# Patient Record
Sex: FEMALE | Race: BLACK OR AFRICAN AMERICAN | NOT HISPANIC OR LATINO | Employment: OTHER | URBAN - METROPOLITAN AREA
[De-identification: names, ages, dates, MRNs, and addresses within clinical notes are randomized per-mention and may not be internally consistent; named-entity substitution may affect disease eponyms.]

---

## 2017-03-05 ENCOUNTER — HOSPITAL ENCOUNTER (OUTPATIENT)
Facility: OTHER | Age: 82
Discharge: HOME | End: 2017-03-07
Attending: EMERGENCY MEDICINE | Admitting: HOSPITALIST
Payer: MEDICARE

## 2017-03-05 DIAGNOSIS — N18.4 CKD (CHRONIC KIDNEY DISEASE) STAGE 4, GFR 15-29 ML/MIN: ICD-10-CM

## 2017-03-05 DIAGNOSIS — E78.5 HYPERLIPIDEMIA, UNSPECIFIED HYPERLIPIDEMIA TYPE: ICD-10-CM

## 2017-03-05 DIAGNOSIS — I10 ESSENTIAL HYPERTENSION: ICD-10-CM

## 2017-03-05 DIAGNOSIS — R01.1 SYSTOLIC MURMUR: ICD-10-CM

## 2017-03-05 DIAGNOSIS — R55 SYNCOPE, UNSPECIFIED SYNCOPE TYPE: ICD-10-CM

## 2017-03-05 DIAGNOSIS — R01.1 HEART MURMUR: ICD-10-CM

## 2017-03-05 DIAGNOSIS — R55 SYNCOPE: Primary | ICD-10-CM

## 2017-03-05 LAB
ALBUMIN SERPL BCP-MCNC: 4.1 G/DL
ALP SERPL-CCNC: 72 U/L
ALT SERPL W/O P-5'-P-CCNC: 12 U/L
ANION GAP SERPL CALC-SCNC: 10 MMOL/L
AST SERPL-CCNC: 20 U/L
BASOPHILS # BLD AUTO: 0.02 K/UL
BASOPHILS NFR BLD: 0.2 %
BILIRUB SERPL-MCNC: 0.4 MG/DL
BNP SERPL-MCNC: 112 PG/ML
BUN SERPL-MCNC: 20 MG/DL
CALCIUM SERPL-MCNC: 9 MG/DL
CHLORIDE SERPL-SCNC: 109 MMOL/L
CO2 SERPL-SCNC: 23 MMOL/L
CREAT SERPL-MCNC: 1.5 MG/DL
DIFFERENTIAL METHOD: ABNORMAL
EOSINOPHIL # BLD AUTO: 0.1 K/UL
EOSINOPHIL NFR BLD: 0.7 %
ERYTHROCYTE [DISTWIDTH] IN BLOOD BY AUTOMATED COUNT: 14 %
EST. GFR  (AFRICAN AMERICAN): 35 ML/MIN/1.73 M^2
EST. GFR  (NON AFRICAN AMERICAN): 30 ML/MIN/1.73 M^2
GLUCOSE SERPL-MCNC: 111 MG/DL
HCT VFR BLD AUTO: 35.2 %
HGB BLD-MCNC: 11.2 G/DL
LYMPHOCYTES # BLD AUTO: 1 K/UL
LYMPHOCYTES NFR BLD: 9.3 %
MCH RBC QN AUTO: 27.9 PG
MCHC RBC AUTO-ENTMCNC: 31.8 %
MCV RBC AUTO: 88 FL
MONOCYTES # BLD AUTO: 0.8 K/UL
MONOCYTES NFR BLD: 7 %
NEUTROPHILS # BLD AUTO: 8.9 K/UL
NEUTROPHILS NFR BLD: 82.4 %
PLATELET # BLD AUTO: 226 K/UL
PMV BLD AUTO: 10.6 FL
POTASSIUM SERPL-SCNC: 3.8 MMOL/L
PROT SERPL-MCNC: 7.9 G/DL
RBC # BLD AUTO: 4.02 M/UL
SODIUM SERPL-SCNC: 142 MMOL/L
TROPONIN I SERPL DL<=0.01 NG/ML-MCNC: 0.01 NG/ML
TROPONIN I SERPL DL<=0.01 NG/ML-MCNC: 0.01 NG/ML
WBC # BLD AUTO: 10.76 K/UL

## 2017-03-05 PROCEDURE — 99285 EMERGENCY DEPT VISIT HI MDM: CPT | Mod: 25

## 2017-03-05 PROCEDURE — 80053 COMPREHEN METABOLIC PANEL: CPT

## 2017-03-05 PROCEDURE — 83880 ASSAY OF NATRIURETIC PEPTIDE: CPT

## 2017-03-05 PROCEDURE — G0378 HOSPITAL OBSERVATION PER HR: HCPCS

## 2017-03-05 PROCEDURE — 85025 COMPLETE CBC W/AUTO DIFF WBC: CPT

## 2017-03-05 PROCEDURE — 93005 ELECTROCARDIOGRAM TRACING: CPT

## 2017-03-05 PROCEDURE — 96374 THER/PROPH/DIAG INJ IV PUSH: CPT

## 2017-03-05 PROCEDURE — 84484 ASSAY OF TROPONIN QUANT: CPT | Mod: 91

## 2017-03-05 PROCEDURE — 63600175 PHARM REV CODE 636 W HCPCS: Performed by: EMERGENCY MEDICINE

## 2017-03-05 PROCEDURE — 93010 ELECTROCARDIOGRAM REPORT: CPT | Mod: ,,, | Performed by: INTERNAL MEDICINE

## 2017-03-05 PROCEDURE — 84484 ASSAY OF TROPONIN QUANT: CPT

## 2017-03-05 RX ORDER — ACETAMINOPHEN 325 MG/1
650 TABLET ORAL EVERY 8 HOURS PRN
Status: DISCONTINUED | OUTPATIENT
Start: 2017-03-05 | End: 2017-03-07 | Stop reason: HOSPADM

## 2017-03-05 RX ORDER — PANTOPRAZOLE SODIUM 40 MG/1
40 TABLET, DELAYED RELEASE ORAL DAILY
Status: DISCONTINUED | OUTPATIENT
Start: 2017-03-06 | End: 2017-03-07 | Stop reason: HOSPADM

## 2017-03-05 RX ORDER — METOPROLOL SUCCINATE 50 MG/1
50 TABLET, EXTENDED RELEASE ORAL DAILY
Status: DISCONTINUED | OUTPATIENT
Start: 2017-03-06 | End: 2017-03-07 | Stop reason: HOSPADM

## 2017-03-05 RX ORDER — ONDANSETRON 2 MG/ML
4 INJECTION INTRAMUSCULAR; INTRAVENOUS
Status: COMPLETED | OUTPATIENT
Start: 2017-03-05 | End: 2017-03-05

## 2017-03-05 RX ORDER — ONDANSETRON 8 MG/1
8 TABLET, ORALLY DISINTEGRATING ORAL EVERY 8 HOURS PRN
Status: DISCONTINUED | OUTPATIENT
Start: 2017-03-05 | End: 2017-03-07 | Stop reason: HOSPADM

## 2017-03-05 RX ORDER — ASPIRIN 81 MG/1
81 TABLET ORAL DAILY
Status: DISCONTINUED | OUTPATIENT
Start: 2017-03-06 | End: 2017-03-07 | Stop reason: HOSPADM

## 2017-03-05 RX ORDER — ROSUVASTATIN CALCIUM 5 MG/1
5 TABLET, COATED ORAL DAILY
Status: DISCONTINUED | OUTPATIENT
Start: 2017-03-06 | End: 2017-03-07 | Stop reason: HOSPADM

## 2017-03-05 RX ORDER — ZOLPIDEM TARTRATE 5 MG/1
5 TABLET ORAL NIGHTLY PRN
Status: DISCONTINUED | OUTPATIENT
Start: 2017-03-05 | End: 2017-03-07 | Stop reason: HOSPADM

## 2017-03-05 RX ADMIN — ONDANSETRON 4 MG: 2 INJECTION, SOLUTION INTRAMUSCULAR; INTRAVENOUS at 03:03

## 2017-03-05 NOTE — IP AVS SNAPSHOT
[facility] Location ([facility])  [address] [place], [place] [address]  Phone: [phone]           Patient Discharge Instructions     Our goal is to set you up for success. This packet includes information on your condition, medications, and your home care. It will help you to care for yourself so you don't get sicker and need to go back to the hospital.     Please ask your nurse if you have any questions.        There are many details to remember when preparing to leave the hospital. Here is what you will need to do:    1. Take your medicine. If you are prescribed medications, review your Medication List in the following pages. You may have new medications to  at the pharmacy and others that you'll need to stop taking. Review the instructions for how and when to take your medications. Talk with your doctor or nurses if you are unsure of what to do.     2. Go to your follow-up appointments. Specific follow-up information is listed in the following pages. Your may be contacted by a transition nurse or clinical provider about future appointments. Be sure we have all of the phone numbers to reach you, if needed. Please contact your provider's office if you are unable to make an appointment.     3. Watch for warning signs. Your doctor or nurse will give you detailed warning signs to watch for and when to call for assistance. These instructions may also include educational information about your condition. If you experience any of warning signs to your health, call your doctor.               [facility]  Unless otherwise directed by your provider, please contact [facility], our nurse care line that is available for 24/7 assistance.     [phone] (toll-free)    Registered nurses in the [facility] provide clinical advisement, health education, appointment booking, and other advisory services.                    ** Verify the list of medication(s) below is accurate and up to  date. Carry this with you in case of emergency. If your medications have changed, please notify your healthcare provider.             Medication List      CONTINUE taking these medications        Additional Info                      amlodipine-benazepril 10-20mg 10-20 mg per capsule   Commonly known as:  LOTREL   Refills:  0   Dose:  1 capsule    Instructions:  Take 1 capsule by mouth once daily.     Begin Date    AM    Noon    PM    Bedtime       aspirin 81 MG EC tablet   Commonly known as:  ECOTRIN   Refills:  0   Dose:  81 mg    Last time this was given:  81 mg on 3/7/2017  8:02 AM   Instructions:  Take 81 mg by mouth once daily.     Begin Date    AM    Noon    PM    Bedtime       multivit-mineral-iron-lutein Tab   Refills:  0    Instructions:  Take by mouth.     Begin Date    AM    Noon    PM    Bedtime       omeprazole 20 MG capsule   Commonly known as:  PRILOSEC   Refills:  0   Dose:  20 mg    Instructions:  Take 20 mg by mouth once daily.     Begin Date    AM    Noon    PM    Bedtime       rosuvastatin 5 MG tablet   Commonly known as:  CRESTOR   Refills:  0   Dose:  5 mg    Last time this was given:  5 mg on 3/7/2017  8:03 AM   Instructions:  Take 5 mg by mouth once daily.     Begin Date    AM    Noon    PM    Bedtime       TOPROL XL 50 MG 24 hr tablet   Refills:  0   Dose:  50 mg   Generic drug:  metoprolol succinate    Last time this was given:  50 mg on 3/7/2017  8:02 AM   Instructions:  Take 50 mg by mouth once daily.     Begin Date    AM    Noon    PM    Bedtime                  Please bring to all follow up appointments:    1. A copy of your discharge instructions.  2. All medicines you are currently taking in their original bottles.  3. Identification and insurance card.    Please arrive 15 minutes ahead of scheduled appointment time.    Please call 24 hours in advance if you must reschedule your appointment and/or time.        Follow-up Information     Follow up with Ari Manzano MD. Schedule an  "appointment as soon as possible for a visit in 3 days.    Specialty:  Internal Medicine    Contact information:    [address] [facility] [address]  [phone]          Follow up with Minesh Granados Jr, MD. Schedule an appointment as soon as possible for a visit in 3 days.    Specialty:  Cardiology    Contact information:    [address] [facility] [address]  [phone]          Discharge Instructions     Future Orders    Activity as tolerated     Call MD for:  difficulty breathing or increased cough     Call MD for:  increased confusion or weakness     Call MD for:  persistent dizziness, light-headedness, or visual disturbances     Call MD for:  persistent nausea and vomiting or diarrhea     Call MD for:  redness, tenderness, or signs of infection (pain, swelling, redness, odor or green/yellow discharge around incision site)     Call MD for:  severe uncontrolled pain     Call MD for:  temperature >100.4     Diet general     Questions:    Total calories:      Fat restriction, if any:      Protein restriction, if any:      Na restriction, if any:      Fluid restriction:      Additional restrictions:          Primary Diagnosis     Your primary diagnosis was:  Fainting      Admission Information     Date & Time Provider Department [facility]    3/5/2017  2:08 PM Ted Delatorre MD [facility] [phone]      Care Providers     Provider Role Specialty Primary office phone    Ted Delatorre MD Attending Provider Hospitalist [phone]    Marta Barr MD Consulting Physician  Cardiology [phone]      Your Vitals Were     BP Pulse Temp Resp Height Weight    159/70 (BP Location: Left arm, Patient Position: Lying, BP Method: Automatic) 58 97.6 °F (36.4 °C) (Oral) 18 5' 6" (1.676 m) 77.1 kg (170 lb)    Last Period SpO2 BMI          (LMP Unknown) 95% 27.44 kg/m2        Recent Lab Values     No lab values to display.      Allergies as of 3/7/2017     No Known Allergies      Advance Directives  "    An advance directive is a document which, in the event you are no longer able to make decisions for yourself, tells your healthcare team what kind of treatment you do or do not want to receive, or who you would like to make those decisions for you.  If you do not currently have an advance directive, [facility] encourages you to create one.  For more information call:  [phone]), [phone] ([phone]),  or log on to [email or web address].        Language Assistance Services     ATTENTION: Language assistance services are available, free of charge. Please call [phone].      ATENCIÓN: Si garimala krystyna, tiene a grayson disposición servicios gratuitos de asistencia lingüística. Llame al [phone].     CHÚ Ý: N?u b?n nói Ti?ng Vi?t, có các d?ch v? h? tr? ngôn ng? mi?n phí dành cho b?n. G?i s? [phone].        Chronic Kindey Disease Education             [facility] Sign-Up     Activating your [facility] account is as easy as 1-2-3!     1) Visit [email or web address], select Sign Up Now, enter this activation code and your date of birth, then select Next.  [email or web address]  Expires: 4/21/2017  2:52 PM      2) Create a username and password to use when you visit [facility] in the future and select a security question in case you lose your password and select Next.    3) Enter your e-mail address and click Sign Up!    Additional Information  If you have questions, please e-mail [email or web address] or call [phone] to talk to our [facility] staff. Remember, [facility] is NOT to be used for urgent needs. For medical emergencies, dial 911.          [facility] complies with applicable Federal civil rights laws and does not discriminate on the basis of race, color, national origin, age, disability, or sex.

## 2017-03-05 NOTE — ED NOTES
Pt ambulated to restroom w/ minimal assistance needed. Respirations are even and non labored w/ no distress noted. Family remains at bedside. Pt assisted back into bed. Pt remains on cardiac monitor, continuous pulse oximetry and automatic blood pressure cuff cycling w/ alarms set. Bed placed in low locked position, side rails up x 2, call light is within reach of patient or family, alarms set and turned on for monitor and pulse ox, will continue to monitor.

## 2017-03-05 NOTE — ED NOTES
Patient identifiers verified and correct for Ignacio Oakes.  Pt denies chest pain, dizziness, blurred vision or SOB at this time.   LOC: The patient is awake, alert and aware of environment with an appropriate affect, the patient is oriented x 3 and speaking appropriately.  APPEARANCE: Patient resting comfortably and in no acute distress, patient is clean and well groomed, patient's clothing is properly fastened.  SKIN: The skin is warm and dry, color consistent with ethnicity, patient has normal skin turgor and moist mucus membranes, skin intact, no breakdown or bruising noted.  MUSCULOSKELETAL: Patient moving all extremities spontaneously, no obvious swelling or deformities noted.  RESPIRATORY: Airway is open and patent, respirations are spontaneous, patient has a normal effort and rate, no accessory muscle use noted, bilateral breath sounds clear.  CARDIAC: Patient has a normal rate and regular rhythm, no periphreal edema noted, capillary refill < 3 seconds.  ABDOMEN: Soft and non tender to palpation, no distention noted.  NEUROLOGIC: PERRL, 2 mm bilaterally, eyes open spontaneously, behavior appropriate to situation, follows commands, facial expression symmetrical, bilateral hand grasp equal and even, purposeful motor response noted, normal sensation in all extremities when touched with a finger.    Fall risk band applied to pt.

## 2017-03-05 NOTE — ED PROVIDER NOTES
"Encounter Date: 3/5/2017    SCRIBE #1 NOTE: I, Brandy Bishop, am scribing for, and in the presence of, Dr. Norman.       History     Chief Complaint   Patient presents with    Vomiting     Pt CO N/V after eating today. Pt in NAD at this time.     Review of patient's allergies indicates:  No Known Allergies  HPI Comments:   Time seen by provider: 2:47 PM    The patient is a 89 y.o. female with HTN who presents to the ED via EMS with an sudden onset of near syncope.  Near-syncope is described as lightheadedness with associated nausea and vomiting a few minutes after she ate ribs, mashed potatoes, and sweet potatoes for lunch. The daughter reports that she was "not unconscious but was not responsive" like usual for "a minute" right before she started vomiting.  There was no fall or trauma associated.  Currently, the patient complains of right hip thigh pain which has been bothering her intermittently over the last week. Her daughter notes that her "stomach medication was not changed but the color of the pill changed" with the first dose of the new formulation taken this morning. She reports eating grits this morning and feeling in her regular state of good health. The patient denies smoking tobacco, drinking alcohol, history of DM, cancer, stroke, or cardiac complications, recent fall, injury or trauma, SOB, chest pain, palpitations, abdominal pain, back pain, urinary symptoms, or any other symptoms at this time. No SHx noted. No known drug allergies noted.     The history is provided by the patient.     Past Medical History:   Diagnosis Date    High cholesterol     Hypertension     Syncope      History reviewed. No pertinent surgical history.  History reviewed. No pertinent family history.  Social History   Substance Use Topics    Smoking status: Never Smoker    Smokeless tobacco: Never Used    Alcohol use No     Review of Systems   Constitutional: Negative for chills and fever.   HENT: Negative for congestion, " rhinorrhea, sneezing and sore throat.    Eyes: Negative for discharge and visual disturbance.   Respiratory: Negative for shortness of breath.    Cardiovascular: Negative for chest pain and palpitations.   Gastrointestinal: Positive for nausea and vomiting. Negative for abdominal pain and diarrhea.   Genitourinary: Negative for dysuria and hematuria.   Musculoskeletal: Positive for arthralgias (RLE). Negative for back pain.   Skin: Negative for rash and wound.   Neurological: Positive for syncope and light-headedness. Negative for seizures.   Psychiatric/Behavioral: The patient is not nervous/anxious.      Physical Exam   Initial Vitals   BP Pulse Resp Temp SpO2   03/05/17 1414 03/05/17 1414 03/05/17 1414 03/05/17 1414 03/05/17 1414   143/66 92 24 98.3 °F (36.8 °C) 99 %     Physical Exam    Nursing note and vitals reviewed.  Constitutional: She appears well-developed and well-nourished. She is not diaphoretic. No distress.   HENT:   Head: Normocephalic and atraumatic.   Mouth/Throat: Oropharynx is clear and moist.   Eyes: Conjunctivae and EOM are normal. Pupils are equal, round, and reactive to light.   Neck: Neck supple.   Cardiovascular: Normal rate, regular rhythm and intact distal pulses. Exam reveals no gallop and no friction rub.    Murmur (harsh deep crescendo-decrescendo murmur) heard.  Pulmonary/Chest: She has no wheezes. She has no rhonchi. She has rales (faint velcro rales at the bases).   Abdominal: Soft. She exhibits no distension. There is no tenderness. There is no rebound and no guarding.   Musculoskeletal: Normal range of motion.   Neurological: She is alert and oriented to person, place, and time. She has normal strength. No cranial nerve deficit or sensory deficit.   Skin: No rash noted. No erythema.   Psychiatric: She has a normal mood and affect. Her behavior is normal. Thought content normal.       ED Course   Procedures  Labs Reviewed   CBC W/ AUTO DIFFERENTIAL - Abnormal; Notable for the  following:        Result Value    Hemoglobin 11.2 (*)     Hematocrit 35.2 (*)     MCHC 31.8 (*)     Gran # 8.9 (*)     Gran% 82.4 (*)     Lymph% 9.3 (*)     All other components within normal limits   COMPREHENSIVE METABOLIC PANEL - Abnormal; Notable for the following:     Glucose 111 (*)     Creatinine 1.5 (*)     eGFR if  35 (*)     eGFR if non  30 (*)     All other components within normal limits   B-TYPE NATRIURETIC PEPTIDE - Abnormal; Notable for the following:      (*)     All other components within normal limits   TROPONIN I   TROPONIN I   URINALYSIS   TROPONIN I     Imaging Results         X-Ray Chest AP Portable (Final result) Result time:  03/05/17 15:38:17    Final result by Keith Pierre III, MD (03/05/17 15:38:17)    Impression:     No acute process seen.      Electronically signed by: KEITH PIERRE  Date:     03/05/17  Time:    15:38     Narrative:    One view: The heart size is normal.  There is aortic plaque.  There are prominent brachycephalic veins.  There is no change from 5/9/2015.    Lungs are clear.            EKG Readings: (Independently Interpreted)   Initial Reading: No STEMI.   Normal sinus rhythm at a rate of 83 bpm with voltage criteria for LVH.      X-Rays:   Independently Interpreted Readings:   Chest X-Ray: No infiltrates, effusion, pneumothorax, or acute process.     Medical Decision Making:   History:   Old Medical Records: I decided to obtain old medical records.  Clinical Tests:   Lab Tests: Reviewed and Ordered  Radiological Study: Reviewed and Ordered  Medical Tests: Ordered and Reviewed  ED Management:  Emergent evaluation of 89-year-old female with complaint of a syncopal episode and vomiting following lunch today.  There was no associated trauma.  Patient also complains of hip pain, which is been ongoing for a week.  On exam there is a loud heart murmur consistent with aortic stenosis.  No prior echoes on file since she usually  receives her medical care at [facility].  I'm concerned for possible dysrhythmia.  Initial labs including screening troponin are negative, other than mild elevation in BNP and renal insufficiency.  Chest x-ray showed no acute process.  She is admitted in stable condition for cardiac monitoring and further care.            Scribe Attestation:   Scribe #1: I performed the above scribed service and the documentation accurately describes the services I performed. I attest to the accuracy of the note.    Attending Attestation:           Physician Attestation for Scribe:  Physician Attestation Statement for Scribe #1: I, Dr. Norman, reviewed documentation, as scribed by Brandy Bishop in my presence, and it is both accurate and complete.                 ED Course     Clinical Impression:     1. Syncope    2. Heart murmur          Disposition:   Disposition: Admitted       Traci Norman MD  03/05/17 8589

## 2017-03-05 NOTE — ED NOTES
Patient moved to ED room 3 via EMS, patient assisted onto stretcher and changed into a gown. Patient placed on cardiac monitor, continuous pulse oximetry and automatic blood pressure cuff. Bed placed in low locked position, side rails up x 2, call light is within reach of patient or family, orientation to room and explanation of wait provided to family and patient, alarms set and turned on for monitor and pulse ox, awaiting MD evaluation and orders, will continue to monitor.

## 2017-03-05 NOTE — ED TRIAGE NOTES
"Pt presents to ER w/ reports of + nausea w/ vomiting x 2 episodes earlier today. Pt states," I was eating some ribs for lunch and just started feeling dizzy. I threw up two times but I feel better now". Pt denies chest pain, SOB, diarrhea, fever or chills.   "

## 2017-03-06 LAB
ALBUMIN SERPL BCP-MCNC: 3.4 G/DL
ALP SERPL-CCNC: 62 U/L
ALT SERPL W/O P-5'-P-CCNC: 11 U/L
ANION GAP SERPL CALC-SCNC: 7 MMOL/L
AORTIC VALVE REGURGITATION: ABNORMAL
AORTIC VALVE STENOSIS: ABNORMAL
AST SERPL-CCNC: 23 U/L
BACTERIA #/AREA URNS HPF: NORMAL /HPF
BASOPHILS # BLD AUTO: 0.02 K/UL
BASOPHILS NFR BLD: 0.3 %
BILIRUB SERPL-MCNC: 0.7 MG/DL
BILIRUB UR QL STRIP: NEGATIVE
BUN SERPL-MCNC: 19 MG/DL
CALCIUM SERPL-MCNC: 8.9 MG/DL
CHLORIDE SERPL-SCNC: 110 MMOL/L
CLARITY UR: CLEAR
CO2 SERPL-SCNC: 26 MMOL/L
COLOR UR: YELLOW
CREAT SERPL-MCNC: 1.6 MG/DL
DIASTOLIC DYSFUNCTION: YES
DIFFERENTIAL METHOD: ABNORMAL
EOSINOPHIL # BLD AUTO: 0.2 K/UL
EOSINOPHIL NFR BLD: 2.1 %
ERYTHROCYTE [DISTWIDTH] IN BLOOD BY AUTOMATED COUNT: 14 %
EST. GFR  (AFRICAN AMERICAN): 32 ML/MIN/1.73 M^2
EST. GFR  (NON AFRICAN AMERICAN): 28 ML/MIN/1.73 M^2
ESTIMATED PA SYSTOLIC PRESSURE: 12
GLOBAL PERICARDIAL EFFUSION: ABNORMAL
GLUCOSE SERPL-MCNC: 83 MG/DL
GLUCOSE UR QL STRIP: NEGATIVE
HCT VFR BLD AUTO: 32 %
HGB BLD-MCNC: 10 G/DL
HGB UR QL STRIP: ABNORMAL
KETONES UR QL STRIP: NEGATIVE
LEUKOCYTE ESTERASE UR QL STRIP: ABNORMAL
LYMPHOCYTES # BLD AUTO: 1.4 K/UL
LYMPHOCYTES NFR BLD: 19.2 %
MCH RBC QN AUTO: 27.2 PG
MCHC RBC AUTO-ENTMCNC: 31.3 %
MCV RBC AUTO: 87 FL
MICROSCOPIC COMMENT: NORMAL
MONOCYTES # BLD AUTO: 0.7 K/UL
MONOCYTES NFR BLD: 9.8 %
NEUTROPHILS # BLD AUTO: 4.9 K/UL
NEUTROPHILS NFR BLD: 68.2 %
NITRITE UR QL STRIP: NEGATIVE
PH UR STRIP: 6 [PH] (ref 5–8)
PLATELET # BLD AUTO: 226 K/UL
PMV BLD AUTO: 10.3 FL
POTASSIUM SERPL-SCNC: 4.5 MMOL/L
PROT SERPL-MCNC: 6.8 G/DL
PROT UR QL STRIP: NEGATIVE
RBC # BLD AUTO: 3.67 M/UL
RBC #/AREA URNS HPF: 4 /HPF (ref 0–4)
RETIRED EF AND QEF - SEE NOTES: 60 (ref 55–65)
SODIUM SERPL-SCNC: 143 MMOL/L
SP GR UR STRIP: 1.01 (ref 1–1.03)
SQUAMOUS #/AREA URNS HPF: 3 /HPF
TRICUSPID VALVE REGURGITATION: ABNORMAL
URN SPEC COLLECT METH UR: ABNORMAL
UROBILINOGEN UR STRIP-ACNC: NEGATIVE EU/DL
WBC # BLD AUTO: 7.24 K/UL
WBC #/AREA URNS HPF: 2 /HPF (ref 0–5)

## 2017-03-06 PROCEDURE — 25000003 PHARM REV CODE 250

## 2017-03-06 PROCEDURE — 85025 COMPLETE CBC W/AUTO DIFF WBC: CPT

## 2017-03-06 PROCEDURE — 81000 URINALYSIS NONAUTO W/SCOPE: CPT

## 2017-03-06 PROCEDURE — 36415 COLL VENOUS BLD VENIPUNCTURE: CPT

## 2017-03-06 PROCEDURE — 80053 COMPREHEN METABOLIC PANEL: CPT

## 2017-03-06 PROCEDURE — G0378 HOSPITAL OBSERVATION PER HR: HCPCS

## 2017-03-06 PROCEDURE — 93306 TTE W/DOPPLER COMPLETE: CPT

## 2017-03-06 PROCEDURE — 99220 PR INITIAL OBSERVATION CARE,LEVL III: CPT | Mod: ,,, | Performed by: PHYSICIAN ASSISTANT

## 2017-03-06 RX ADMIN — METOPROLOL SUCCINATE 50 MG: 50 TABLET, EXTENDED RELEASE ORAL at 08:03

## 2017-03-06 RX ADMIN — ROSUVASTATIN CALCIUM 5 MG: 5 TABLET, FILM COATED ORAL at 08:03

## 2017-03-06 RX ADMIN — PANTOPRAZOLE SODIUM 40 MG: 40 TABLET, DELAYED RELEASE ORAL at 08:03

## 2017-03-06 RX ADMIN — ASPIRIN 81 MG: 81 TABLET, COATED ORAL at 08:03

## 2017-03-06 NOTE — ASSESSMENT & PLAN NOTE
- possibly related to newly diagnosed heart murmur  - Normal neuro exam  - ECHO today   - will assess orthostatics  - Consulting cardiology

## 2017-03-06 NOTE — SUBJECTIVE & OBJECTIVE
Past Medical History:   Diagnosis Date    High cholesterol     Hypertension     Syncope        History reviewed. No pertinent surgical history.    Review of patient's allergies indicates:  No Known Allergies    No current facility-administered medications on file prior to encounter.      Current Outpatient Prescriptions on File Prior to Encounter   Medication Sig    amlodipine-benazepril 10-20mg (LOTREL) 10-20 mg per capsule Take 1 capsule by mouth once daily.    aspirin (ECOTRIN) 81 MG EC tablet Take 81 mg by mouth once daily.    metoprolol succinate (TOPROL XL) 50 MG 24 hr tablet Take 50 mg by mouth once daily.    multivit-mineral-iron-lutein Tab Take by mouth.    omeprazole (PRILOSEC) 20 MG capsule Take 20 mg by mouth once daily.    rosuvastatin (CRESTOR) 5 MG tablet Take 5 mg by mouth once daily.     Family History     None        Social History Main Topics    Smoking status: Never Smoker    Smokeless tobacco: Never Used    Alcohol use No    Drug use: No    Sexual activity: Not on file     Review of Systems   Constitutional: Negative for activity change, appetite change, chills, diaphoresis and fever.   Eyes: Negative for visual disturbance.   Respiratory: Negative for cough, shortness of breath and wheezing.    Cardiovascular: Negative for chest pain, palpitations and leg swelling.   Gastrointestinal: Positive for nausea and vomiting. Negative for abdominal pain, constipation and diarrhea.   Genitourinary: Negative for dysuria, flank pain, frequency, hematuria and urgency.   Musculoskeletal: Negative for gait problem, neck pain and neck stiffness.   Skin: Negative for color change and pallor.   Neurological: Positive for dizziness, syncope and light-headedness. Negative for tremors, seizures, facial asymmetry, weakness and headaches.     Objective:     Vital Signs (Most Recent):  Temp: 98.5 °F (36.9 °C) (03/06/17 1100)  Pulse: 79 (03/06/17 1400)  Resp: 18 (03/06/17 1100)  BP: 125/62 (03/06/17  1100)  SpO2: 97 % (03/06/17 1100) Vital Signs (24h Range):  Temp:  [97.9 °F (36.6 °C)-99.1 °F (37.3 °C)] 98.5 °F (36.9 °C)  Pulse:  [] 79  Resp:  [12-45] 18  SpO2:  [84 %-98 %] 97 %  BP: (125-180)/(60-71) 125/62     Weight: 77.1 kg (170 lb)  Body mass index is 27.44 kg/(m^2).    Physical Exam   Constitutional: She is oriented to person, place, and time. She appears well-developed and well-nourished. No distress.   HENT:   Head: Normocephalic and atraumatic.   Right Ear: External ear normal.   Left Ear: External ear normal.   Eyes: Conjunctivae and EOM are normal. Pupils are equal, round, and reactive to light. No scleral icterus.   Neck: Normal range of motion. Neck supple. No tracheal deviation present.   Cardiovascular: Normal rate, regular rhythm, normal heart sounds and intact distal pulses.  Exam reveals no gallop and no friction rub.    No murmur heard.  Pulmonary/Chest: Effort normal and breath sounds normal. No stridor. No respiratory distress. She has no wheezes. She has no rales. She exhibits no tenderness.   Bilateral lung fields CTA.     Abdominal: Soft. Bowel sounds are normal. She exhibits no distension and no mass. There is no tenderness. There is no guarding.   Musculoskeletal: Normal range of motion. She exhibits no deformity.   Neurological: She is alert and oriented to person, place, and time. No cranial nerve deficit. She exhibits normal muscle tone.   The patient was alert, relaxed and cooperative with coherent thought process. AAOx4. CN II-XII were intact. The patient had good muscle bulk and tone with 5/5 strength throughout. Good finger-to-nose task ability. Sensation was intact to light touch.     Skin: Skin is warm and dry. No rash noted. She is not diaphoretic. No erythema. No pallor.   Psychiatric: She has a normal mood and affect. Her behavior is normal. Judgment and thought content normal.   Nursing note and vitals reviewed.       Significant Labs:   BMP:   Recent Labs  Lab  03/06/17  0504   GLU 83      K 4.5      CO2 26   BUN 19   CREATININE 1.6*   CALCIUM 8.9     CBC:   Recent Labs  Lab 03/05/17  1540 03/06/17  0504   WBC 10.76 7.24   HGB 11.2* 10.0*   HCT 35.2* 32.0*    226     CMP:   Recent Labs  Lab 03/05/17  1540 03/06/17  0504    143   K 3.8 4.5    110   CO2 23 26   * 83   BUN 20 19   CREATININE 1.5* 1.6*   CALCIUM 9.0 8.9   PROT 7.9 6.8   ALBUMIN 4.1 3.4*   BILITOT 0.4 0.7   ALKPHOS 72 62   AST 20 23   ALT 12 11   ANIONGAP 10 7*   EGFRNONAA 30* 28*     Troponin:   Recent Labs  Lab 03/05/17  1540 03/05/17  1701   TROPONINI 0.007 0.012     All pertinent labs within the past 24 hours have been reviewed.    Significant Imaging: I have reviewed all pertinent imaging results/findings within the past 24 hours.     Imaging Results         X-Ray Chest AP Portable (Final result) Result time:  03/05/17 15:38:17    Final result by Keith Pierre III, MD (03/05/17 15:38:17)    Impression:     No acute process seen.      Electronically signed by: KEITH PIERRE  Date:     03/05/17  Time:    15:38     Narrative:    One view: The heart size is normal.  There is aortic plaque.  There are prominent brachycephalic veins.  There is no change from 5/9/2015.    Lungs are clear.

## 2017-03-06 NOTE — CLINICAL REVIEW
Ambulated to bathroom with standby assistance. Missed pan in toilet for urine specimen. Will attempt next void.

## 2017-03-06 NOTE — ASSESSMENT & PLAN NOTE
- Appears well controlled   - Continue home meds:    Toprol XL 50 mg QD    Benazapril 20 mg QD     Amlodipine 10 mg QD

## 2017-03-06 NOTE — PLAN OF CARE
03/06/17 1022   CRAMER Message   Medicare Outpatient and Observation Notification regarding financial responsibility Explained to patient/caregiver;Signed/date by patient/caregiver   Date CRAMER was signed 03/06/17   Time CRAMER was signed [id number]

## 2017-03-06 NOTE — PLAN OF CARE
Problem: Patient Care Overview  Goal: Plan of Care Review  Outcome: Ongoing (interventions implemented as appropriate)  Slept well. Family member at bedside. Vs stable.No c/o syncopy

## 2017-03-06 NOTE — H&P
"[facility] Medicine  History & Physical    Patient Name: Ignacio Oakes  MRN: [id number]  Admission Date: 3/5/2017  Attending Physician: Santosh Moore MD   Primary Care Provider: Ari Manzano MD         Patient information was obtained from patient, relative(s), past medical records and ER records.     Subjective:     Principal Problem:Syncope    Chief Complaint:   Chief Complaint   Patient presents with    Vomiting     Pt CO N/V after eating today. Pt in NAD at this time.        HPI: Ms. Ignacio Oakes is a 89 y.o. Female, with PMH of HTN and HLP, who presents s/p near syncopal episode yesterday. Her daughter, present at the time of the episode states she had just eaten a plate of ribs, prior to the episode. The patient states she began to feel dizzy as if she were spinning, when she walked to her recliner and sat down. Shortly after sitting, her daughter endorses she looked as if she fell asleep and "passed out." She then rubber her mother's chest to awaken her twice, each time her mother awoke and was confused as to why her daughter was rubbing her chest. She endorses associated symptoms of a nausea, light headedness, and a single episode of non-bloody emesis shortly after awakening. She denies fever, chills, sweats, chest pain, SOB, abdominal pain, diarrhea, headache, vision changes, weakness, difficulty walking. This is similar to a previous episode for which she was admitted in the past. She endorses a recent color change to her omeprazole tablet, without any further medication changes or additions.     Past Medical History:   Diagnosis Date    High cholesterol     Hypertension     Syncope        History reviewed. No pertinent surgical history.    Review of patient's allergies indicates:  No Known Allergies    No current facility-administered medications on file prior to encounter.      Current Outpatient Prescriptions on File Prior to Encounter   Medication Sig    " amlodipine-benazepril 10-20mg (LOTREL) 10-20 mg per capsule Take 1 capsule by mouth once daily.    aspirin (ECOTRIN) 81 MG EC tablet Take 81 mg by mouth once daily.    metoprolol succinate (TOPROL XL) 50 MG 24 hr tablet Take 50 mg by mouth once daily.    multivit-mineral-iron-lutein Tab Take by mouth.    omeprazole (PRILOSEC) 20 MG capsule Take 20 mg by mouth once daily.    rosuvastatin (CRESTOR) 5 MG tablet Take 5 mg by mouth once daily.     Family History     None        Social History Main Topics    Smoking status: Never Smoker    Smokeless tobacco: Never Used    Alcohol use No    Drug use: No    Sexual activity: Not on file     Review of Systems   Constitutional: Negative for activity change, appetite change, chills, diaphoresis and fever.   Eyes: Negative for visual disturbance.   Respiratory: Negative for cough, shortness of breath and wheezing.    Cardiovascular: Negative for chest pain, palpitations and leg swelling.   Gastrointestinal: Positive for nausea and vomiting. Negative for abdominal pain, constipation and diarrhea.   Genitourinary: Negative for dysuria, flank pain, frequency, hematuria and urgency.   Musculoskeletal: Negative for gait problem, neck pain and neck stiffness.   Skin: Negative for color change and pallor.   Neurological: Positive for dizziness, syncope and light-headedness. Negative for tremors, seizures, facial asymmetry, weakness and headaches.     Objective:     Vital Signs (Most Recent):  Temp: 98.5 °F (36.9 °C) (03/06/17 1100)  Pulse: 79 (03/06/17 1400)  Resp: 18 (03/06/17 1100)  BP: 125/62 (03/06/17 1100)  SpO2: 97 % (03/06/17 1100) Vital Signs (24h Range):  Temp:  [97.9 °F (36.6 °C)-99.1 °F (37.3 °C)] 98.5 °F (36.9 °C)  Pulse:  [] 79  Resp:  [12-45] 18  SpO2:  [84 %-98 %] 97 %  BP: (125-180)/(60-71) 125/62     Weight: 77.1 kg (170 lb)  Body mass index is 27.44 kg/(m^2).    Physical Exam   Constitutional: She is oriented to person, place, and time. She appears  well-developed and well-nourished. No distress.   HENT:   Head: Normocephalic and atraumatic.   Right Ear: External ear normal.   Left Ear: External ear normal.   Eyes: Conjunctivae and EOM are normal. Pupils are equal, round, and reactive to light. No scleral icterus.   Neck: Normal range of motion. Neck supple. No tracheal deviation present.   Cardiovascular: Normal rate, regular rhythm, normal heart sounds and intact distal pulses.  Exam reveals no gallop and no friction rub.    No murmur heard.  Pulmonary/Chest: Effort normal and breath sounds normal. No stridor. No respiratory distress. She has no wheezes. She has no rales. She exhibits no tenderness.   Bilateral lung fields CTA.     Abdominal: Soft. Bowel sounds are normal. She exhibits no distension and no mass. There is no tenderness. There is no guarding.   Musculoskeletal: Normal range of motion. She exhibits no deformity.   Neurological: She is alert and oriented to person, place, and time. No cranial nerve deficit. She exhibits normal muscle tone.   The patient was alert, relaxed and cooperative with coherent thought process. AAOx4. CN II-XII were intact. The patient had good muscle bulk and tone with 5/5 strength throughout. Good finger-to-nose task ability. Sensation was intact to light touch.     Skin: Skin is warm and dry. No rash noted. She is not diaphoretic. No erythema. No pallor.   Psychiatric: She has a normal mood and affect. Her behavior is normal. Judgment and thought content normal.   Nursing note and vitals reviewed.       Significant Labs:   BMP:   Recent Labs  Lab 03/06/17  0504   GLU 83      K 4.5      CO2 26   BUN 19   CREATININE 1.6*   CALCIUM 8.9     CBC:   Recent Labs  Lab 03/05/17  1540 03/06/17  0504   WBC 10.76 7.24   HGB 11.2* 10.0*   HCT 35.2* 32.0*    226     CMP:   Recent Labs  Lab 03/05/17  1540 03/06/17  0504    143   K 3.8 4.5    110   CO2 23 26   * 83   BUN 20 19   CREATININE 1.5*  1.6*   CALCIUM 9.0 8.9   PROT 7.9 6.8   ALBUMIN 4.1 3.4*   BILITOT 0.4 0.7   ALKPHOS 72 62   AST 20 23   ALT 12 11   ANIONGAP 10 7*   EGFRNONAA 30* 28*     Troponin:   Recent Labs  Lab 03/05/17  1540 03/05/17  1701   TROPONINI 0.007 0.012     All pertinent labs within the past 24 hours have been reviewed.    Significant Imaging: I have reviewed all pertinent imaging results/findings within the past 24 hours.     Imaging Results         X-Ray Chest AP Portable (Final result) Result time:  03/05/17 15:38:17    Final result by Keith Pierre III, MD (03/05/17 15:38:17)    Impression:     No acute process seen.      Electronically signed by: KEITH PIERRE  Date:     03/05/17  Time:    15:38     Narrative:    One view: The heart size is normal.  There is aortic plaque.  There are prominent brachycephalic veins.  There is no change from 5/9/2015.    Lungs are clear.               Assessment/Plan:     * Syncope  - possibly related to newly diagnosed heart murmur  - Normal neuro exam  - ECHO today   - will assess orthostatics  - Consulting cardiology       Hypertension  - Appears well controlled   - Continue home meds:    Toprol XL 50 mg QD    Benazapril 20 mg QD     Amlodipine 10 mg QD       Hyperlipidemia  - Continue home meds: Crestor 5 mg QD   - Lipid panel for AM       CKD (chronic kidney disease) stage 4, GFR 15-29 ml/min  - Avoid nephrotoxic medications  - renally dose all medications       Systolic murmur  - Consulting Cardiology   - ECHO today       VTE Risk Mitigation         Ordered     Medium Risk of VTE  Once      03/05/17 1604     Place sequential compression device  Until discontinued      03/05/17 1604        Juanita Parker PA-C  Department of Hospital Medicine   [facility]

## 2017-03-06 NOTE — PLAN OF CARE
"SW met with pt at bedside to complete discharge assessment, thad Rae [phone], present.  Pt disoriented to date and place.  Pt lives with daughter, Marylu.  Pt reported takes meds as prescribe most of the time, "sometimes I just forget".  No needs identified at this time, will reassess later.     03/06/17 1015   Discharge Assessment   Assessment Type Discharge Planning Assessment   Confirmed/corrected address and phone number on facesheet? Yes   Assessment information obtained from? Patient;Caregiver  (Kyra, thad)   Communicated expected length of stay with patient/caregiver no   Prior to hospitilization cognitive status: Alert/Oriented   Prior to hospitalization functional status: Independent   Current cognitive status: Not Oriented to Place;Not Oriented to Time   Current Functional Status: Independent   Arrived From home or self-care   Lives With child(tory), adult   Able to Return to Prior Arrangements yes   Is patient able to care for self after discharge? Yes   How many people do you have in your home that can help with your care after discharge? 1   Who are your caregiver(s) and their phone number(s)? Marylu, thad, [phone]   Patient's perception of discharge disposition home or selfcare   Readmission Within The Last 30 Days no previous admission in last 30 days   Patient currently being followed by outpatient case management? No   Patient currently receives home health services? No   Does the patient currently use HME? No   Patient currently receives private duty nursing? No   Patient currently receives any other outside agency services? No   Do you have any problems affording any of your prescribed medications? No   Is the patient taking medications as prescribed? yes   Do you have any financial concerns preventing you from receiving the healthcare you need? No   Does the patient have transportation to healthcare appointments? Yes   Transportation Available family or friend will provide "   On Dialysis? No   Does the patient receive services at the Coumadin Clinic? No   Are there any open cases? No   Discharge Plan A Home   Patient/Family In Agreement With Plan yes

## 2017-03-07 VITALS
SYSTOLIC BLOOD PRESSURE: 130 MMHG | BODY MASS INDEX: 27.32 KG/M2 | TEMPERATURE: 99 F | DIASTOLIC BLOOD PRESSURE: 61 MMHG | HEIGHT: 66 IN | RESPIRATION RATE: 18 BRPM | HEART RATE: 69 BPM | WEIGHT: 170 LBS | OXYGEN SATURATION: 99 %

## 2017-03-07 PROBLEM — I35.0 AORTIC STENOSIS: Status: ACTIVE | Noted: 2017-03-07

## 2017-03-07 LAB
ALBUMIN SERPL BCP-MCNC: 3.4 G/DL
ALP SERPL-CCNC: 61 U/L
ALT SERPL W/O P-5'-P-CCNC: 10 U/L
ANION GAP SERPL CALC-SCNC: 7 MMOL/L
AST SERPL-CCNC: 19 U/L
BASOPHILS # BLD AUTO: 0.01 K/UL
BASOPHILS NFR BLD: 0.1 %
BILIRUB SERPL-MCNC: 0.6 MG/DL
BUN SERPL-MCNC: 22 MG/DL
CALCIUM SERPL-MCNC: 8.9 MG/DL
CHLORIDE SERPL-SCNC: 108 MMOL/L
CHOLEST/HDLC SERPL: 3 {RATIO}
CO2 SERPL-SCNC: 26 MMOL/L
CREAT SERPL-MCNC: 1.7 MG/DL
DIFFERENTIAL METHOD: ABNORMAL
EOSINOPHIL # BLD AUTO: 0.2 K/UL
EOSINOPHIL NFR BLD: 3.1 %
ERYTHROCYTE [DISTWIDTH] IN BLOOD BY AUTOMATED COUNT: 14 %
EST. GFR  (AFRICAN AMERICAN): 30 ML/MIN/1.73 M^2
EST. GFR  (NON AFRICAN AMERICAN): 26 ML/MIN/1.73 M^2
GLUCOSE SERPL-MCNC: 85 MG/DL
HCT VFR BLD AUTO: 33 %
HDL/CHOLESTEROL RATIO: 33 %
HDLC SERPL-MCNC: 100 MG/DL
HDLC SERPL-MCNC: 33 MG/DL
HGB BLD-MCNC: 10.4 G/DL
LDLC SERPL CALC-MCNC: 57 MG/DL
LYMPHOCYTES # BLD AUTO: 1.6 K/UL
LYMPHOCYTES NFR BLD: 22.1 %
MCH RBC QN AUTO: 27.4 PG
MCHC RBC AUTO-ENTMCNC: 31.5 %
MCV RBC AUTO: 87 FL
MONOCYTES # BLD AUTO: 0.5 K/UL
MONOCYTES NFR BLD: 7.7 %
NEUTROPHILS # BLD AUTO: 4.7 K/UL
NEUTROPHILS NFR BLD: 66.9 %
NONHDLC SERPL-MCNC: 67 MG/DL
PLATELET # BLD AUTO: 236 K/UL
PMV BLD AUTO: 10.3 FL
POTASSIUM SERPL-SCNC: 4.1 MMOL/L
PROT SERPL-MCNC: 6.9 G/DL
RBC # BLD AUTO: 3.79 M/UL
SODIUM SERPL-SCNC: 141 MMOL/L
TRIGL SERPL-MCNC: 50 MG/DL
WBC # BLD AUTO: 7.01 K/UL

## 2017-03-07 PROCEDURE — 80061 LIPID PANEL: CPT

## 2017-03-07 PROCEDURE — G8978 MOBILITY CURRENT STATUS: HCPCS | Mod: CJ

## 2017-03-07 PROCEDURE — 25000003 PHARM REV CODE 250

## 2017-03-07 PROCEDURE — G8980 MOBILITY D/C STATUS: HCPCS | Mod: CJ

## 2017-03-07 PROCEDURE — 85025 COMPLETE CBC W/AUTO DIFF WBC: CPT

## 2017-03-07 PROCEDURE — 36415 COLL VENOUS BLD VENIPUNCTURE: CPT

## 2017-03-07 PROCEDURE — G8979 MOBILITY GOAL STATUS: HCPCS | Mod: CJ

## 2017-03-07 PROCEDURE — 80053 COMPREHEN METABOLIC PANEL: CPT

## 2017-03-07 PROCEDURE — 97161 PT EVAL LOW COMPLEX 20 MIN: CPT

## 2017-03-07 PROCEDURE — 99225 PR SUBSEQUENT OBSERVATION CARE,LEVEL II: CPT | Mod: ,,, | Performed by: PHYSICIAN ASSISTANT

## 2017-03-07 PROCEDURE — G0378 HOSPITAL OBSERVATION PER HR: HCPCS

## 2017-03-07 RX ADMIN — ASPIRIN 81 MG: 81 TABLET, COATED ORAL at 08:03

## 2017-03-07 RX ADMIN — METOPROLOL SUCCINATE 50 MG: 50 TABLET, EXTENDED RELEASE ORAL at 08:03

## 2017-03-07 RX ADMIN — ROSUVASTATIN CALCIUM 5 MG: 5 TABLET, FILM COATED ORAL at 08:03

## 2017-03-07 RX ADMIN — PANTOPRAZOLE SODIUM 40 MG: 40 TABLET, DELAYED RELEASE ORAL at 08:03

## 2017-03-07 NOTE — ASSESSMENT & PLAN NOTE
- possibly related to newly diagnosed heart murmur  - Normal neuro exam  - ECHO showed aortic stenosis with EF of 60%  - Not orthostatic  - Consulting suggests neurocardiogenic cause exacerbated by AS

## 2017-03-07 NOTE — PLAN OF CARE
Problem: Patient Care Overview  Goal: Plan of Care Review  Outcome: Ongoing (interventions implemented as appropriate)  Pt eager & in agreement w/ DC. VU of DC instructions and the need to attend follow-up appointment--paperwork passed & explained. IV removed w/ cath tip intact, WNL. Voiding, ambulating, & tolerating PO well. To be DCd home w/ family--will be escorted downstairs via  transport team once dressed, ready & ride arrives. Free from falls, injury, or skin breakdown this hospital admission. Pt discharged in no distress.

## 2017-03-07 NOTE — ASSESSMENT & PLAN NOTE
- Continue home meds: Crestor 5 mg QD   - Lipid panel shows:   Results for OLI MILLER (MRN [id number]) as of 3/7/2017 13:35   Ref. Range 3/7/2017 05:28   Cholesterol Latest Ref Range: 120 - 199 mg/dL 100 (L)   HDL Latest Ref Range: 40 - 75 mg/dL 33 (L)   LDL Cholesterol Latest Ref Range: 63.0 - 159.0 mg/dL 57.0 (L)   Total Cholesterol/HDL Ratio Latest Ref Range: 2.0 - 5.0  3.0   Triglycerides Latest Ref Range: 30 - 150 mg/dL 50     -She will be discharged with instructions to continue her Crestor as prescribed.

## 2017-03-07 NOTE — PT/OT/SLP PROGRESS
"Physical Therapy  Evaluation and Discharge Summary    Ignacio Oakes   MRN: [id number]   Admitting Diagnosis: Syncope    PT Received On: 17  PT Start Time: 1139     PT Stop Time: 1156    PT Total Time (min): 17 min       Billable Minutes:  Evaluation 17      Past Medical History:   Diagnosis Date    High cholesterol     Hypertension     Syncope       History reviewed. No pertinent surgical history.    Referring physician: Momo  Date referred to PT: 3/6/17    General Precautions: Standard,  (fall risk; ambulate with assist)  Orthopedic Precautions: N/A   Braces: N/A       Do you have any cultural, spiritual, Gnosticist conflicts, given your current situation?: none specified    Patient History:  Living Environment Comment: Per patient and daughter: Pt lives with 2 daughters in 1 story house with 4 steps to enter with bilateral hand rails. She has access to both a tub/shower and a walk-in shower. She performs mobility and ADLs independently, and prefers not to use her rollator. She and her daughter share responsibilities for cooking and cleaning. Her daughters assist with transportation since the patient does not drive.   Equipment Currently Used at Home: none  DME owned (not currently used): rollator      Previous Level of Function:  Ambulation Skills: independent  Transfer Skills: independent  ADL Skills: independent  Work/Leisure Activity: needs assist    Subjective:  Communicated with nurse prior to session.  Pt denied symptoms of lightheadedness and dizziness throughout session. Pt and daughter eager for discharge home and denied further PT needs and DME needs. Pt stated re: her diagnosis, "they say it's old age."    Pain Ratin/10     Pain Rating Post-Intervention: 0/10    Objective:    Pt found supine in bed with HOB elevated.     Cognitive Exam:  Oriented to: Partial to person, partial to place, partial to time    Follows Commands/attention: Follows two-step commands  Communication: " clear/fluent  Safety awareness/insight to disability: intact    Physical Exam:  Postural examination/scapula alignment: Rounded shoulder and Kyphosis    Skin integrity: Visible skin intact  Edema: None noted     Sensation:   Denied paresthesias      Lower Extremity Range of Motion:  Right Lower Extremity: WFL  Left Lower Extremity: WFL    Lower Extremity Strength:  Right Lower Extremity: WFL  Left Lower Extremity: WFL     No coordination or tone impairments identified.    Functional Mobility:  Bed Mobility:   Supine to Sit: Independent  Sit to Supine: Independent    Transfers:  Sit <> Stand Assistance: Supervision  Sit <> Stand Assistive Device: No Assistive Device    Gait:   Gait Distance: x 120 ft on level tile, wide MARAH, medium to high guard  Assistance 1: Contact Guard Assistance (progressing to SBA)  Gait Assistive Device: No device  Gait Pattern: reciprocal  Gait Deviation(s): decreased step length      Balance:   Static Sit: GOOD: Takes MODERATE challenges from all directions  Dynamic Sit: GOOD: Maintains balance through MODERATE excursions of active trunk movement  Static Stand: FAIR+: Takes MINIMAL challenges from all directions  Dynamic stand: FAIR: Needs CONTACT GUARD during gait       AM-PAC 6 CLICK MOBILITY  How much help from another person does this patient currently need?   1 = Unable, Total/Dependent Assistance  2 = A lot, Maximum/Moderate Assistance  3 = A little, Minimum/Contact Guard/Supervision  4 = None, Modified [place]/Independent    Turning over in bed (including adjusting bedclothes, sheets and blankets)?: 4  Sitting down on and standing up from a chair with arms (e.g., wheelchair, bedside commode, etc.): 3  Moving from lying on back to sitting on the side of the bed?: 4  Moving to and from a bed to a chair (including a wheelchair)?: 3  Need to walk in hospital room?: 3  Climbing 3-5 steps with a railing?: 3  Total Score: 20    AM-PAC Raw Score CMS G-Code Modifier Level of Impairment  Assistance   6 % Total / Unable   7 - 9 CM 80 - 100% Maximal Assist   10 - 14 CL 60 - 80% Moderate Assist   15 - 19 CK 40 - 60% Moderate Assist   20 - 22 CJ 20 - 40% Minimal Assist   23 CI 1-20% SBA / CGA   24 CH 0% Independent/ Mod I     Patient left supine with all lines intact, call button in reach, nurse notified and daughter present.    Assessment:  Ignacio Oakes is a 89 y.o. female with a medical diagnosis of Syncope Pt denied symptoms of lightheadedness and dizziness and was slightly hypertensive at beginning session (SBP 150s). PT evaluation completed. Per patient and daughter, the patient is at her functional baseline. She has good activity tolerance including ambulation in halls > 100 ft with CGA progressing to SBA. She has a rollator at home that she does not use. Family is available to assist her 24/7. No further acute PT indicated. Please reconsult if patient has a change in status.     Rehab identified problem list/impairments: Rehab identified problem list/impairments: impaired balance    Rehab potential is fair.    Activity tolerance: Good    Discharge recommendations: Discharge Facility/Level Of Care Needs: home (with continued family 24/7 supervision)     Barriers to discharge:      Equipment recommendations: Equipment Needed After Discharge: none     GOALS:   Physical Therapy Goals     Not on file      Multidisciplinary Problems (Resolved)        Problem: Physical Therapy Goal    Goal Priority Disciplines Outcome Goal Variances Interventions   Physical Therapy Goal   (Resolved)     PT/OT, PT Outcome(s) achieved               PLAN:    Patient to be seen  (No further acute PT indicated, pt is at her functional baseline)  to address the above listed problems via    Plan of Care expires: 04/06/17  Plan of Care reviewed with: patient, daughter         Gerir Salvadordarleen, PT  03/07/2017

## 2017-03-07 NOTE — PROGRESS NOTES
[facility]  Cardiology  Progress Note    Patient Name: Ignacio Oakes  MRN: [id number]  Admission Date: 3/5/2017  Hospital Length of Stay: 0 days  Code Status: Full Code   Attending Physician: Ted Delatorre MD   Primary Care Physician: Ari Manzano MD  Expected Discharge Date:   Principal Problem:Syncope    Subjective:     Brief HPI:    89 yo female with hypertension and moderate to severe hypertension. Lived with her two daughters. Following lunch on 3/5/2017 she got an upset stomach. She had nausea and vomited. Then became weak and had a chnage in mental status. Unclear if she fully lost consciousness.    Hospital Course:     No further weak spells.    Interval History:     No CP, SOB, palpitations or weak spells.    Review of Systems   Constitution: Negative for fever.   Eyes: Negative for blurred vision and double vision.   Cardiovascular: Positive for near-syncope. Negative for chest pain, dyspnea on exertion, irregular heartbeat, leg swelling, orthopnea, palpitations, paroxysmal nocturnal dyspnea and syncope.   Respiratory: Negative for cough, hemoptysis and shortness of breath.    Endocrine: Negative for cold intolerance and heat intolerance.   Gastrointestinal: Negative for hematemesis, hematochezia, hemorrhoids, jaundice, melena, nausea and vomiting.   Genitourinary: Negative for dysuria.     Objective:     Vital Signs (Most Recent):  Temp: 98.3 °F (36.8 °C) (03/07/17 0758)  Pulse: 66 (03/07/17 0800)  Resp: 18 (03/07/17 0758)  BP: (!) 172/72 (03/07/17 0758)  SpO2: 100 % (03/07/17 0758) Vital Signs (24h Range):  Temp:  [98.3 °F (36.8 °C)-98.9 °F (37.2 °C)] 98.3 °F (36.8 °C)  Pulse:  [60-79] 66  Resp:  [18] 18  SpO2:  [95 %-100 %] 100 %  BP: (116-172)/(61-73) 172/72     Weight: 77.1 kg (170 lb)  Body mass index is 27.44 kg/(m^2).    SpO2: 100 %  O2 Device (Oxygen Therapy): room air      Intake/Output Summary (Last 24 hours) at 03/07/17 1023  Last data filed at 03/07/17 0600   Gross per  24 hour   Intake             1300 ml   Output                0 ml   Net             1300 ml       Lines/Drains/Airways     Peripheral Intravenous Line                 Peripheral IV - Single Lumen Left Antecubital -- days         Peripheral IV - Single Lumen 03/02/16 1102 Left Hand 369 days         Peripheral IV - Single Lumen 03/05/17 1407 Left Antecubital 1 day                Physical Exam   Constitutional: She appears well-developed and well-nourished. She does not appear ill. No distress.   HENT:   Head: Normocephalic and atraumatic.   Nose: Nose normal.   Eyes: No foreign body present in the right eye. No foreign body present in the left eye. Right conjunctiva is not injected. Left conjunctiva is not injected. Right pupil is reactive. Left pupil is reactive. Pupils are equal.   Neck: No JVD present. Carotid bruit is not present.   Cardiovascular: Normal rate, regular rhythm, S1 normal and S2 normal.    Murmur heard.   Harsh midsystolic murmur is present  at the upper right sternal border  Pulses:       Dorsalis pedis pulses are 1+ on the right side, and 1+ on the left side.        Posterior tibial pulses are 1+ on the right side, and 1+ on the left side.   Pulmonary/Chest: Effort normal and breath sounds normal.   Abdominal: Soft. Normal appearance. There is no tenderness.   Musculoskeletal:        Right ankle: She exhibits no swelling.        Left ankle: She exhibits no swelling.   Skin: Skin is warm and dry. No rash noted.   Psychiatric: She has a normal mood and affect.       Current Medications:     aspirin  81 mg Oral Daily    metoprolol succinate  50 mg Oral Daily    pantoprazole  40 mg Oral Daily    rosuvastatin  5 mg Oral Daily     Current Laboratory Results:    Recent Results (from the past 24 hour(s))   2D echo with color flow doppler    Collection Time: 03/06/17  3:32 PM   Result Value Ref Range    EF 60 55 - 65    Diastolic Dysfunction Yes (A)     Aortic Valve Regurgitation TRIVIAL     Aortic  Valve Stenosis MODERATE TO SEVERE (A)     Est. PA Systolic Pressure 12     Pericardial Effusion NONE     Tricuspid Valve Regurgitation MILD    CBC auto differential    Collection Time: 03/07/17  5:28 AM   Result Value Ref Range    WBC 7.01 3.90 - 12.70 K/uL    RBC 3.79 (L) 4.00 - 5.40 M/uL    Hemoglobin 10.4 (L) 12.0 - 16.0 g/dL    Hematocrit 33.0 (L) 37.0 - 48.5 %    MCV 87 82 - 98 fL    MCH 27.4 27.0 - 31.0 pg    MCHC 31.5 (L) 32.0 - 36.0 %    RDW 14.0 11.5 - 14.5 %    Platelets 236 150 - 350 K/uL    MPV 10.3 9.2 - 12.9 fL    Gran # 4.7 1.8 - 7.7 K/uL    Lymph # 1.6 1.0 - 4.8 K/uL    Mono # 0.5 0.3 - 1.0 K/uL    Eos # 0.2 0.0 - 0.5 K/uL    Baso # 0.01 0.00 - 0.20 K/uL    Gran% 66.9 38.0 - 73.0 %    Lymph% 22.1 18.0 - 48.0 %    Mono% 7.7 4.0 - 15.0 %    Eosinophil% 3.1 0.0 - 8.0 %    Basophil% 0.1 0.0 - 1.9 %    Differential Method Automated    Comprehensive metabolic panel    Collection Time: 03/07/17  5:28 AM   Result Value Ref Range    Sodium 141 136 - 145 mmol/L    Potassium 4.1 3.5 - 5.1 mmol/L    Chloride 108 95 - 110 mmol/L    CO2 26 23 - 29 mmol/L    Glucose 85 70 - 110 mg/dL    BUN, Bld 22 10 - 30 mg/dL    Creatinine 1.7 (H) 0.5 - 1.4 mg/dL    Calcium 8.9 8.7 - 10.5 mg/dL    Total Protein 6.9 6.0 - 8.4 g/dL    Albumin 3.4 (L) 3.5 - 5.2 g/dL    Total Bilirubin 0.6 0.1 - 1.0 mg/dL    Alkaline Phosphatase 61 55 - 135 U/L    AST 19 10 - 40 U/L    ALT 10 10 - 44 U/L    Anion Gap 7 (L) 8 - 16 mmol/L    eGFR if African American 30 (A) >60 mL/min/1.73 m^2    eGFR if non African American 26 (A) >60 mL/min/1.73 m^2   Lipid panel    Collection Time: 03/07/17  5:28 AM   Result Value Ref Range    Cholesterol 100 (L) 120 - 199 mg/dL    Triglycerides 50 30 - 150 mg/dL    HDL 33 (L) 40 - 75 mg/dL    LDL Cholesterol 57.0 (L) 63.0 - 159.0 mg/dL    HDL/Chol Ratio 33.0 20.0 - 50.0 %    Total Cholesterol/HDL Ratio 3.0 2.0 - 5.0    Non-HDL Cholesterol 67 mg/dL     Current Imaging Results:    Imaging Results         X-Ray Chest  AP Portable (Final result) Result time:  03/05/17 15:38:17    Final result by Keith Pierre III, MD (03/05/17 15:38:17)    Impression:     No acute process seen.      Electronically signed by: KEITH PIERRE  Date:     03/05/17  Time:    15:38     Narrative:    One view: The heart size is normal.  There is aortic plaque.  There are prominent brachycephalic veins.  There is no change from 5/9/2015.    Lungs are clear.              Echo: 3/06/2017:    TEST DESCRIPTION   Technical Quality: This is a technically adequate study.     Aorta: The aortic root is normal in size, measuring 2.3 cm at sinotubular junction and 2.7 cm at Sinuses of Valsalva. The proximal ascending aorta is normal in size, measuring 2.3 cm across.     Left Atrium: The left atrial volume index is normal, measuring 25.46 cc/m2.     Left Ventricle: The left ventricle is normal in size, with an end-diastolic diameter of 3.4 cm, and an end-systolic diameter of 2.0 cm. Wall thickness is mildly increased, with the septum and the posterior wall each measuring 1.2 cm across. Relative wall   thickness was increased at 0.71, and the LV mass index was 79.4 g/m2 consistent with concentric remodeling. Global left ventricular systolic function appears normal. Visually estimated ejection fraction is 60-65%. The LV Doppler derived stroke volume   equals 60.0 ccs.   The E/e'(lat) is 14, consistent with significant diastolic dysfunction.     Right Atrium: The right atrium is normal in size, measuring 3.8 cm in length and 2.1 cm in width in the apical view.     Right Ventricle: The right ventricle is normal in size measuring 2.4 cm at the base in the apical right ventricle-focused view. Global right ventricular systolic function appears normal. The estimated PA systolic pressure is 12 mmHg.     Aortic Valve:  The aortic valve is markedly sclerotic with markedly restricted leaflet mobility. The aortic valve is tri-leaflet in structure. The peak velocity obtained  across the aortic valve is 3.01 m/s, which translates to a peak gradient of 36.0   mmHg. The mean gradient is 20.0 mmHg. Using a left ventricular outflow tract diameter of 1.8 cm, a left ventricular outflow tract velocity time integral of 23 cm, and a peak instantaneous transvalvular velocity time integral of 63 cm, the calculated   aortic valve area is 0.94 cm2, consistent with moderate to severe aortic stenosis. Additionally, there is trivial aortic regurgitation.     Mitral Valve:  The mitral valve is mildly sclerotic. The pressure half time is 83 msec. The calculated mitral valve area is 2.65 cm2.     Tricuspid Valve:  The tricuspid valve is normal in structure. There is mild tricuspid regurgitation.     Pulmonary Valve:  The pulmonic valve is normal in structure.     Pericardium: There is no evidence of pericardial effusion.      IVC: IVC is normal in size and collapses > 50% with a sniff, suggesting normal right atrial pressure of 3 mmHg.     Intracavitary: There is no evidence of intracavitary mass or thrombus. There is no evidence of vegetation.     CONCLUSIONS     1 - Concentric remodeling.     2 - Normal left ventricular systolic function (EF 60-65%).     3 - Left ventricular diastolic dysfunction.     4 - Moderate to severe aortic stenosis, WILTON = 0.94 cm2, peak velocity = 3.01 m/s, mean gradient = 20.0 mmHg.     5 - Trivial aortic regurgitation.       This document has been electronically    SIGNED BY: Marta Barr MD On: 03/06/2017 21:24    Assessment and Plan:     Brief HPI:    Active Diagnoses:    Diagnosis Date Noted POA    PRINCIPAL PROBLEM:  Syncope [R55] 03/05/2017 Yes    Systolic murmur [R01.1] 05/10/2015 Yes    Hypertension [I10] 05/09/2015 Yes    Hyperlipidemia [E78.5] 05/09/2015 Yes    CKD (chronic kidney disease) stage 4, GFR 15-29 ml/min [N18.4] 05/09/2015 Yes      Problems Resolved During this Admission:    Diagnosis Date Noted Date Resolved POA     Assessment and Plan:    1.  "Syncope              Suspect neurocardiogenic event.   Maybe worsened by AS.     2. Aortic Stenosis              3/6/2017: Echo: Moderate to severe AS - 3.0 m/s - 0.9 cm2.              Appears "asymptomatic"              Medical therapy.        VTE Risk Mitigation         Ordered     Medium Risk of VTE  Once      03/05/17 1604     Place sequential compression device  Until discontinued      03/05/17 1604          Anticipated Disposition: Home or Self Care    Discharge Needs: None unusual.    Marta Barr MD  Cardiology  [facility]  "

## 2017-03-07 NOTE — PLAN OF CARE
Problem: Patient Care Overview  Goal: Plan of Care Review  Outcome: Ongoing (interventions implemented as appropriate)  No significant events over night. Remains free from fall or injury. VSS. No c/o dizziness or lightheadedness. Ambulates with standby assistance to the bathroom. Plan of care reviewed with patient and family. All questions answered. No further requests at this time.     Jaja Sarabia RN

## 2017-03-07 NOTE — CONSULTS
[facility]  Cardiology  Consult Note    Patient Name: Ignacio Oakes  MRN: [id number]  Admission Date: 3/5/2017  Hospital Length of Stay: 0 days  Code Status: Full Code   Attending Provider: Santosh Moore MD   Consulting Provider: Marta Barr MD  Primary Care Physician: Ari Manzano MD  Principal Problem:Syncope    Patient information was obtained from patient, relative(s), past medical records and ER records.     Consults  Subjective:     Chief Complaint:  Syncope.     HPI:   89 yo female with hypertension and moderate to severe hypertension. Lived with her two daughters. Following lunch on 3/5/2017 she got an upset stomach. She had nausea and vomited. Then became weak and had a chnage in mental status. Unclear if she fully lost consciousness.    Past Medical History:   Diagnosis Date    High cholesterol     Hypertension     Syncope        History reviewed. No pertinent surgical history.    Review of patient's allergies indicates:  No Known Allergies    No current facility-administered medications on file prior to encounter.      Current Outpatient Prescriptions on File Prior to Encounter   Medication Sig    amlodipine-benazepril 10-20mg (LOTREL) 10-20 mg per capsule Take 1 capsule by mouth once daily.    aspirin (ECOTRIN) 81 MG EC tablet Take 81 mg by mouth once daily.    metoprolol succinate (TOPROL XL) 50 MG 24 hr tablet Take 50 mg by mouth once daily.    multivit-mineral-iron-lutein Tab Take by mouth.    omeprazole (PRILOSEC) 20 MG capsule Take 20 mg by mouth once daily.    rosuvastatin (CRESTOR) 5 MG tablet Take 5 mg by mouth once daily.     Family History     None        Social History Main Topics    Smoking status: Never Smoker    Smokeless tobacco: Never Used    Alcohol use No    Drug use: No    Sexual activity: Not on file     Review of Systems   Cardiovascular: Positive for near-syncope and syncope. Negative for chest pain, claudication, dyspnea on exertion,  irregular heartbeat, leg swelling, orthopnea, palpitations and paroxysmal nocturnal dyspnea.   Respiratory: Negative for cough, hemoptysis, shortness of breath, sleep disturbances due to breathing and wheezing.    Endocrine: Negative for cold intolerance.   Hematologic/Lymphatic: Negative for bleeding problem.   Gastrointestinal: Positive for heartburn, nausea and vomiting. Negative for hematemesis, hematochezia, hemorrhoids, jaundice and melena.   Genitourinary: Negative for hematuria.   Psychiatric/Behavioral: Negative for altered mental status, depression and memory loss. The patient is not nervous/anxious.    Allergic/Immunologic: Negative for persistent infections.     Objective:     Vital Signs (Most Recent):  Temp: 98.8 °F (37.1 °C) (03/06/17 1530)  Pulse: 66 (03/06/17 1800)  Resp: 18 (03/06/17 1530)  BP: 116/61 (03/06/17 1535)  SpO2: 95 % (03/06/17 1530) Vital Signs (24h Range):  Temp:  [97.9 °F (36.6 °C)-98.8 °F (37.1 °C)] 98.8 °F (37.1 °C)  Pulse:  [] 66  Resp:  [18] 18  SpO2:  [95 %-97 %] 95 %  BP: (116-137)/(60-66) 116/61     Weight: 77.1 kg (170 lb)  Body mass index is 27.44 kg/(m^2).    SpO2: 95 %  O2 Device (Oxygen Therapy): room air      Intake/Output Summary (Last 24 hours) at 03/06/17 1955  Last data filed at 03/06/17 1800   Gross per 24 hour   Intake             1620 ml   Output                0 ml   Net             1620 ml       Lines/Drains/Airways     Peripheral Intravenous Line                 Peripheral IV - Single Lumen Left Antecubital -- days         Peripheral IV - Single Lumen 03/02/16 1102 Left Hand 369 days         Peripheral IV - Single Lumen 03/05/17 1407 Left Antecubital 1 day                Physical Exam   Constitutional: She is oriented to person, place, and time. She appears well-developed and well-nourished. She does not appear ill. No distress.   HENT:   Head: Normocephalic and atraumatic.   Nose: Nose normal.   Eyes: Right eye exhibits no discharge. Left eye exhibits no  discharge. Right conjunctiva is not injected. Left conjunctiva is not injected. Right pupil is round. Left pupil is round. Pupils are equal.   Neck: Neck supple. No JVD present. Carotid bruit is present. No thyromegaly present.   Cardiovascular: Normal rate, regular rhythm, S1 normal and S2 normal.   No extrasystoles are present. PMI is not displaced.  Exam reveals gallop and S4.    Murmur heard.   Harsh crescendo-decrescendo midsystolic murmur is present with a grade of 3/6  at the upper right sternal border radiating to the neck  Pulses:       Radial pulses are 2+ on the right side, and 2+ on the left side.        Femoral pulses are 2+ on the right side, and 2+ on the left side.       Dorsalis pedis pulses are 1+ on the right side, and 1+ on the left side.        Posterior tibial pulses are 1+ on the right side, and 1+ on the left side.   Pulmonary/Chest: Effort normal and breath sounds normal.   Abdominal: Soft. Normal appearance. There is no hepatosplenomegaly. There is no tenderness.   Musculoskeletal:        Right ankle: She exhibits no swelling, no ecchymosis and no deformity.        Left ankle: She exhibits no swelling, no ecchymosis and no deformity.   Lymphadenopathy:        Head (right side): No submandibular adenopathy present.        Head (left side): No submandibular adenopathy present.     She has no cervical adenopathy.   Neurological: She is alert and oriented to person, place, and time. She is not disoriented. No cranial nerve deficit.   Skin: Skin is warm, dry and intact. No rash noted. She is not diaphoretic. No cyanosis. No pallor. Nails show no clubbing.   Psychiatric: She has a normal mood and affect. Her speech is normal and behavior is normal. Judgment and thought content normal. Cognition and memory are normal.       Current Medications:     aspirin  81 mg Oral Daily    metoprolol succinate  50 mg Oral Daily    pantoprazole  40 mg Oral Daily    rosuvastatin  5 mg Oral Daily     Current  Laboratory Results:    Recent Results (from the past 24 hour(s))   Urinalysis - Clean Catch    Collection Time: 03/06/17  4:55 AM   Result Value Ref Range    Specimen UA Urine, Clean Catch     Color, UA Yellow Yellow, Straw, Herlinda    Appearance, UA Clear Clear    pH, UA 6.0 5.0 - 8.0    Specific Gravity, UA 1.010 1.005 - 1.030    Protein, UA Negative Negative    Glucose, UA Negative Negative    Ketones, UA Negative Negative    Bilirubin (UA) Negative Negative    Occult Blood UA Trace (A) Negative    Nitrite, UA Negative Negative    Urobilinogen, UA Negative <2.0 EU/dL    Leukocytes, UA Trace (A) Negative   Urinalysis Microscopic    Collection Time: 03/06/17  4:55 AM   Result Value Ref Range    RBC, UA 4 0 - 4 /hpf    WBC, UA 2 0 - 5 /hpf    Bacteria, UA Occasional None-Occ /hpf    Squam Epithel, UA 3 /hpf    Microscopic Comment SEE COMMENT    CBC auto differential    Collection Time: 03/06/17  5:04 AM   Result Value Ref Range    WBC 7.24 3.90 - 12.70 K/uL    RBC 3.67 (L) 4.00 - 5.40 M/uL    Hemoglobin 10.0 (L) 12.0 - 16.0 g/dL    Hematocrit 32.0 (L) 37.0 - 48.5 %    MCV 87 82 - 98 fL    MCH 27.2 27.0 - 31.0 pg    MCHC 31.3 (L) 32.0 - 36.0 %    RDW 14.0 11.5 - 14.5 %    Platelets 226 150 - 350 K/uL    MPV 10.3 9.2 - 12.9 fL    Gran # 4.9 1.8 - 7.7 K/uL    Lymph # 1.4 1.0 - 4.8 K/uL    Mono # 0.7 0.3 - 1.0 K/uL    Eos # 0.2 0.0 - 0.5 K/uL    Baso # 0.02 0.00 - 0.20 K/uL    Gran% 68.2 38.0 - 73.0 %    Lymph% 19.2 18.0 - 48.0 %    Mono% 9.8 4.0 - 15.0 %    Eosinophil% 2.1 0.0 - 8.0 %    Basophil% 0.3 0.0 - 1.9 %    Differential Method Automated    Comprehensive metabolic panel    Collection Time: 03/06/17  5:04 AM   Result Value Ref Range    Sodium 143 136 - 145 mmol/L    Potassium 4.5 3.5 - 5.1 mmol/L    Chloride 110 95 - 110 mmol/L    CO2 26 23 - 29 mmol/L    Glucose 83 70 - 110 mg/dL    BUN, Bld 19 10 - 30 mg/dL    Creatinine 1.6 (H) 0.5 - 1.4 mg/dL    Calcium 8.9 8.7 - 10.5 mg/dL    Total Protein 6.8 6.0 - 8.4 g/dL  "   Albumin 3.4 (L) 3.5 - 5.2 g/dL    Total Bilirubin 0.7 0.1 - 1.0 mg/dL    Alkaline Phosphatase 62 55 - 135 U/L    AST 23 10 - 40 U/L    ALT 11 10 - 44 U/L    Anion Gap 7 (L) 8 - 16 mmol/L    eGFR if African American 32 (A) >60 mL/min/1.73 m^2    eGFR if non African American 28 (A) >60 mL/min/1.73 m^2     Current Imaging Results:    Imaging Results         X-Ray Chest AP Portable (Final result) Result time:  03/05/17 15:38:17    Final result by Keith Pierre III, MD (03/05/17 15:38:17)    Impression:     No acute process seen.      Electronically signed by: KEITH PIERRE  Date:     03/05/17  Time:    15:38     Narrative:    One view: The heart size is normal.  There is aortic plaque.  There are prominent brachycephalic veins.  There is no change from 5/9/2015.    Lungs are clear.              3/6/2017: Echo: Normal left ventricular size and systolic function. Moderate to severe AS - 3.0 m/s - 0.9 cm2.    ECG: NSR. LVH,    Assessment and Plan:     1. Syncope   Suspect neurocardiogenic event maybe worsened by AS.    2. Aortic Stenosis   3/6/2017: Echo: Moderate to severe AS - 3.0 m/s - 0.9 cm2.   Appears "asymptomatic"   Medical therapy.    VTE Risk Mitigation         Ordered     Medium Risk of VTE  Once      03/05/17 1604     Place sequential compression device  Until discontinued      03/05/17 1604          Thank you for your consult.    I will follow-up with patient. Please contact us if you have any additional questions.    Marta Barr MD  Cardiology   [facility]    "

## 2017-03-07 NOTE — PLAN OF CARE
Problem: Physical Therapy Goal  Goal: Physical Therapy Goal  Outcome: Outcome(s) achieved Date Met:  03/07/17  PT evaluation completed. Per patient and daughter, the patient is at her functional baseline. She has good activity tolerance including ambulation in halls > 100 ft with CGA progressing to SBA. She has a rollator at home that she does not use. Family is available to assist her 24/7. No further acute PT indicated.

## 2017-03-07 NOTE — ASSESSMENT & PLAN NOTE
- Appears fairly stable with previous readings  - Avoid nephrotoxic medications  - renally dose all medications

## 2017-03-07 NOTE — SUBJECTIVE & OBJECTIVE
Past Medical History:   Diagnosis Date    High cholesterol     Hypertension     Syncope        History reviewed. No pertinent surgical history.    Review of patient's allergies indicates:  No Known Allergies    No current facility-administered medications on file prior to encounter.      Current Outpatient Prescriptions on File Prior to Encounter   Medication Sig    amlodipine-benazepril 10-20mg (LOTREL) 10-20 mg per capsule Take 1 capsule by mouth once daily.    aspirin (ECOTRIN) 81 MG EC tablet Take 81 mg by mouth once daily.    metoprolol succinate (TOPROL XL) 50 MG 24 hr tablet Take 50 mg by mouth once daily.    multivit-mineral-iron-lutein Tab Take by mouth.    omeprazole (PRILOSEC) 20 MG capsule Take 20 mg by mouth once daily.    rosuvastatin (CRESTOR) 5 MG tablet Take 5 mg by mouth once daily.     Family History     None        Social History Main Topics    Smoking status: Never Smoker    Smokeless tobacco: Never Used    Alcohol use No    Drug use: No    Sexual activity: Not on file     Review of Systems   Cardiovascular: Positive for near-syncope and syncope. Negative for chest pain, claudication, dyspnea on exertion, irregular heartbeat, leg swelling, orthopnea, palpitations and paroxysmal nocturnal dyspnea.   Respiratory: Negative for cough, hemoptysis, shortness of breath, sleep disturbances due to breathing and wheezing.    Endocrine: Negative for cold intolerance.   Hematologic/Lymphatic: Negative for bleeding problem.   Gastrointestinal: Positive for heartburn, nausea and vomiting. Negative for hematemesis, hematochezia, hemorrhoids, jaundice and melena.   Genitourinary: Negative for hematuria.   Psychiatric/Behavioral: Negative for altered mental status, depression and memory loss. The patient is not nervous/anxious.    Allergic/Immunologic: Negative for persistent infections.     Objective:     Vital Signs (Most Recent):  Temp: 98.8 °F (37.1 °C) (03/06/17 1530)  Pulse: 66 (03/06/17  1800)  Resp: 18 (03/06/17 1530)  BP: 116/61 (03/06/17 1535)  SpO2: 95 % (03/06/17 1530) Vital Signs (24h Range):  Temp:  [97.9 °F (36.6 °C)-98.8 °F (37.1 °C)] 98.8 °F (37.1 °C)  Pulse:  [] 66  Resp:  [18] 18  SpO2:  [95 %-97 %] 95 %  BP: (116-137)/(60-66) 116/61     Weight: 77.1 kg (170 lb)  Body mass index is 27.44 kg/(m^2).    SpO2: 95 %  O2 Device (Oxygen Therapy): room air      Intake/Output Summary (Last 24 hours) at 03/06/17 1955  Last data filed at 03/06/17 1800   Gross per 24 hour   Intake             1620 ml   Output                0 ml   Net             1620 ml       Lines/Drains/Airways     Peripheral Intravenous Line                 Peripheral IV - Single Lumen Left Antecubital -- days         Peripheral IV - Single Lumen 03/02/16 1102 Left Hand 369 days         Peripheral IV - Single Lumen 03/05/17 1407 Left Antecubital 1 day                Physical Exam   Constitutional: She is oriented to person, place, and time. She appears well-developed and well-nourished. She does not appear ill. No distress.   HENT:   Head: Normocephalic and atraumatic.   Nose: Nose normal.   Eyes: Right eye exhibits no discharge. Left eye exhibits no discharge. Right conjunctiva is not injected. Left conjunctiva is not injected. Right pupil is round. Left pupil is round. Pupils are equal.   Neck: Neck supple. No JVD present. Carotid bruit is present. No thyromegaly present.   Cardiovascular: Normal rate, regular rhythm, S1 normal and S2 normal.   No extrasystoles are present. PMI is not displaced.  Exam reveals gallop and S4.    Murmur heard.   Harsh crescendo-decrescendo midsystolic murmur is present with a grade of 3/6  at the upper right sternal border radiating to the neck  Pulses:       Radial pulses are 2+ on the right side, and 2+ on the left side.        Femoral pulses are 2+ on the right side, and 2+ on the left side.       Dorsalis pedis pulses are 1+ on the right side, and 1+ on the left side.        Posterior  tibial pulses are 1+ on the right side, and 1+ on the left side.   Pulmonary/Chest: Effort normal and breath sounds normal.   Abdominal: Soft. Normal appearance. There is no hepatosplenomegaly. There is no tenderness.   Musculoskeletal:        Right ankle: She exhibits no swelling, no ecchymosis and no deformity.        Left ankle: She exhibits no swelling, no ecchymosis and no deformity.   Lymphadenopathy:        Head (right side): No submandibular adenopathy present.        Head (left side): No submandibular adenopathy present.     She has no cervical adenopathy.   Neurological: She is alert and oriented to person, place, and time. She is not disoriented. No cranial nerve deficit.   Skin: Skin is warm, dry and intact. No rash noted. She is not diaphoretic. No cyanosis. No pallor. Nails show no clubbing.   Psychiatric: She has a normal mood and affect. Her speech is normal and behavior is normal. Judgment and thought content normal. Cognition and memory are normal.       Current Medications:     aspirin  81 mg Oral Daily    metoprolol succinate  50 mg Oral Daily    pantoprazole  40 mg Oral Daily    rosuvastatin  5 mg Oral Daily     Current Laboratory Results:    Recent Results (from the past 24 hour(s))   Urinalysis - Clean Catch    Collection Time: 03/06/17  4:55 AM   Result Value Ref Range    Specimen UA Urine, Clean Catch     Color, UA Yellow Yellow, Straw, Herlinda    Appearance, UA Clear Clear    pH, UA 6.0 5.0 - 8.0    Specific Gravity, UA 1.010 1.005 - 1.030    Protein, UA Negative Negative    Glucose, UA Negative Negative    Ketones, UA Negative Negative    Bilirubin (UA) Negative Negative    Occult Blood UA Trace (A) Negative    Nitrite, UA Negative Negative    Urobilinogen, UA Negative <2.0 EU/dL    Leukocytes, UA Trace (A) Negative   Urinalysis Microscopic    Collection Time: 03/06/17  4:55 AM   Result Value Ref Range    RBC, UA 4 0 - 4 /hpf    WBC, UA 2 0 - 5 /hpf    Bacteria, UA Occasional None-Occ  /hpf    Squam Epithel, UA 3 /hpf    Microscopic Comment SEE COMMENT    CBC auto differential    Collection Time: 03/06/17  5:04 AM   Result Value Ref Range    WBC 7.24 3.90 - 12.70 K/uL    RBC 3.67 (L) 4.00 - 5.40 M/uL    Hemoglobin 10.0 (L) 12.0 - 16.0 g/dL    Hematocrit 32.0 (L) 37.0 - 48.5 %    MCV 87 82 - 98 fL    MCH 27.2 27.0 - 31.0 pg    MCHC 31.3 (L) 32.0 - 36.0 %    RDW 14.0 11.5 - 14.5 %    Platelets 226 150 - 350 K/uL    MPV 10.3 9.2 - 12.9 fL    Gran # 4.9 1.8 - 7.7 K/uL    Lymph # 1.4 1.0 - 4.8 K/uL    Mono # 0.7 0.3 - 1.0 K/uL    Eos # 0.2 0.0 - 0.5 K/uL    Baso # 0.02 0.00 - 0.20 K/uL    Gran% 68.2 38.0 - 73.0 %    Lymph% 19.2 18.0 - 48.0 %    Mono% 9.8 4.0 - 15.0 %    Eosinophil% 2.1 0.0 - 8.0 %    Basophil% 0.3 0.0 - 1.9 %    Differential Method Automated    Comprehensive metabolic panel    Collection Time: 03/06/17  5:04 AM   Result Value Ref Range    Sodium 143 136 - 145 mmol/L    Potassium 4.5 3.5 - 5.1 mmol/L    Chloride 110 95 - 110 mmol/L    CO2 26 23 - 29 mmol/L    Glucose 83 70 - 110 mg/dL    BUN, Bld 19 10 - 30 mg/dL    Creatinine 1.6 (H) 0.5 - 1.4 mg/dL    Calcium 8.9 8.7 - 10.5 mg/dL    Total Protein 6.8 6.0 - 8.4 g/dL    Albumin 3.4 (L) 3.5 - 5.2 g/dL    Total Bilirubin 0.7 0.1 - 1.0 mg/dL    Alkaline Phosphatase 62 55 - 135 U/L    AST 23 10 - 40 U/L    ALT 11 10 - 44 U/L    Anion Gap 7 (L) 8 - 16 mmol/L    eGFR if African American 32 (A) >60 mL/min/1.73 m^2    eGFR if non African American 28 (A) >60 mL/min/1.73 m^2     Current Imaging Results:    Imaging Results         X-Ray Chest AP Portable (Final result) Result time:  03/05/17 15:38:17    Final result by Keith Pierre III, MD (03/05/17 15:38:17)    Impression:     No acute process seen.      Electronically signed by: KEITH PIERRE  Date:     03/05/17  Time:    15:38     Narrative:    One view: The heart size is normal.  There is aortic plaque.  There are prominent brachycephalic veins.  There is no change from 5/9/2015.    Lungs  are clear.              3/6/2017: Echo: Normal left ventricular size and systolic function. Moderate to severe AS - 3.0 m/s - 0.9 cm2.    ECG: NSR. LVH,

## 2017-03-07 NOTE — DISCHARGE SUMMARY
"[facility] Hospital Medicine  Discharge Summary      Patient Name: Ignacio Oakes  MRN: [id number]  Admission Date: 3/5/2017  Hospital Length of Stay: 0 days  Discharge Date and Time:  03/07/2017 1:40 PM  Attending Physician: Ted Delatorre MD   Discharging Provider: Juanita Parker PA-C  Primary Care Provider: Ari Manzano MD      HPI:   Ms. Ignacio Oakes is a 89 y.o. Female, with PMH of HTN and HLP, who presents s/p near syncopal episode yesterday. Her daughter, present at the time of the episode states she had just eaten a plate of ribs, prior to the episode. The patient states she began to feel dizzy as if she were spinning, when she walked to her recliner and sat down. Shortly after sitting, her daughter endorses she looked as if she fell asleep and "passed out." She then rubber her mother's chest to awaken her twice, each time her mother awoke and was confused as to why her daughter was rubbing her chest. She endorses associated symptoms of a nausea, light headedness, and a single episode of non-bloody emesis shortly after awakening. She denies fever, chills, sweats, chest pain, SOB, abdominal pain, diarrhea, headache, vision changes, weakness, difficulty walking. This is similar to a previous episode for which she was admitted in the past. She endorses a recent color change to her omeprazole tablet, without any further medication changes or additions.     * No surgery found *      Indwelling Lines/Drains at time of discharge:   Lines/Drains/Airways          No matching active lines, drains, or airways        Hospital Course:   The patient was seen an examined in the ED with CMP that showed Cr of 1/6, without any further abnormalities. CBC was normal. CXR without change from a previous CXR from 5/2015. An EKG showed NSR without STEMI at rate of 83, with MVH criteria met. UA showed trace of leukocytes, without associated ROS to indicate UTI. She has also had 2 negative troponin " tests. She was admitted for further evaluation and treatment of a newly diagnosed heart murmur and for cardiac monitoring and consultation.   The patient had an ECHO and was examined by cardiology. ECHO showed EF of 60%, with diastolic dysfunction, trivial aortic valve regurgitation, moderate to severe aortic valve stenosis, and mild tricuspid regurgitation. Medical therapy was recommended and the patient was cleared for discharge to follow up with her cardiologist.     Cardiologist, Dr. Minesh Granados.        Consults:   Consults         Status Ordering Provider     Inpatient consult to Cardiology  Once     Provider:  Marta Barr MD    Acknowledged KYLEE ENAMORADO          Significant Diagnostic Studies: Labs:   BMP:   Recent Labs  Lab 03/05/17  1540 03/06/17  0504 03/07/17  0528   * 83 85    143 141   K 3.8 4.5 4.1    110 108   CO2 23 26 26   BUN 20 19 22   CREATININE 1.5* 1.6* 1.7*   CALCIUM 9.0 8.9 8.9   , CMP   Recent Labs  Lab 03/05/17  1540 03/06/17  0504 03/07/17  0528    143 141   K 3.8 4.5 4.1    110 108   CO2 23 26 26   * 83 85   BUN 20 19 22   CREATININE 1.5* 1.6* 1.7*   CALCIUM 9.0 8.9 8.9   PROT 7.9 6.8 6.9   ALBUMIN 4.1 3.4* 3.4*   BILITOT 0.4 0.7 0.6   ALKPHOS 72 62 61   AST 20 23 19   ALT 12 11 10   ANIONGAP 10 7* 7*   ESTGFRAFRICA 35* 32* 30*   EGFRNONAA 30* 28* 26*   , CBC   Recent Labs  Lab 03/05/17  1540 03/06/17  0504 03/07/17  0528   WBC 10.76 7.24 7.01   HGB 11.2* 10.0* 10.4*   HCT 35.2* 32.0* 33.0*    226 236    and All labs within the past 24 hours have been reviewed    Pending Diagnostic Studies:     None        Final Active Diagnoses:    Diagnosis Date Noted POA    PRINCIPAL PROBLEM:  Syncope [R55] 03/05/2017 Yes    Aortic stenosis [I35.0] 03/07/2017 Yes    Hypertension [I10] 05/09/2015 Yes    Hyperlipidemia [E78.5] 05/09/2015 Yes    CKD (chronic kidney disease) stage 4, GFR 15-29 ml/min [N18.4] 05/09/2015 Yes      Problems  Resolved During this Admission:    Diagnosis Date Noted Date Resolved POA      * Syncope  - possibly related to newly diagnosed heart murmur  - Normal neuro exam  - ECHO showed aortic stenosis with EF of 60%  - Not orthostatic  - Consulting suggests neurocardiogenic cause exacerbated by AS      Hypertension  - Appears well controlled   - Continue home meds:    Toprol XL 50 mg QD    Benazapril 20 mg QD     Amlodipine 10 mg QD       Hyperlipidemia  - Continue home meds: Crestor 5 mg QD   - Lipid panel shows:   Results for OLI MILLER (MRN [id number]) as of 3/7/2017 13:35   Ref. Range 3/7/2017 05:28   Cholesterol Latest Ref Range: 120 - 199 mg/dL 100 (L)   HDL Latest Ref Range: 40 - 75 mg/dL 33 (L)   LDL Cholesterol Latest Ref Range: 63.0 - 159.0 mg/dL 57.0 (L)   Total Cholesterol/HDL Ratio Latest Ref Range: 2.0 - 5.0  3.0   Triglycerides Latest Ref Range: 30 - 150 mg/dL 50     -She will be discharged with instructions to continue her Crestor as prescribed.       CKD (chronic kidney disease) stage 4, GFR 15-29 ml/min  - Appears fairly stable with previous readings  - Avoid nephrotoxic medications  - renally dose all medications       Aortic stenosis  - moderate to severe on ECHO   - EF 60%   - Follow up with your cardiologist           Discharged Condition: stable    Disposition: Home or Self Care    Follow Up:  Follow-up Information     Follow up with Ari Manzano MD. Schedule an appointment as soon as possible for a visit in 3 days.    Specialty:  Internal Medicine    Contact information:    [address] [facility] [address] [phone]          Follow up with Minesh Granados Jr, MD. Schedule an appointment as soon as possible for a visit in 3 days.    Specialty:  Cardiology    Contact information:    [address] [facility] [address] [phone]          Patient Instructions:     Diet general     Activity as tolerated     Call MD for:  temperature >100.4     Call MD for:  persistent nausea  and vomiting or diarrhea     Call MD for:  severe uncontrolled pain     Call MD for:  redness, tenderness, or signs of infection (pain, swelling, redness, odor or green/yellow discharge around incision site)     Call MD for:  difficulty breathing or increased cough     Call MD for:  persistent dizziness, light-headedness, or visual disturbances     Call MD for:  increased confusion or weakness       Medications:  Reconciled Home Medications:   Current Discharge Medication List      CONTINUE these medications which have NOT CHANGED    Details   amlodipine-benazepril 10-20mg (LOTREL) 10-20 mg per capsule Take 1 capsule by mouth once daily.      aspirin (ECOTRIN) 81 MG EC tablet Take 81 mg by mouth once daily.      metoprolol succinate (TOPROL XL) 50 MG 24 hr tablet Take 50 mg by mouth once daily.      multivit-mineral-iron-lutein Tab Take by mouth.      omeprazole (PRILOSEC) 20 MG capsule Take 20 mg by mouth once daily.      rosuvastatin (CRESTOR) 5 MG tablet Take 5 mg by mouth once daily.           Time spent on the discharge of patient: >30 minutes    Juanita Parker PA-C  Department of Hospital Medicine  [facility]

## 2017-03-08 PROBLEM — R01.1 HEART MURMUR: Status: ACTIVE | Noted: 2017-03-08

## 2017-03-08 NOTE — PLAN OF CARE
03/08/17 1405   Final Note   Assessment Type Final Discharge Note   Discharge Disposition Home   Discharge planning education complete? Yes   What phone number can be called within the next 1-3 days to see how you are doing after discharge? ([phone])   Hospital Follow Up  Appt(s) scheduled? No   Offered [facility] -- Bedside Delivery? n/a   Referral to Outpatient Case Management complete? n/a   Referral to / orders for Home Health Complete? n/a   30 day supply of medicines given at discharge, if documented non-compliance / non-adherence? n/a   Any social issues identified prior to discharge? No   Did you assess the readiness or willingness of the family or caregiver to support self management of care? n/a

## 2017-12-18 NOTE — PLAN OF CARE
Problem: Patient Care Overview  Goal: Plan of Care Review  Outcome: Ongoing (interventions implemented as appropriate)  Pt free of trauma, falls, and injury. Pt VSS and afebrile throughout shift. Pt free of skin breakdown. Pt had no complaints throughout shift. Pt has been eating and voiding adequately throughout shift. Pt has call light in reach, bed alarm on, bed brakes on, side rails up x2, bed in low position, SCDs on, and nonskid socks on. Pt lying in bed in no distress with family at bedside.        all other ROS negative except as per HPI

## 2019-02-20 ENCOUNTER — HOSPITAL ENCOUNTER (EMERGENCY)
Facility: OTHER | Age: 84
Discharge: HOME | End: 2019-02-20
Attending: EMERGENCY MEDICINE
Payer: MEDICARE

## 2019-02-20 VITALS
RESPIRATION RATE: 17 BRPM | DIASTOLIC BLOOD PRESSURE: 72 MMHG | HEIGHT: 64 IN | SYSTOLIC BLOOD PRESSURE: 144 MMHG | TEMPERATURE: 99 F | WEIGHT: 170 LBS | HEART RATE: 85 BPM | OXYGEN SATURATION: 97 % | BODY MASS INDEX: 29.02 KG/M2

## 2019-02-20 DIAGNOSIS — R55 SYNCOPE: ICD-10-CM

## 2019-02-20 LAB
ALBUMIN SERPL BCP-MCNC: 3.3 G/DL
ALP SERPL-CCNC: 61 U/L
ALT SERPL W/O P-5'-P-CCNC: 7 U/L
ANION GAP SERPL CALC-SCNC: 10 MMOL/L
AST SERPL-CCNC: 16 U/L
BASOPHILS # BLD AUTO: 0.03 K/UL
BASOPHILS NFR BLD: 0.4 %
BILIRUB SERPL-MCNC: 0.6 MG/DL
BUN SERPL-MCNC: 18 MG/DL
CALCIUM SERPL-MCNC: 8.9 MG/DL
CHLORIDE SERPL-SCNC: 106 MMOL/L
CO2 SERPL-SCNC: 25 MMOL/L
CREAT SERPL-MCNC: 1.6 MG/DL
DIFFERENTIAL METHOD: ABNORMAL
EOSINOPHIL # BLD AUTO: 0.1 K/UL
EOSINOPHIL NFR BLD: 1.1 %
ERYTHROCYTE [DISTWIDTH] IN BLOOD BY AUTOMATED COUNT: 13.9 %
EST. GFR  (AFRICAN AMERICAN): 32 ML/MIN/1.73 M^2
EST. GFR  (NON AFRICAN AMERICAN): 28 ML/MIN/1.73 M^2
GLUCOSE SERPL-MCNC: 131 MG/DL
HCT VFR BLD AUTO: 31.5 %
HGB BLD-MCNC: 10 G/DL
LYMPHOCYTES # BLD AUTO: 1 K/UL
LYMPHOCYTES NFR BLD: 13.2 %
MAGNESIUM SERPL-MCNC: 1.8 MG/DL
MCH RBC QN AUTO: 27.3 PG
MCHC RBC AUTO-ENTMCNC: 31.7 G/DL
MCV RBC AUTO: 86 FL
MONOCYTES # BLD AUTO: 0.4 K/UL
MONOCYTES NFR BLD: 5.1 %
NEUTROPHILS # BLD AUTO: 5.7 K/UL
NEUTROPHILS NFR BLD: 79.9 %
PLATELET # BLD AUTO: 260 K/UL
PMV BLD AUTO: 11 FL
POCT GLUCOSE: 100 MG/DL (ref 70–110)
POTASSIUM SERPL-SCNC: 3.4 MMOL/L
PROT SERPL-MCNC: 6.9 G/DL
RBC # BLD AUTO: 3.66 M/UL
SODIUM SERPL-SCNC: 141 MMOL/L
TROPONIN I SERPL DL<=0.01 NG/ML-MCNC: 0.01 NG/ML
WBC # BLD AUTO: 7.19 K/UL

## 2019-02-20 PROCEDURE — 83735 ASSAY OF MAGNESIUM: CPT

## 2019-02-20 PROCEDURE — 80053 COMPREHEN METABOLIC PANEL: CPT

## 2019-02-20 PROCEDURE — 93010 EKG 12-LEAD: ICD-10-PCS | Mod: ,,, | Performed by: INTERNAL MEDICINE

## 2019-02-20 PROCEDURE — 82962 GLUCOSE BLOOD TEST: CPT

## 2019-02-20 PROCEDURE — 99283 EMERGENCY DEPT VISIT LOW MDM: CPT | Mod: 25

## 2019-02-20 PROCEDURE — 85025 COMPLETE CBC W/AUTO DIFF WBC: CPT

## 2019-02-20 PROCEDURE — 84484 ASSAY OF TROPONIN QUANT: CPT

## 2019-02-20 PROCEDURE — 93005 ELECTROCARDIOGRAM TRACING: CPT

## 2019-02-20 PROCEDURE — 93010 ELECTROCARDIOGRAM REPORT: CPT | Mod: ,,, | Performed by: INTERNAL MEDICINE

## 2019-02-20 NOTE — ED NOTES
Pt aware of need for urine. Specimen cup at bedside. Pt and family instructed on use of call light, to press red button when ready to void and someone will assist.

## 2019-02-20 NOTE — ED NOTES
Pt sitting upright in bed, respirations even and unlabored, appears in no acute distress. Talking to adult female at bedside. Remains on continuous cardiac monitor, pulse ox, BP cycling q 30. Bed in lowest, locked position, side rails up x 2, call light within reach.

## 2019-02-20 NOTE — ED NOTES
Dr. Jaramillo at bedside. Pt sitting upright in bed, respirations even and unlabored, appears in no acute distress. Remains on continuous cardiac monitor, pulse ox, BP cycling q 30.

## 2019-02-20 NOTE — ED PROVIDER NOTES
"Encounter Date: 2/20/2019    SCRIBE #1 NOTE: I, River Morillo, am scribing for, and in the presence of, Dr. Jaramillo.       History     Chief Complaint   Patient presents with    Loss of Consciousness     per [facility] pt transported from home w/ witnessed syncope from family lasting approx 5 minutes " she was sitting in chair and just went unresponsive for a few minutes and couldn't answer us, hunched over". EMS reports on scene pt [id number] upon arrival.      Time seen by provider: 12:03 PM    This is a 89 y.o. female with a history of HTN and HLD who presents with complaint of syncope that occurred prior to arrival. The patient reports that she was eating her breakfast and drinking coffee, when she began to feel dizzy. The patient's daughter reports that she sat down and started to sort the mail. The next thing the patient reports that her son was fanning her and trying to wake her up. She states that her son told her she was "out for five minutes". The patient's daughter reports that her cardiologist Dr. Granados, who believes that these episodes are caused by a "valve issue". The daughter reports that these episode have happened a couple of times in the past. She denies fever, sore throat, chest pain, shortness of breath, nausea, vomiting, and dysuria.       The history is provided by the patient and a relative (daughter).     Review of patient's allergies indicates:  No Known Allergies  Past Medical History:   Diagnosis Date    High cholesterol     Hypertension     Syncope      Past Surgical History:   Procedure Laterality Date    MYRINGOTOMY Right 3/2/2016    Performed by Teja Bianchi MD at [facility] OR     History reviewed. No pertinent family history.  Social History     Tobacco Use    Smoking status: Never Smoker    Smokeless tobacco: Never Used   Substance Use Topics    Alcohol use: No    Drug use: No     Review of Systems   Constitutional: Negative for fever.   HENT: Negative for sore " throat.    Respiratory: Negative for shortness of breath.    Cardiovascular: Negative for chest pain.   Gastrointestinal: Negative for nausea.   Genitourinary: Negative for dysuria.   Musculoskeletal: Negative for back pain.   Skin: Negative for rash.   Neurological: Positive for dizziness and syncope. Negative for seizures and weakness.   Hematological: Does not bruise/bleed easily.       Physical Exam     Initial Vitals [02/20/19 1110]   BP Pulse Resp Temp SpO2   (!) 153/68 67 16 98.4 °F (36.9 °C) (!) 94 %      MAP       --         Physical Exam    Nursing note and vitals reviewed.  Constitutional: She appears well-developed and well-nourished. She is not diaphoretic. No distress.   Appears frail.    HENT:   Head: Normocephalic and atraumatic.   Eyes: Conjunctivae and EOM are normal. Pupils are equal, round, and reactive to light.   No conjunctival pallor    Neck: Normal range of motion.   Cardiovascular: Normal rate and regular rhythm. Exam reveals no gallop and no friction rub.    Murmur heard.  Harsh 3/5 holosystolic murmur at left sternal boarder.    Pulmonary/Chest: Breath sounds normal. No respiratory distress. She has no wheezes. She has no rhonchi. She has no rales.   Abdominal: Soft. There is no tenderness. There is no rebound and no guarding.   Musculoskeletal: Normal range of motion. She exhibits no edema or tenderness.   No lower extremity edema.    Neurological: She is alert and oriented to person, place, and time.   Skin: Skin is warm and dry. No rash and no abscess noted. No erythema. No pallor.   Psychiatric: She has a normal mood and affect. Her behavior is normal. Judgment and thought content normal.         ED Course   Procedures  Labs Reviewed   CBC W/ AUTO DIFFERENTIAL - Abnormal; Notable for the following components:       Result Value    RBC 3.66 (*)     Hemoglobin 10.0 (*)     Hematocrit 31.5 (*)     MCHC 31.7 (*)     Gran% 79.9 (*)     Lymph% 13.2 (*)     All other components within normal  limits   COMPREHENSIVE METABOLIC PANEL - Abnormal; Notable for the following components:    Potassium 3.4 (*)     Glucose 131 (*)     Creatinine 1.6 (*)     Albumin 3.3 (*)     ALT 7 (*)     eGFR if  32 (*)     eGFR if non  28 (*)     All other components within normal limits   TROPONIN I   MAGNESIUM   URINALYSIS, REFLEX TO URINE CULTURE   POCT GLUCOSE   POCT GLUCOSE MONITORING CONTINUOUS     EKG Readings: (Independently Interpreted)   Initial Reading: No STEMI.   Normal sinus rhythm at a rate of 66 bpm. Narrow QRS. No QTC prolongation. Unchanged when compared to 2017.      ECG Results          EKG 12-lead (In process)  Result time 02/20/19 14:22:01    In process by Interface, Lab In [facility] (02/20/19 14:22:01)                 Narrative:    Test Reason : R55,    Vent. Rate : 066 BPM     Atrial Rate : 066 BPM     P-R Int : 204 ms          QRS Dur : 096 ms      QT Int : 396 ms       P-R-T Axes : 067 018 059 degrees     QTc Int : 415 ms      Normal sinus rhythm  LVH with repolarization abnormality  Abnormal ECG      Referred By: [email or web address]   SELF           Confirmed By:                             Imaging Results          X-Ray Chest 1 View (Final result)  Result time 02/20/19 12:37:40    Final result by Alfred Sam MD (02/20/19 12:37:40)                 Impression:      1. Hypoventilatory exam, no large focal consolidation.      Electronically signed by: Alfred Sam MD  Date:    02/20/2019  Time:    12:37             Narrative:    EXAMINATION:  XR CHEST 1 VIEW    CLINICAL HISTORY:  Syncope and collapse    TECHNIQUE:  Single frontal view of the chest was performed.    COMPARISON:  03/05/2017    FINDINGS:  The cardiomediastinal silhouette is prominent, magnified by technique, noting calcification and tortuosity of the aorta..  There is no pleural effusion.  The trachea is midline.  The lungs are symmetrically expanded bilaterally with coarse interstitial attenuation, accentuated  by a shallow inspiratory effort..  There is left basilar subsegmental atelectasis or scarring.  No large focal consolidation seen.  There is no pneumothorax.  The osseous structures are remarkable for degenerative changes, noting osteopenia..                              X-Rays:   Independently Interpreted Readings:   Chest X-Ray: No overt fluid overload. No pneumothorax. No focal infiltrate.      Medical Decision Making:   Initial Assessment:     Urgent evaluation of 89-year-old female with hypertension here after an episode of loss of consciousness as reported by the family.  Currently patient has no complaints, reports that she felt dizzy prior to the episode.  Her daughter reports that she was slumped over in her chair after eating breakfast, requiring vigorous stimulation, and then return to baseline after approximately 5 min.  No reported seizure activity, bowel or urinary incontinence.  And similar to prior episodes in past.  Patient has not had any recent medication changes, recent illness.  On exam patient is asymptomatic this time, and appropriate, at her baseline per family.  Per [facility] review, patient was last admitted in 2017 for similar event.  Patient had an echo at that time, EF 60%, diastolic dysfunction, aortic regurg, moderate to severe aortic stenosis, and mild tricuspid regurg and has since followed with her Cardiologist Dr Granados.   Clinical Tests:   Lab Tests: Ordered and Reviewed  Radiological Study: Ordered and Reviewed  Medical Tests: Ordered and Reviewed  ED Management:  Labs obtained, notable for no acute anemia, creatinine at baseline, negative troponin, no evidence of florid CHF.  I discussed with the patient and her family members option of placement on observation for continued cardiac monitoring, but they requested discharge home given her age and goals to avoid invasive workup, request that I speak to her cardiologist.   and I discussed her presentation and workup, and he  was agreeable to this plan given that she present to his clinic tomorrow.  Had extensive discussion with the patient and her daughter Roselyn, and they understand that she is at risk for lethal dysrhythmias given her age and known aortic stenosis but are preferring to go home with knowledge she may have recurrent syncope, falls, coma and possible death.            Scribe Attestation:   Scribe #1: I performed the above scribed service and the documentation accurately describes the services I performed. I attest to the accuracy of the note.    Attending Attestation:           Physician Attestation for Scribe:  Physician Attestation Statement for Scribe #1: I, Dr. Jaramillo, reviewed documentation, as scribed by River Morillo in my presence, and it is both accurate and complete.                    Clinical Impression:     1. Syncope            Disposition:   Disposition: Discharged  Condition: Syd Jaramillo MD  02/20/19 5288

## 2019-02-20 NOTE — ED TRIAGE NOTES
"Family reports pt passed out for about 5 minutes. "She had just eaten and taken her medicine and sat down and went to feeling a little dizzy. She was slumped over in the chair and we fanned her. She came to after about 5 minutes." pt did not fall or hit head. Reports similar episode 2 years ago. Denies any recent illness. Family reports she lost her brother approximately 2 years ago. Pt oriented to person and place. Not oriented to month. Baseline per family.  "

## 2019-02-22 DIAGNOSIS — I35.0 NODULAR CALCIFIC AORTIC VALVE STENOSIS: Primary | ICD-10-CM

## 2019-03-21 RX ORDER — OMEPRAZOLE 20 MG/1
CAPSULE, DELAYED RELEASE ORAL
Refills: 1 | Status: ON HOLD | COMMUNITY
Start: 2019-01-22 | End: 2020-01-01

## 2019-03-26 ENCOUNTER — DOCUMENTATION ONLY (OUTPATIENT)
Dept: CARDIOLOGY | Facility: CLINIC | Age: 84
End: 2019-03-26

## 2019-03-26 RX ORDER — METOPROLOL SUCCINATE 50 MG/1
50 TABLET, EXTENDED RELEASE ORAL DAILY
COMMUNITY
End: 2019-03-27 | Stop reason: SDUPTHER

## 2019-03-26 RX ORDER — ASPIRIN 81 MG/1
81 TABLET ORAL DAILY
COMMUNITY
End: 2019-03-27 | Stop reason: SDUPTHER

## 2019-03-26 RX ORDER — ROSUVASTATIN CALCIUM 5 MG/1
5 TABLET, COATED ORAL DAILY
COMMUNITY
End: 2019-03-27 | Stop reason: SDUPTHER

## 2019-03-26 RX ORDER — AMLODIPINE AND BENAZEPRIL HYDROCHLORIDE 10; 20 MG/1; MG/1
1 CAPSULE ORAL DAILY
COMMUNITY
End: 2019-03-27

## 2019-03-27 ENCOUNTER — OFFICE VISIT (OUTPATIENT)
Dept: CARDIOLOGY | Facility: CLINIC | Age: 84
End: 2019-03-27
Payer: MEDICARE

## 2019-03-27 ENCOUNTER — HOSPITAL ENCOUNTER (OUTPATIENT)
Dept: CARDIOLOGY | Facility: CLINIC | Age: 84
Discharge: HOME | End: 2019-03-27
Attending: INTERNAL MEDICINE
Payer: MEDICARE

## 2019-03-27 VITALS
HEIGHT: 67 IN | DIASTOLIC BLOOD PRESSURE: 78 MMHG | HEART RATE: 65 BPM | WEIGHT: 114 LBS | BODY MASS INDEX: 17.89 KG/M2 | SYSTOLIC BLOOD PRESSURE: 146 MMHG

## 2019-03-27 VITALS
SYSTOLIC BLOOD PRESSURE: 159 MMHG | BODY MASS INDEX: 2.02 KG/M2 | HEIGHT: 64 IN | WEIGHT: 11.81 LBS | HEART RATE: 71 BPM | DIASTOLIC BLOOD PRESSURE: 74 MMHG | OXYGEN SATURATION: 99 %

## 2019-03-27 DIAGNOSIS — I10 HYPERTENSION, UNSPECIFIED TYPE: ICD-10-CM

## 2019-03-27 DIAGNOSIS — E78.5 HYPERLIPIDEMIA, UNSPECIFIED HYPERLIPIDEMIA TYPE: ICD-10-CM

## 2019-03-27 DIAGNOSIS — I35.0 SEVERE AORTIC STENOSIS: Primary | ICD-10-CM

## 2019-03-27 DIAGNOSIS — I35.0 NODULAR CALCIFIC AORTIC VALVE STENOSIS: Primary | ICD-10-CM

## 2019-03-27 DIAGNOSIS — I50.30 (HFPEF) HEART FAILURE WITH PRESERVED EJECTION FRACTION: ICD-10-CM

## 2019-03-27 DIAGNOSIS — I35.0 NODULAR CALCIFIC AORTIC VALVE STENOSIS: ICD-10-CM

## 2019-03-27 LAB
ASCENDING AORTA: 2.7 CM
AV INDEX (PROSTH): 0.32
AV MEAN GRADIENT: 30.43 MMHG
AV PEAK GRADIENT: 45.97 MMHG
AV VALVE AREA: 1.2 CM2
AV VELOCITY RATIO: 0.29
BSA FOR ECHO PROCEDURE: 1.56 M2
CV ECHO LV RWT: 0.47 CM
DOP CALC AO PEAK VEL: 3.39 M/S
DOP CALC AO VTI: 78.09 CM
DOP CALC LVOT AREA: 3.8 CM2
DOP CALC LVOT DIAMETER: 2.2 CM
DOP CALC LVOT PEAK VEL: 1 M/S
DOP CALC LVOT STROKE VOLUME: 93.54 CM3
DOP CALCLVOT PEAK VEL VTI: 24.62 CM
E WAVE DECELERATION TIME: 271.9 MSEC
E/A RATIO: 0.55
E/E' RATIO: 11.5
ECHO LV POSTERIOR WALL: 0.91 CM (ref 0.6–1.1)
FRACTIONAL SHORTENING: 31 % (ref 28–44)
INTERVENTRICULAR SEPTUM: 0.89 CM (ref 0.6–1.1)
IVRT: 0.07 MSEC
LA MAJOR: 5.29 CM
LA MINOR: 5.4 CM
LA WIDTH: 4.31 CM
LEFT ATRIUM SIZE: 4.32 CM
LEFT ATRIUM VOLUME INDEX: 53.1 ML/M2
LEFT ATRIUM VOLUME: 84.58 CM3
LEFT INTERNAL DIMENSION IN SYSTOLE: 2.69 CM (ref 2.1–4)
LEFT VENTRICLE DIASTOLIC VOLUME INDEX: 41.14 ML/M2
LEFT VENTRICLE DIASTOLIC VOLUME: 65.51 ML
LEFT VENTRICLE MASS INDEX: 65.9 G/M2
LEFT VENTRICLE SYSTOLIC VOLUME INDEX: 16.7 ML/M2
LEFT VENTRICLE SYSTOLIC VOLUME: 26.65 ML
LEFT VENTRICULAR INTERNAL DIMENSION IN DIASTOLE: 3.89 CM (ref 3.5–6)
LEFT VENTRICULAR MASS: 104.9 G
LV LATERAL E/E' RATIO: 11.5
LV SEPTAL E/E' RATIO: 11.5
MV PEAK A VEL: 0.83 M/S
MV PEAK E VEL: 0.46 M/S
PISA TR MAX VEL: 2.35 M/S
PULM VEIN S/D RATIO: 1.14
PV PEAK D VEL: 0.44 M/S
PV PEAK S VEL: 0.5 M/S
RA MAJOR: 4.21 CM
RA PRESSURE: 3 MMHG
RA WIDTH: 3.7 CM
RIGHT VENTRICULAR END-DIASTOLIC DIMENSION: 2.52 CM
RV TISSUE DOPPLER FREE WALL SYSTOLIC VELOCITY 1 (APICAL 4 CHAMBER VIEW): 8.74 M/S
SINUS: 2.83 CM
STJ: 2.36 CM
TDI LATERAL: 0.04
TDI SEPTAL: 0.04
TDI: 0.04
TR MAX PG: 22.09 MMHG
TRICUSPID ANNULAR PLANE SYSTOLIC EXCURSION: 1.76 CM
TV REST PULMONARY ARTERY PRESSURE: 25 MMHG

## 2019-03-27 PROCEDURE — 93306 TRANSTHORACIC ECHO (TTE) COMPLETE (CUPID ONLY): ICD-10-PCS | Mod: S$GLB,,, | Performed by: INTERNAL MEDICINE

## 2019-03-27 PROCEDURE — 99215 OFFICE O/P EST HI 40 MIN: CPT | Mod: S$GLB,,, | Performed by: INTERNAL MEDICINE

## 2019-03-27 PROCEDURE — 99999 PR PBB SHADOW E&M-EST. PATIENT-LVL III: CPT | Mod: PBBFAC,,,

## 2019-03-27 PROCEDURE — 99999 PR PBB SHADOW E&M-EST. PATIENT-LVL III: ICD-10-PCS | Mod: PBBFAC,,,

## 2019-03-27 PROCEDURE — 93306 TTE W/DOPPLER COMPLETE: CPT | Mod: S$GLB,,, | Performed by: INTERNAL MEDICINE

## 2019-03-27 PROCEDURE — 99205 OFFICE O/P NEW HI 60 MIN: CPT | Mod: 25,S$GLB,, | Performed by: THORACIC SURGERY (CARDIOTHORACIC VASCULAR SURGERY)

## 2019-03-27 PROCEDURE — 99205 PR OFFICE/OUTPT VISIT, NEW, LEVL V, 60-74 MIN: ICD-10-PCS | Mod: 25,S$GLB,, | Performed by: THORACIC SURGERY (CARDIOTHORACIC VASCULAR SURGERY)

## 2019-03-27 PROCEDURE — 99215 PR OFFICE/OUTPT VISIT, EST, LEVL V, 40-54 MIN: ICD-10-PCS | Mod: S$GLB,,, | Performed by: INTERNAL MEDICINE

## 2019-03-27 NOTE — PROGRESS NOTES
Cardiothoracic Surgery  Transcatheter Aortic Valve Replacement Evaluation      SUBJECTIVE:     History of Present Illness:  Ignacio Oakes is a 89 y.o. female referred by Dr Granados for evaluation of aortic stenosis.  She has experienced multiple syncopal episodes over the past few years (about one every three years) the most recent of which resulted in a hospital admission, discharged 3/7/19 from [facility].  She was admitted for syncope and evaluation and treatment of a newly diagnosed heart murmur.  She had an ECHO which showed EF of 60%, with diastolic dysfunction, trivial aortic valve regurgitation, moderate aortic valve stenosis, and mild tricuspid regurgitation. Medical therapy was recommended and she was discharged.  She is referred to us for TAVR evaluation.  She reports no chest pain, SOB, BHAGAT, PND, orthopnea.     The patient has undergone the following TAVR work-up:   · ECHO (Date 3/27/19): Aortic valve area is 1.20 cm2; peak velocity is 3.4 m/s; mean gradient is 30 mmHg., EF= 60%.   · LHC : pending   · STS: pending  · Frailty: 2/4 gait and hand-   · Iliacs are pending  · LVOT area by CTA is pending  · Incidental findings on CT: pending  · Rhythm issues: NSR w/LVH  · PFTs: pending  · Comorbidities: HTN/HLD/HFpEF            Past Medical History:   Diagnosis Date    High cholesterol     Hyperlipidemia     Hypertension     Syncope     Syncope and collapse      Past Surgical History:   Procedure Laterality Date    MYRINGOTOMY Right 3/2/2016    Performed by Teja Bianchi MD at [facility] OR     No family history on file.  Social History     Tobacco Use    Smoking status: Never Smoker    Smokeless tobacco: Never Used   Substance Use Topics    Alcohol use: No    Drug use: No      Review of patient's allergies indicates:  Not on File  Current medications reviewed    Review of Systems:  Constitutional: Negative for fever, chills, distress  Skin: no acute disorders.  Negative for rash,  itching  HEENT: unremarkable.  Negative for sore throat, congestion  Cardiovascular: Negative for chest pain, orthopnea, lower extremity edema.    Respiratory: Negative for shortness of breath, cough.    GI:  unremarkable.  Negative for abdominal pain, vomiting  :  Negative for hematuria, flank pain  Musculoskeletal: unremarkable.  Negative for falls, myalgias  Neuro:  Negative for dizziness,positive for LOC  Psych:  Negative for substance abuse, memory loss      OBJECTIVE:     Vital Signs (Most Recent)     Vital signs reviewed    Physical Exam:  General Appearance: no acute distress.  Normal for age  Skin: no acute lesions, minor bruises from phlebotomy  HEENT: no masses/hematoma, Jugular veins: not distended  Resp:  excursion/effort normal; clear to auscultation  CV:  Rate:  regular  Rhythm: regular  Murmur:  systolic ejection murmur at right upper sternal border  GI: Bowel sounds: present; abdo soft, nondistended, nontender, no masses palpable  Extrem: Edema: minimal  Pulses: normal  Neuro: Alert and oriented; no focal deficit  Psych: no acute delirium noted  : voiding well  MSK: no acute findings, ranges of motion unchanged        I have personally reviewed the imaging, electrocardiogram, and pertinent lab findings    ASSESSMENT/PLAN:       89 y.o. female with multiple comorbidities including frailty presented with moderate aortic stenosis. Deemed inoperable for surgical aortic valve replacement (SAVR).  Due to moderate aortic stenosis, we will continue to monitor her with serial TTE

## 2019-03-27 NOTE — PROGRESS NOTES
Interventional Cardiology Clinic Note  Reason for Visit: Aortic Stenosis    HPI:   Ignacio Oakse is a 89 y.o. female referred by Dr Granados for evaluation of severe AS (NYHA Class III sx).  She has experienced multiple syncopal episodes the most recent of which resulted in a hospital admission, discharged 3/7/19 from [facility].  She was admitted for syncope and evaluation and treatment of a newly diagnosed heart murmur.  She had an ECHO which showed EF of 60%, with diastolic dysfunction, trivial aortic valve regurgitation, moderate to severe aortic valve stenosis, and mild tricuspid regurgitation. Medical therapy was recommended and she was discharged.  She is referred to us for TAVR evaluation.  She reports no chest pain, SOB, BHAGAT, PND, orthopnea.    The patient has undergone the following TAVR work-up:   ECHO (Date 3/27/19): Aortic valve area is 1.20 cm2; peak velocity is 3.4 m/s; mean gradient is 30 mmHg., EF= 60%.   LHC : pending   STS: pending  Frailty: 2/4 gait and hand-   Iliacs are pending  LVOT area by CTA is pending  Incidental findings on CT: pending  CT Surgery risk assessment: pending  Rhythm issues: NSR w/LVH  PFTs: pending  Comorbidities: HTN/HLD/HFpEF      Ignacio Oakes has moderate AS and will be followed Q6months with 2decho to assess severity of valve disease.    ROS:    Review of Systems   Constitution: Negative.   HENT: Negative.    Eyes: Negative.    Cardiovascular: Positive for syncope.   Respiratory: Negative.    Endocrine: Negative.    Skin: Negative.    Musculoskeletal: Negative.    Gastrointestinal: Negative.    Genitourinary: Negative.      PMH:     Past Medical History:   Diagnosis Date    High cholesterol     Hyperlipidemia     Hypertension     Syncope     Syncope and collapse      Past Surgical History:   Procedure Laterality Date    MYRINGOTOMY Right 3/2/2016    Performed by Teja Bianchi MD at [facility] OR     Allergies:   Review of patient's  "allergies indicates:  Not on File  Medications:     Current Outpatient Medications on File Prior to Visit   Medication Sig Dispense Refill    amlodipine-benazepril 10-20mg (LOTREL) 10-20 mg per capsule Take 1 capsule by mouth once daily.      aspirin (ECOTRIN) 81 MG EC tablet Take 81 mg by mouth once daily.      metoprolol succinate (TOPROL XL) 50 MG 24 hr tablet Take 50 mg by mouth once daily.      multivit-mineral-iron-lutein Tab Take by mouth.      omeprazole (PRILOSEC) 20 MG capsule TK 1 C PO QD  1    rosuvastatin (CRESTOR) 5 MG tablet Take 5 mg by mouth once daily.      [DISCONTINUED] amlodipine-benazepril 10-20mg (LOTREL) 10-20 mg per capsule Take 1 capsule by mouth once daily.      [DISCONTINUED] aspirin (ECOTRIN) 81 MG EC tablet Take 81 mg by mouth once daily.      [DISCONTINUED] folic acid/mv,iron,min/lutein (MULTIVIT-IRON-MIN-FA-LUTEIN ORAL) Take by mouth.      [DISCONTINUED] metoprolol succinate (TOPROL-XL) 50 MG 24 hr tablet Take 50 mg by mouth once daily.      [DISCONTINUED] omeprazole (PRILOSEC) 20 MG capsule Take 20 mg by mouth once daily.      [DISCONTINUED] rosuvastatin (CRESTOR) 5 MG tablet Take 5 mg by mouth once daily.       No current facility-administered medications on file prior to visit.      Social History:     Social History     Tobacco Use    Smoking status: Never Smoker    Smokeless tobacco: Never Used   Substance Use Topics    Alcohol use: No     Family History:   History reviewed. No pertinent family history.  Physical Exam:   BP (!) 159/74 (BP Location: Right arm, Patient Position: Sitting, BP Method: Large (Automatic))   Pulse 71   Ht 5' 3.5" (1.613 m)   Wt 5.35 kg (11 lb 12.7 oz)   LMP  (LMP Unknown)   SpO2 99%   BMI 2.06 kg/m²    Physical Exam   Constitutional: She is oriented to person, place, and time. She appears well-developed and well-nourished.   HENT:   Head: Normocephalic and atraumatic.   Eyes: Pupils are equal, round, and reactive to light. " Conjunctivae and EOM are normal.   Neck: Normal range of motion. Neck supple. No JVD present.   Cardiovascular: Normal rate and regular rhythm. Exam reveals no gallop and no friction rub.   Murmur (3/6 early systolic cresc/decr murmur with rads to carotids and LSIS) heard.  Pulmonary/Chest: Effort normal and breath sounds normal. No respiratory distress. She has no wheezes. She has no rales. She exhibits no tenderness.   Abdominal: Soft. Bowel sounds are normal. She exhibits no distension. There is no tenderness.   Musculoskeletal: Normal range of motion. She exhibits no edema or tenderness.   Neurological: She is alert and oriented to person, place, and time.   Skin: Skin is warm and dry. No erythema. No pallor.       Labs:     Lab Results   Component Value Date     03/27/2019    K 3.8 03/27/2019     03/27/2019    CO2 28 03/27/2019    BUN 19 03/27/2019    CREATININE 2.0 (H) 03/27/2019    ANIONGAP 6 (L) 03/27/2019     No results found for: HGBA1C  Lab Results   Component Value Date     (H) 03/05/2017    BNP 42 05/09/2015    Lab Results   Component Value Date    WBC 5.75 03/27/2019    HGB 11.0 (L) 03/27/2019    HCT 35.7 (L) 03/27/2019     03/27/2019    GRAN 4.2 03/27/2019    GRAN 72.3 03/27/2019     Lab Results   Component Value Date    CHOL 100 (L) 03/07/2017    HDL 33 (L) 03/07/2017    LDLCALC 57.0 (L) 03/07/2017    TRIG 50 03/07/2017          Imaging:   · Normal left ventricular systolic function. The estimated ejection fraction is 65%  · Concentric left ventricular remodeling.  · Indeterminate left ventricular diastolic function.  · No wall motion abnormalities.  · Bitrial enlargement.  · Normal right ventricular systolic function.  · Mild tricuspid regurgitation.  · Aortic valve area is 1.20 cm2; peak velocity is 3.4 m/s; mean gradient is 30 mmHg.  · Moderate aortic valve stenosis.  · The estimated PA systolic pressure is 25 mm Hg  · Normal central venous pressure (3 mm Hg).    EKG: NSR  LVH  Assessment:     1. Moderate aortic stenosis:  Aortic valve area is 1.20 cm2; peak velocity is 3.4 m/s; mean gradient is 30 mmHg, EF 60%.  Patient will be followed Q 6 months with 2decho to check for progression of disease.  Patient does not currently meet criteria for TAVR/SAVR.  Consider workup for other causes of syncope.   2. (HFpEF) heart failure with preserved ejection fraction: continue medical therapy including beta blockers and diuretics as indicated.  Currently euvolemic without any heart failure symptoms.    3. Hypertension, unspecified type: continue lotrel and toprol   4. Hyperlipidemia, unspecified hyperlipidemia type: continue crestor         Plan:   Moderate aortic stenosis  Not currently meeting criteria, periodic follow up  Consider workup for other causes of syncope    (HFpEF) heart failure with preserved ejection fraction  Bb and diuretics if needed    Hypertension, unspecified type  Continue lotrel and toprol    Hyperlipidemia, unspecified hyperlipidemia type  Continue statin therapy    Discussed with Dr. Garduno. RTC in 6 months.     Signed:  Jony Alexandre MD  3/27/2019 1:57 PM

## 2019-04-06 ENCOUNTER — OFFICE VISIT (OUTPATIENT)
Dept: URGENT CARE | Facility: CLINIC | Age: 84
End: 2019-04-06
Payer: MEDICARE

## 2019-04-06 VITALS
RESPIRATION RATE: 18 BRPM | WEIGHT: 120 LBS | SYSTOLIC BLOOD PRESSURE: 133 MMHG | HEIGHT: 63 IN | TEMPERATURE: 98 F | DIASTOLIC BLOOD PRESSURE: 69 MMHG | BODY MASS INDEX: 21.26 KG/M2 | OXYGEN SATURATION: 98 % | HEART RATE: 85 BPM

## 2019-04-06 DIAGNOSIS — L03.116 CELLULITIS OF LEFT LOWER EXTREMITY: ICD-10-CM

## 2019-04-06 DIAGNOSIS — M10.9 ACUTE GOUT OF MULTIPLE SITES, UNSPECIFIED CAUSE: ICD-10-CM

## 2019-04-06 DIAGNOSIS — Z87.39 HISTORY OF ACUTE GOUTY ARTHRITIS: ICD-10-CM

## 2019-04-06 DIAGNOSIS — N18.4 CKD (CHRONIC KIDNEY DISEASE) STAGE 4, GFR 15-29 ML/MIN: Primary | ICD-10-CM

## 2019-04-06 PROCEDURE — 99203 OFFICE O/P NEW LOW 30 MIN: CPT | Mod: 25,S$GLB,, | Performed by: NURSE PRACTITIONER

## 2019-04-06 PROCEDURE — 96372 THER/PROPH/DIAG INJ SC/IM: CPT | Mod: S$GLB,,, | Performed by: NURSE PRACTITIONER

## 2019-04-06 PROCEDURE — 99203 PR OFFICE/OUTPT VISIT, NEW, LEVL III, 30-44 MIN: ICD-10-PCS | Mod: 25,S$GLB,, | Performed by: NURSE PRACTITIONER

## 2019-04-06 PROCEDURE — 96372 PR INJECTION,THERAP/PROPH/DIAG2ST, IM OR SUBCUT: ICD-10-PCS | Mod: S$GLB,,, | Performed by: NURSE PRACTITIONER

## 2019-04-06 RX ORDER — PREDNISONE 20 MG/1
20 TABLET ORAL DAILY
Qty: 5 TABLET | Refills: 0 | Status: SHIPPED | OUTPATIENT
Start: 2019-04-06 | End: 2019-04-11

## 2019-04-06 RX ORDER — CEPHALEXIN 250 MG/1
500 CAPSULE ORAL EVERY 12 HOURS
Qty: 40 CAPSULE | Refills: 0 | Status: SHIPPED | OUTPATIENT
Start: 2019-04-06 | End: 2019-04-16

## 2019-04-06 RX ORDER — BETAMETHASONE SODIUM PHOSPHATE AND BETAMETHASONE ACETATE 3; 3 MG/ML; MG/ML
6 INJECTION, SUSPENSION INTRA-ARTICULAR; INTRALESIONAL; INTRAMUSCULAR; SOFT TISSUE
Status: COMPLETED | OUTPATIENT
Start: 2019-04-06 | End: 2019-04-06

## 2019-04-06 RX ADMIN — BETAMETHASONE SODIUM PHOSPHATE AND BETAMETHASONE ACETATE 6 MG: 3; 3 INJECTION, SUSPENSION INTRA-ARTICULAR; INTRALESIONAL; INTRAMUSCULAR; SOFT TISSUE at 03:04

## 2019-04-06 NOTE — PATIENT INSTRUCTIONS
Follow up with your doctor in a few days.  Return to the urgent care or go to the ER if symptoms get worse.    Start oral steroid tomorrow as directed.  Antibiotic (keflex)-wait and see. If swelling/redness/warmth does not improve in 24 hours-start keflex.  Elevate when possible, cool compress 2-3 times a day.  Avoid seafood.      Treating Gout Attacks     Raising the joint above the level of your heart can help reduce gout symptoms.     Gout is a disease that affects the joints. It is caused by excess uric acid in your blood stream that may lead to crystals forming in your joints. Left untreated, it can lead to painful foot and joint deformities and even kidney problems. But, by treating gout early, you can relieve pain and help prevent future problems. Gout can usually be treated with medication and proper diet. In severe cases, surgery may be needed.  Gout attacks are painful and often happen more than once. Taking medications may reduce pain and prevent attacks in the future. There are also some things you can do at home to relieve symptoms.  Medications for gout  Your healthcare provider may prescribe a daily medication to reduce levels of uric acid. Reducing your uric acid levels may help prevent gout attacks. Allopurinol is one commonly used medication taken daily to reduce uric acid levels. Other medications can help relieve pain and swelling during an acute attack. Medicines such as NSAIDs (nonsteroidal anti-inflammatory medicines), steroids, and colchicine may be prescribed for intermittent use to relieve an acute gout attack. Be sure to take your medication as directed.  What you can do  Below are some things you can do at home to relieve gout symptoms. Your healthcare provider may have other tips.  · Rest the painful joint as much as you can.  · Raise the painful joint so it is at a level higher than your heart.  · Use ice for 10 minutes every 1-2 hours as possible.  How can I prevent gout?  With a  little effort, you may be able to prevent gout attacks in the future. Here are some things you can do:  · Avoid foods high in purines  ¨ Certain meats (red meat, processed meat, turkey)  ¨ Organ meats (kidney, liver, sweetbread)  ¨ Shellfish (lobster, crab, shrimp, scallop, mussel)  ¨ Certain fish (anchovy, sardine, herring, mackerel)  · Take any medications prescribed by your healthcare provider.  · Lose weight if you need to.  · Reduce high fructose corn syrup in meals and drinks.  · Reduce or eliminate consumption of alcohol, particularly beer, but also red wine and spirits.  · Control blood pressure, diabetes, and cholesterol.  · Drink plenty of water to help flush uric acid from your body.  Date Last Reviewed: 2/1/2016 © 2000-2017 [employer]. [address] [place] [address]. All rights reserved. This information is not intended as a substitute for professional medical care. Always follow your healthcare professional's instructions.        Eating to Prevent Gout  Gout is a painful form of arthritis caused by an excess of uric acid. This is a waste product made by the body. It builds up in the body and forms crystals that collect in the joints, bringing on a gout attack. Alcohol and certain foods can trigger a gout attack. Below are some guidelines for changing your diet to help you manage gout. Your healthcare provider can work with you to determine the best eating plan for you. Know that diet is only one part of managing gout. Take your medicines as prescribed and follow the other guidelines your healthcare provider has given you.  Foods to limit  Eating too many foods containing purines may increase the levels of uric acid in your body and increase your risk for a gout attack. It may be best to limit these high-purine foods:  · Alcohol (beer, red wine). You may be told to avoid alcohol completely.  · Certain fish (anchovies, sardines, fish roes, herring, tuna, mussels, codfish,  scallops, trout, and delia)  · Certain meats (red meat, processed meat, vann, turkey, wild game, and goose)  · Sauces and gravies made with meat  · Organ meats (such as liver, kidneys, sweetbreads, and tripe)  · Legumes (such as dried beans, peas)  · Mushrooms, spinach, asparagus, and cauliflower  · Yeast and yeast extract supplements  Foods to try  Some foods may be helpful for people with gout. You may want to try adding some of the following foods to your diet:  · Dark berries: These include blueberries, blackberries, and cherries. These berries contain chemicals that may lower uric acid.  · Tofu: Tofu, which is made from soy, is a good source of protein. Studies have shown that it may be a better choice than meat for people with gout.  · Omega fatty acids: These acids are found in fatty fish (such as salmon), certain oils (such as flax, olive, or nut oils), or nuts. They may help prevent inflammation due to gout.  The following guidelines are recommended by the American Medical Association for people with gout. Your diet should be:  · High in fiber, whole grains, fruits, and vegetables.  · Low in protein (15% of calories should come from protein. Choose lean sources such as soy, lean meats, and poultry).  · Low in fat (no more than 30% of calories should come from fat, with only 10% coming from animal fat).   Date Last Reviewed: 6/17/2015 © 2000-2017 [employer]. [address] [place] [address], [place] [address]. All rights reserved. This information is not intended as a substitute for professional medical care. Always follow your healthcare professional's instructions.        Cellulitis  Cellulitis is an infection of the deep layers of skin. A break in the skin, such as a cut or scratch, can let bacteria under the skin. If the bacteria get to deep layers of the skin, it can be serious. If not treated, cellulitis can get into the bloodstream and lymph nodes. The infection can then spread throughout the  body. This causes serious illness.  Cellulitis causes the affected skin to become red, swollen, warm, and sore. The reddened areas have a visible border. An open sore may leak fluid (pus). You may have a fever, chills, and pain.  Cellulitis is treated with antibiotics taken for 7 to 10 days. An open sore may be cleaned and covered with cool wet gauze. Symptoms should get better 1 to 2 days after treatment is started. Make sure to take all the antibiotics for the full number of days until they are gone. Keep taking the medicine even if your symptoms go away.  Home care  Follow these tips:  · Limit the use of the part of your body with cellulitis.   · If the infection is on your leg, keep your leg raised while sitting. This will help to reduce swelling.  · Take all of the antibiotic medicine exactly as directed until it is gone. Do not miss any doses, especially during the first 7 days. Dont stop taking the medicine when your symptoms get better.  · Keep the affected area clean and dry.  · Wash your hands with soap and warm water before and after touching your skin. Anyone else who touches your skin should also wash his or her hands. Don't share towels.  Follow-up care  Follow up with your healthcare provider, or as advised. If your infection does not go away on the first antibiotic, your healthcare provider will prescribe a different one.  When to seek medical advice  Call your healthcare provider right away if any of these occur:  · Red areas that spread  · Swelling or pain that gets worse  · Fluid leaking from the skin (pus)  · Fever higher of 100.4º F (38.0º C) or higher after 2 days on antibiotics  Date Last Reviewed: 9/1/2016  © 1345-3712 [employer] [address] [place] [address], [place] [address]. All rights reserved. This information is not intended as a substitute for professional medical care. Always follow your healthcare professional's instructions.

## 2019-04-06 NOTE — PROGRESS NOTES
"Subjective:       Patient ID: Ignacio Oakes is a 89 y.o. female.    Vitals:  height is 5' 3" (1.6 m) and weight is 54.4 kg (120 lb). Her oral temperature is 97.7 °F (36.5 °C). Her blood pressure is 133/69 and her pulse is 85. Her respiration is 18 and oxygen saturation is 98%.     Chief Complaint: Gout    New problem c/o left foot swelling for 7 days and states painful. Reports eating seafood last weekend. Hx of gouty arthritis in feet. [facility] ckd stage 4.     Edema   This is a new problem. The current episode started in the past 7 days. The problem occurs constantly. The problem has been gradually worsening. Pertinent negatives include no arthralgias, chest pain, chills, congestion, coughing, fatigue, fever, headaches, joint swelling, myalgias, nausea, rash, sore throat, vertigo, vomiting or weakness. The symptoms are aggravated by standing, twisting and walking. She has tried nothing for the symptoms. The treatment provided no relief.       Constitution: Negative for chills, fatigue and fever.   HENT: Negative for congestion and sore throat.    Neck: Negative for painful lymph nodes.   Cardiovascular: Negative for chest pain and leg swelling.   Eyes: Negative for double vision and blurred vision.   Respiratory: Negative for cough and shortness of breath.    Gastrointestinal: Negative for nausea, vomiting and diarrhea.   Genitourinary: Negative for dysuria, frequency, urgency and history of kidney stones.   Musculoskeletal: Negative for joint pain, joint swelling, muscle cramps and muscle ache.   Skin: Negative for color change, pale, rash and bruising.   Allergic/Immunologic: Negative for seasonal allergies.   Neurological: Negative for dizziness, history of vertigo, light-headedness, passing out and headaches.   Hematologic/Lymphatic: Negative for swollen lymph nodes.   Psychiatric/Behavioral: Negative for nervous/anxious, sleep disturbance and depression. The patient is not nervous/anxious.        Objective:    "   Physical Exam   Constitutional: She is oriented to person, place, and time. She appears well-developed and well-nourished. She is cooperative.  Non-toxic appearance. She does not appear ill. No distress.   HENT:   Head: Normocephalic and atraumatic. Head is without abrasion, without contusion and without laceration.   Right Ear: Hearing, tympanic membrane, external ear and ear canal normal. No hemotympanum.   Left Ear: Hearing, tympanic membrane, external ear and ear canal normal. No hemotympanum.   Nose: Nose normal. No mucosal edema, rhinorrhea or nasal deformity. No epistaxis. Right sinus exhibits no maxillary sinus tenderness and no frontal sinus tenderness. Left sinus exhibits no maxillary sinus tenderness and no frontal sinus tenderness.   Mouth/Throat: Uvula is midline, oropharynx is clear and moist and mucous membranes are normal. No trismus in the jaw. Normal dentition. No uvula swelling. No posterior oropharyngeal erythema.   Eyes: Pupils are equal, round, and reactive to light. Conjunctivae, EOM and lids are normal. Right eye exhibits no discharge. Left eye exhibits no discharge. No scleral icterus.   Sclera clear bilat   Neck: Trachea normal, normal range of motion, full passive range of motion without pain and phonation normal. Neck supple. No spinous process tenderness and no muscular tenderness present. No neck rigidity. No tracheal deviation present.   Cardiovascular: Normal rate, regular rhythm, normal heart sounds, intact distal pulses and normal pulses.   Pulmonary/Chest: Effort normal and breath sounds normal. No respiratory distress.   Abdominal: Soft. Normal appearance and bowel sounds are normal. She exhibits no distension, no pulsatile midline mass and no mass. There is no tenderness.   Musculoskeletal: She exhibits no edema or deformity.        Right foot: There is decreased range of motion, tenderness, bony tenderness and swelling. There is normal capillary refill and no laceration.         Feet:    Neurological: She is alert and oriented to person, place, and time. She has normal strength. No cranial nerve deficit or sensory deficit. She exhibits normal muscle tone. She displays no seizure activity. Coordination normal. GCS eye subscore is 4. GCS verbal subscore is 5. GCS motor subscore is 6.   Skin: Skin is warm, dry and intact. Capillary refill takes less than 2 seconds. No abrasion, no bruising, no burn, no ecchymosis and no laceration noted. She is not diaphoretic. No pallor.   Psychiatric: She has a normal mood and affect. Her speech is normal and behavior is normal. Judgment and thought content normal. Cognition and memory are normal.   Nursing note and vitals reviewed.      Assessment:       1. CKD (chronic kidney disease) stage 4, GFR 15-29 ml/min    2. History of acute gouty arthritis    3. Acute gout of multiple sites, unspecified cause    4. Cellulitis of left lower extremity        Plan:         CKD (chronic kidney disease) stage 4, GFR 15-29 ml/min    History of acute gouty arthritis    Acute gout of multiple sites, unspecified cause  -     betamethasone acetate-betamethasone sodium phosphate injection 6 mg  -     predniSONE (DELTASONE) 20 MG tablet; Take 1 tablet (20 mg total) by mouth once daily. for 5 days  Dispense: 5 tablet; Refill: 0    Cellulitis of left lower extremity  -     cephALEXin (KEFLEX) 250 MG capsule; Take 2 capsules (500 mg total) by mouth every 12 (twelve) hours. for 10 days  Dispense: 40 capsule; Refill: 0      Patient Instructions     Follow up with your doctor in a few days.  Return to the urgent care or go to the ER if symptoms get worse.    Start oral steroid tomorrow as directed.  Antibiotic (keflex)-wait and see. If swelling/redness/warmth does not improve in 24 hours-start keflex.  Elevate when possible, cool compress 2-3 times a day.  Avoid seafood.      Treating Gout Attacks     Raising the joint above the level of your heart can help reduce gout symptoms.      Gout is a disease that affects the joints. It is caused by excess uric acid in your blood stream that may lead to crystals forming in your joints. Left untreated, it can lead to painful foot and joint deformities and even kidney problems. But, by treating gout early, you can relieve pain and help prevent future problems. Gout can usually be treated with medication and proper diet. In severe cases, surgery may be needed.  Gout attacks are painful and often happen more than once. Taking medications may reduce pain and prevent attacks in the future. There are also some things you can do at home to relieve symptoms.  Medications for gout  Your healthcare provider may prescribe a daily medication to reduce levels of uric acid. Reducing your uric acid levels may help prevent gout attacks. Allopurinol is one commonly used medication taken daily to reduce uric acid levels. Other medications can help relieve pain and swelling during an acute attack. Medicines such as NSAIDs (nonsteroidal anti-inflammatory medicines), steroids, and colchicine may be prescribed for intermittent use to relieve an acute gout attack. Be sure to take your medication as directed.  What you can do  Below are some things you can do at home to relieve gout symptoms. Your healthcare provider may have other tips.  · Rest the painful joint as much as you can.  · Raise the painful joint so it is at a level higher than your heart.  · Use ice for 10 minutes every 1-2 hours as possible.  How can I prevent gout?  With a little effort, you may be able to prevent gout attacks in the future. Here are some things you can do:  · Avoid foods high in purines  ¨ Certain meats (red meat, processed meat, turkey)  ¨ Organ meats (kidney, liver, sweetbread)  ¨ Shellfish (lobster, crab, shrimp, scallop, mussel)  ¨ Certain fish (anchovy, sardine, herring, mackerel)  · Take any medications prescribed by your healthcare provider.  · Lose weight if you need to.  · Reduce  high fructose corn syrup in meals and drinks.  · Reduce or eliminate consumption of alcohol, particularly beer, but also red wine and spirits.  · Control blood pressure, diabetes, and cholesterol.  · Drink plenty of water to help flush uric acid from your body.  Date Last Reviewed: 2/1/2016  © 9344-8258 [employer]. [address] [place] [address], [place] [address]. All rights reserved. This information is not intended as a substitute for professional medical care. Always follow your healthcare professional's instructions.        Eating to Prevent Gout  Gout is a painful form of arthritis caused by an excess of uric acid. This is a waste product made by the body. It builds up in the body and forms crystals that collect in the joints, bringing on a gout attack. Alcohol and certain foods can trigger a gout attack. Below are some guidelines for changing your diet to help you manage gout. Your healthcare provider can work with you to determine the best eating plan for you. Know that diet is only one part of managing gout. Take your medicines as prescribed and follow the other guidelines your healthcare provider has given you.  Foods to limit  Eating too many foods containing purines may increase the levels of uric acid in your body and increase your risk for a gout attack. It may be best to limit these high-purine foods:  · Alcohol (beer, red wine). You may be told to avoid alcohol completely.  · Certain fish (anchovies, sardines, fish roes, herring, tuna, mussels, codfish, scallops, trout, and delia)  · Certain meats (red meat, processed meat, vann, turkey, wild game, and goose)  · Sauces and gravies made with meat  · Organ meats (such as liver, kidneys, sweetbreads, and tripe)  · Legumes (such as dried beans, peas)  · Mushrooms, spinach, asparagus, and cauliflower  · Yeast and yeast extract supplements  Foods to try  Some foods may be helpful for people with gout. You may want to try adding some of the  following foods to your diet:  · Dark berries: These include blueberries, blackberries, and cherries. These berries contain chemicals that may lower uric acid.  · Tofu: Tofu, which is made from soy, is a good source of protein. Studies have shown that it may be a better choice than meat for people with gout.  · Omega fatty acids: These acids are found in fatty fish (such as salmon), certain oils (such as flax, olive, or nut oils), or nuts. They may help prevent inflammation due to gout.  The following guidelines are recommended by the American Medical Association for people with gout. Your diet should be:  · High in fiber, whole grains, fruits, and vegetables.  · Low in protein (15% of calories should come from protein. Choose lean sources such as soy, lean meats, and poultry).  · Low in fat (no more than 30% of calories should come from fat, with only 10% coming from animal fat).   Date Last Reviewed: 6/17/2015 © 2000-2017 [employer]. [address] [place] [address]. All rights reserved. This information is not intended as a substitute for professional medical care. Always follow your healthcare professional's instructions.        Cellulitis  Cellulitis is an infection of the deep layers of skin. A break in the skin, such as a cut or scratch, can let bacteria under the skin. If the bacteria get to deep layers of the skin, it can be serious. If not treated, cellulitis can get into the bloodstream and lymph nodes. The infection can then spread throughout the body. This causes serious illness.  Cellulitis causes the affected skin to become red, swollen, warm, and sore. The reddened areas have a visible border. An open sore may leak fluid (pus). You may have a fever, chills, and pain.  Cellulitis is treated with antibiotics taken for 7 to 10 days. An open sore may be cleaned and covered with cool wet gauze. Symptoms should get better 1 to 2 days after treatment is started. Make sure to take  all the antibiotics for the full number of days until they are gone. Keep taking the medicine even if your symptoms go away.  Home care  Follow these tips:  · Limit the use of the part of your body with cellulitis.   · If the infection is on your leg, keep your leg raised while sitting. This will help to reduce swelling.  · Take all of the antibiotic medicine exactly as directed until it is gone. Do not miss any doses, especially during the first 7 days. Dont stop taking the medicine when your symptoms get better.  · Keep the affected area clean and dry.  · Wash your hands with soap and warm water before and after touching your skin. Anyone else who touches your skin should also wash his or her hands. Don't share towels.  Follow-up care  Follow up with your healthcare provider, or as advised. If your infection does not go away on the first antibiotic, your healthcare provider will prescribe a different one.  When to seek medical advice  Call your healthcare provider right away if any of these occur:  · Red areas that spread  · Swelling or pain that gets worse  · Fluid leaking from the skin (pus)  · Fever higher of 100.4º F (38.0º C) or higher after 2 days on antibiotics  Date Last Reviewed: 9/1/2016  © 5500-3081 [employer]. [address] [place] [address], [place] [address]. All rights reserved. This information is not intended as a substitute for professional medical care. Always follow your healthcare professional's instructions.

## 2019-06-09 ENCOUNTER — HOSPITAL ENCOUNTER (EMERGENCY)
Facility: OTHER | Age: 84
Discharge: HOME | End: 2019-06-09
Attending: EMERGENCY MEDICINE
Payer: MEDICARE

## 2019-06-09 VITALS
WEIGHT: 120 LBS | SYSTOLIC BLOOD PRESSURE: 159 MMHG | RESPIRATION RATE: 16 BRPM | HEART RATE: 68 BPM | BODY MASS INDEX: 18.83 KG/M2 | OXYGEN SATURATION: 100 % | HEIGHT: 67 IN | DIASTOLIC BLOOD PRESSURE: 72 MMHG | TEMPERATURE: 98 F

## 2019-06-09 DIAGNOSIS — S00.83XA TRAUMATIC HEMATOMA OF FOREHEAD, INITIAL ENCOUNTER: Primary | ICD-10-CM

## 2019-06-09 PROCEDURE — 99284 EMERGENCY DEPT VISIT MOD MDM: CPT | Mod: 25

## 2019-06-10 NOTE — ED NOTES
Patient Identifiers for Ignacio B Jolla checked and correct  LOC: The patient is awake, alert and aware of environment with an appropriate affect, the patient is oriented x 3 and speaking appropriate.  APPEARANCE: Patient resting comfortably and in no acute distress. The patient is clean and well groomed. The patient's clothing is properly fastened.  SKIN: Hematoma noted above pt left eye brow skin is warm and dry. The patient has normal skin turgor and moist mucus membranes. No rashes or lesions upon observation. Skin Intact , no breakdown noted.  Musculoskeletal :  Normal range of motion noted. Moves all extremities well, No swelling or tenderness noted  RESPIRATORY: Airway is open and patent, respirations are spontaneous, patient has a normal effort and rate. Breath sounds are clear & equal, bilaterally.  CARDIAC: Patient has a normal rate and rhythm, no peripheral edema noted, capillary refill < 3 seconds.   ABDOMEN: Soft and non tender to palpation, no distention observed. Bowels Sounds are WNL all quads.  PULSES: 2+ radial & pedal pulses, symmetrical in all extremities.  NEUROLOGIC: PERRL, and reacts briskly to light. Motor strength 5/5 all extremities.  The pt's facial expression is symmetrical, patient moving all extremities, normal sensation in all extremities when touched with a finger.The patient is awake, alert and cooperative with a calm affect, patient is aware of environment.      Will continue to monitor

## 2019-06-10 NOTE — ED PROVIDER NOTES
Encounter Date: 6/9/2019       History     Chief Complaint   Patient presents with    Fall     slip and fall. with hematoma to L eyebrow.      Patient is a 89-year-old female with aortic stenosis, hypertension and hyperlipidemia who presents to the ED with a head injury that occurred 1 hr prior to arrival. Patient states she was leaning far over to grab something on the night stand and fell and hit the left side of her face.  She states she did not lose consciousness.  She states she did not have any preceding symptoms such as dizziness or chest pain. She lives with her daughter and she assisted her standing up.  Patient has no complaints. She denies headache, vision changes, nausea, or emesis.     The history is provided by the patient.     Review of patient's allergies indicates:  No Known Allergies  Past Medical History:   Diagnosis Date    High cholesterol     Hyperlipidemia     Hypertension     Syncope     Syncope and collapse      Past Surgical History:   Procedure Laterality Date    MYRINGOTOMY Right 3/2/2016    Performed by Teja Bianchi MD at [facility] OR     History reviewed. No pertinent family history.  Social History     Tobacco Use    Smoking status: Never Smoker    Smokeless tobacco: Never Used   Substance Use Topics    Alcohol use: No    Drug use: No     Review of Systems   Constitutional: Negative for chills and fever.   Eyes: Positive for visual disturbance. Negative for pain.   Cardiovascular: Negative for chest pain.   Gastrointestinal: Negative for nausea and vomiting.   Musculoskeletal: Negative for arthralgias, back pain and neck pain.   Skin: Positive for wound (Hematoma to forehead). Negative for rash.   Neurological: Negative for dizziness, weakness, light-headedness and headaches.       Physical Exam     Initial Vitals [06/09/19 2023]   BP Pulse Resp Temp SpO2   (!) 175/81 74 16 98.8 °F (37.1 °C) 96 %      MAP       --         Physical Exam    Vitals  reviewed.  Constitutional: She is cooperative. No distress.   HENT:   Head: Normocephalic and atraumatic.   No Medina sign.  No hemotympanum.  No facial deformity or tenderness.   Eyes: EOM are normal. Pupils are equal, round, and reactive to light.   No raccoon eyes.   Neck: Normal range of motion. Neck supple.   Cardiovascular: Normal rate, regular rhythm and intact distal pulses.   Murmur (Harsh systolic) heard.  Pulmonary/Chest: Breath sounds normal. She has no wheezes. She has no rhonchi. She has no rales.   Musculoskeletal:   Patient moving all extremities without difficulty.  No bony tenderness or deformities.  No CTL midline tenderness, crepitus, masses, step-offs or deformities   Neurological: She is alert and oriented to person, place, and time. GCS eye subscore is 4. GCS verbal subscore is 5. GCS motor subscore is 6.   Strength 5/5 to all extremities   Skin: Skin is warm and dry. No rash noted.   2 cm hematoma noted superior to the left eyebrow.  No lacerations.         ED Course   Procedures  Labs Reviewed - No data to display       Imaging Results          CT Maxillofacial Without Contrast (Final result)  Result time 06/09/19 21:41:33    Final result by Kym Downs MD (06/09/19 21:41:33)                 Impression:      No acute intracranial abnormalities identified.    No acute facial fractures identified.      Electronically signed by: Kym Downs MD  Date:    06/09/2019  Time:    21:41             Narrative:    EXAMINATION:  CT HEAD WITHOUT CONTRAST; CT MAXILLOFACIAL WITHOUT CONTRAST    CLINICAL HISTORY:  Maxface trauma blunt;    TECHNIQUE:  Low dose axial images were obtained through the head and maxillofacial region.  Coronal and sagittal reformations were also performed. Contrast was not administered.    COMPARISON:  None.    FINDINGS:  There is generalized cerebral volume loss with chronic microvascular ischemic disease.  No evidence acute/recent major vascular distribution cerebral  infarction, intraparenchymal hemorrhage, or intra-axial space occupying lesion. The ventricular system is normal in size and configuration with no evidence of hydrocephalus. No effacement of the skull-base cisterns. No abnormal extra-axial fluid collections or blood products.    No acute facial fractures are identified.  Mild soft tissue swelling is seen left periorbital/frontal scalp region.  Visualized paranasal sinuses and mastoid air cells are clear.  Orbits show no significant abnormalities.  Visualized upper cervical spine shows multilevel DJD with no acute fracture or dislocation seen.  The calvarium shows no significant abnormality.                               CT Head Without Contrast (Final result)  Result time 06/09/19 21:41:33    Final result by Kym Downs MD (06/09/19 21:41:33)                 Impression:      No acute intracranial abnormalities identified.    No acute facial fractures identified.      Electronically signed by: Kym Downs MD  Date:    06/09/2019  Time:    21:41             Narrative:    EXAMINATION:  CT HEAD WITHOUT CONTRAST; CT MAXILLOFACIAL WITHOUT CONTRAST    CLINICAL HISTORY:  Maxface trauma blunt;    TECHNIQUE:  Low dose axial images were obtained through the head and maxillofacial region.  Coronal and sagittal reformations were also performed. Contrast was not administered.    COMPARISON:  None.    FINDINGS:  There is generalized cerebral volume loss with chronic microvascular ischemic disease.  No evidence acute/recent major vascular distribution cerebral infarction, intraparenchymal hemorrhage, or intra-axial space occupying lesion. The ventricular system is normal in size and configuration with no evidence of hydrocephalus. No effacement of the skull-base cisterns. No abnormal extra-axial fluid collections or blood products.    No acute facial fractures are identified.  Mild soft tissue swelling is seen left periorbital/frontal scalp region.  Visualized paranasal  sinuses and mastoid air cells are clear.  Orbits show no significant abnormalities.  Visualized upper cervical spine shows multilevel DJD with no acute fracture or dislocation seen.  The calvarium shows no significant abnormality.                                 Medical Decision Making:   Initial Assessment:   Urgent evaluation of a 89 y.o. female presenting to the emergency department complaining of fall after reaching to follow are while lying on the bed. Patient is afebrile, nontoxic appearing and hemodynamically stable.  Patient does have history of severe aortic stenosis in syncopal episodes but patient did not have a syncopal episode today.  She lives with daughter who was nearby when she hit her head.  Patient had no LOC.  He is asymptomatic here today.  On exam.  She has 2 cm hematoma above left eyebrow.  No other signs of head trauma.  Given patient's age and that she is on blood thinners, will obtain imaging of face and head.  ED Management:  CT max face and CT head reveals no acute intracranial abnormalities or facial fractures.  Patient was given ice here in the ED.  She is advised on symptomatic care.  Daughter and patient were given strict return precautions.  Patient has no other complaints today and is stable for discharge.                      Clinical Impression:     1. Traumatic hematoma of forehead, initial encounter                               Britton Soriano PA-C  06/09/19 7410

## 2019-06-14 ENCOUNTER — OFFICE VISIT (OUTPATIENT)
Dept: URGENT CARE | Facility: CLINIC | Age: 84
End: 2019-06-14
Payer: MEDICARE

## 2019-06-14 VITALS
WEIGHT: 120 LBS | OXYGEN SATURATION: 100 % | SYSTOLIC BLOOD PRESSURE: 146 MMHG | DIASTOLIC BLOOD PRESSURE: 78 MMHG | TEMPERATURE: 98 F | HEART RATE: 82 BPM | HEIGHT: 67 IN | RESPIRATION RATE: 20 BRPM | BODY MASS INDEX: 18.83 KG/M2
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
## 2019-06-14 DIAGNOSIS — S70.02XA HEMATOMA OF LEFT HIP, INITIAL ENCOUNTER: Primary | ICD-10-CM

## 2019-06-14 DIAGNOSIS — I10 HYPERTENSION, UNSPECIFIED TYPE: ICD-10-CM

## 2019-06-14 PROCEDURE — 99214 PR OFFICE/OUTPT VISIT, EST, LEVL IV, 30-39 MIN: ICD-10-PCS | Mod: S$GLB,,, | Performed by: NURSE PRACTITIONER

## 2019-06-14 PROCEDURE — 73502 X-RAY EXAM HIP UNI 2-3 VIEWS: CPT | Mod: LT,S$GLB,, | Performed by: RADIOLOGY

## 2019-06-14 PROCEDURE — 73502 XR HIP 2 VIEW LEFT: ICD-10-PCS | Mod: LT,S$GLB,, | Performed by: RADIOLOGY

## 2019-06-14 PROCEDURE — 99214 OFFICE O/P EST MOD 30 MIN: CPT | Mod: S$GLB,,, | Performed by: NURSE PRACTITIONER

## 2019-06-14 NOTE — PROGRESS NOTES
"Subjective:       Patient ID: Ignacio Oakes is a 89 y.o. female.    Vitals:  height is 5' 7" (1.702 m) and weight is 54.4 kg (120 lb). Her temperature is 98.4 °F (36.9 °C). Her blood pressure is 146/78 (abnormal) and her pulse is 82. Her respiration is 20 and oxygen saturation is 100%.     Chief Complaint: Mass    Bruise  And mass on left thigh after a fall on 6/9/2019.  Patient was seen in the ED at the time of the fall and a CT scan was performed.  Caretaker says they did not notice that her left leg was injured until after they left.  States she spoke with the physician who took care of her in the ED and they recommended waiting a few days and then following up in Urgent Care if it did not resolve.    Mass   This is a new problem. The current episode started in the past 7 days. The problem occurs intermittently. The problem has been unchanged. Associated symptoms include arthralgias. Pertinent negatives include no chest pain, chills, congestion, coughing, fatigue, fever, headaches, joint swelling, myalgias, nausea, rash, sore throat, vertigo, vomiting or weakness. Nothing aggravates the symptoms. She has tried acetaminophen for the symptoms. The treatment provided mild relief.       Constitution: Negative for chills, fatigue and fever.   HENT: Negative for congestion and sore throat.    Neck: Negative for painful lymph nodes.   Cardiovascular: Negative for chest pain and leg swelling.   Eyes: Negative for double vision and blurred vision.   Respiratory: Negative for cough and shortness of breath.    Gastrointestinal: Negative for nausea, vomiting and diarrhea.   Genitourinary: Negative for dysuria, frequency, urgency and history of kidney stones.   Musculoskeletal: Positive for joint pain. Negative for joint swelling, muscle cramps and muscle ache.   Skin: Positive for bruising. Negative for color change, pale and rash.   Allergic/Immunologic: Negative for seasonal allergies.   Neurological: Negative for " dizziness, history of vertigo, light-headedness, passing out and headaches.   Hematologic/Lymphatic: Negative for swollen lymph nodes.   Psychiatric/Behavioral: Negative for nervous/anxious, sleep disturbance and depression. The patient is not nervous/anxious.        Objective:      Physical Exam   Constitutional: She is oriented to person, place, and time. She appears well-developed and well-nourished. She is cooperative.  Non-toxic appearance. She does not appear ill. No distress.   Hypertensive in clinic   HENT:   Head: Normocephalic and atraumatic. Head is without abrasion, without contusion and without laceration.   Right Ear: Hearing, tympanic membrane, external ear and ear canal normal. No hemotympanum.   Left Ear: Hearing, tympanic membrane, external ear and ear canal normal. No hemotympanum.   Nose: Nose normal. No mucosal edema, rhinorrhea or nasal deformity. No epistaxis. Right sinus exhibits no maxillary sinus tenderness and no frontal sinus tenderness. Left sinus exhibits no maxillary sinus tenderness and no frontal sinus tenderness.   Mouth/Throat: Uvula is midline and mucous membranes are normal. No trismus in the jaw. Normal dentition. No uvula swelling. No posterior oropharyngeal erythema.   Eyes: Pupils are equal, round, and reactive to light. Conjunctivae, EOM and lids are normal. Right eye exhibits no discharge. Left eye exhibits no discharge. No scleral icterus.   Sclera clear bilat   Neck: Trachea normal, full passive range of motion without pain and phonation normal. Neck supple. No spinous process tenderness and no muscular tenderness present. No neck rigidity. No tracheal deviation present.   Cardiovascular: Normal rate, intact distal pulses and normal pulses.   Pulses:       Dorsalis pedis pulses are 2+ on the left side.        Posterior tibial pulses are 2+ on the left side.   Pulmonary/Chest: Effort normal. No respiratory distress.   Abdominal: Normal appearance. She exhibits no pulsatile  midline mass.   Musculoskeletal: Normal range of motion. She exhibits tenderness. She exhibits no edema or deformity.        Left hip: She exhibits tenderness, bony tenderness and swelling.        Left knee: Normal.        Legs:  Neurological: She is alert and oriented to person, place, and time. She has normal strength. No cranial nerve deficit or sensory deficit. She exhibits normal muscle tone. She displays no seizure activity. Coordination normal. GCS eye subscore is 4. GCS verbal subscore is 5. GCS motor subscore is 6.   Skin: Skin is warm, dry and intact. Capillary refill takes less than 2 seconds. Bruising noted. No abrasion, no burn, no ecchymosis and no laceration noted. She is not diaphoretic. No pallor.        Psychiatric: She has a normal mood and affect. Her speech is normal and behavior is normal. Judgment and thought content normal. Cognition and memory are normal.   Nursing note and vitals reviewed.    X-ray Hip 2 Or 3 Views Left    Result Date: 6/14/2019  EXAMINATION: XR HIP 2 VIEW LEFT CLINICAL HISTORY: Unspecified fall, initial encounter TECHNIQUE: AP view of the pelvis and frog leg lateral view of the left hip were performed. COMPARISON: None FINDINGS: Mild DJD.  No fracture or dislocation.  No bone destruction identified.  Pelvic calcification probably related to a calcified uterine fibroid.  Vascular calcification also noted.     See above Electronically signed by: Jaime Penny MD Date:    06/14/2019 Time:    13:45      Assessment:       1. Hematoma of left hip, initial encounter    2. Fall, initial encounter    3. Hypertension, unspecified type        Plan:       Had a detailed discussion with pts daughter about results of xray, advised to give tylenol for pain and discomfort. Advised to f/u with ortho if symptoms persist. Voiced understanding.  Hematoma of left hip, initial encounter  -     X-Ray Hip 2 or 3 views Left; Future; Expected date: 06/14/2019  -     Ambulatory referral to  Orthopedics    Fall, initial encounter  -     Cancel: XR PELVIS COMPLETE MIN 3 VIEWS; Future; Expected date: 06/14/2019  -     X-Ray Hip 2 or 3 views Left; Future; Expected date: 06/14/2019  -     Ambulatory referral to Orthopedics    Hypertension, unspecified type          Patient Instructions     If your condition worsens or fails to improve we recommend that you receive another evaluation at the emergency room immediately or contact your primary medical clinic to discuss your concerns.   You must understand that you have received an Urgent Care treatment only and that you may be released before all of your medical problems are known or treated. You, the patient, will arrange for follow up care as instructed.   Please drink plenty of fluids.  Please get plenty of rest.  Please return here or go to the Emergency Department for any concerns or worsening of condition.  If you were prescribed a narcotic medication, do not drive or operate heavy equipment or machinery while taking these medications.  If you were not prescribed an anti-inflammatory medication, and if you do not have any history of stomach/intestinal ulcers, or kidney disease, or are not taking a blood thinner such as Coumadin, Plavix, Pradaxa, Eloquis, or Xaralta for example, it is OK to take over the counter Ibuprofen or Advil or Motrin or Aleve as directed.  Do not take these medications on an empty stomach.  Rest, ice, compression and elevation to the affected joint or limb as needed.  Please follow up with your primary care doctor or specialist as needed.    If you  smoke, please stop smoking.    Soft Tissue Contusion  You have a contusion. This is also called a bruise. There is swelling and some bleeding under the skin. This injury generally takes a few days to a few weeks to heal.  During that time, the bruise will typically change in color from reddish, to purple-blue, to greenish-yellow, then to yellow-brown.  Home care  · Elevate the injured  area to reduce pain and swelling. As much as possible, sit or lie down with the injured area raised about the level of your heart. This is especially important during the first 48 hours.  · Ice the injured area to help reduce pain and swelling. Wrap a cold source (ice pack or ice cubes in a plastic bag) in a thin towel. Apply to the bruised area for 20 minutes every 1 to 2 hours the first day. Continue this 3 to 4 times a day until the pain and swelling goes away.  · Unless another medication was prescribed, you can take acetaminophen, ibuprofen, or naproxen to control pain. (If you have chronic liver or kidney disease or ever had a stomach ulcer or GI bleeding, talk with your doctor before using these medicines.)  Follow up  Follow up with your health care provider or our staff as advised. Call if you are not better in 1 to 2 weeks.  When to seek medical advice   Call your health care provider right away if you have any of the following:  · Increased pain or swelling  · Bruise is on an arm or leg and arm or leg becomes cold, blue, numb or tingly  · Signs of infection: Warmth, drainage, or increased redness or pain around the contusion  · Inability to move the injured area or body part   · Bruise is near your eye and you have problems with your eyesight or eye   · Frequent bruising for unknown reasons  Date Last Reviewed: 4/29/2015  © 4947-5677 [employer]. [address] [place] [address]. All rights reserved. This information is not intended as a substitute for professional medical care. Always follow your healthcare professional's instructions.

## 2019-06-14 NOTE — PATIENT INSTRUCTIONS
If your condition worsens or fails to improve we recommend that you receive another evaluation at the emergency room immediately or contact your primary medical clinic to discuss your concerns.   You must understand that you have received an Urgent Care treatment only and that you may be released before all of your medical problems are known or treated. You, the patient, will arrange for follow up care as instructed.   Please drink plenty of fluids.  Please get plenty of rest.  Please return here or go to the Emergency Department for any concerns or worsening of condition.  If you were prescribed a narcotic medication, do not drive or operate heavy equipment or machinery while taking these medications.  If you were not prescribed an anti-inflammatory medication, and if you do not have any history of stomach/intestinal ulcers, or kidney disease, or are not taking a blood thinner such as Coumadin, Plavix, Pradaxa, Eloquis, or Xaralta for example, it is OK to take over the counter Ibuprofen or Advil or Motrin or Aleve as directed.  Do not take these medications on an empty stomach.  Rest, ice, compression and elevation to the affected joint or limb as needed.  Please follow up with your primary care doctor or specialist as needed.    If you  smoke, please stop smoking.    Soft Tissue Contusion  You have a contusion. This is also called a bruise. There is swelling and some bleeding under the skin. This injury generally takes a few days to a few weeks to heal.  During that time, the bruise will typically change in color from reddish, to purple-blue, to greenish-yellow, then to yellow-brown.  Home care  · Elevate the injured area to reduce pain and swelling. As much as possible, sit or lie down with the injured area raised about the level of your heart. This is especially important during the first 48 hours.  · Ice the injured area to help reduce pain and swelling. Wrap a cold source (ice pack or ice cubes in a plastic  bag) in a thin towel. Apply to the bruised area for 20 minutes every 1 to 2 hours the first day. Continue this 3 to 4 times a day until the pain and swelling goes away.  · Unless another medication was prescribed, you can take acetaminophen, ibuprofen, or naproxen to control pain. (If you have chronic liver or kidney disease or ever had a stomach ulcer or GI bleeding, talk with your doctor before using these medicines.)  Follow up  Follow up with your health care provider or our staff as advised. Call if you are not better in 1 to 2 weeks.  When to seek medical advice   Call your health care provider right away if you have any of the following:  · Increased pain or swelling  · Bruise is on an arm or leg and arm or leg becomes cold, blue, numb or tingly  · Signs of infection: Warmth, drainage, or increased redness or pain around the contusion  · Inability to move the injured area or body part   · Bruise is near your eye and you have problems with your eyesight or eye   · Frequent bruising for unknown reasons  Date Last Reviewed: 4/29/2015 © 2000-2017 [employer]. [address] [place] [address]. All rights reserved. This information is not intended as a substitute for professional medical care. Always follow your healthcare professional's instructions.

## 2019-06-24 ENCOUNTER — HOSPITAL ENCOUNTER (OUTPATIENT)
Dept: RADIOLOGY | Facility: HOSPITAL | Age: 84
Discharge: HOME | End: 2019-06-24
Attending: NURSE PRACTITIONER
Payer: MEDICARE

## 2019-06-24 ENCOUNTER — OFFICE VISIT (OUTPATIENT)
Dept: ORTHOPEDICS | Facility: CLINIC | Age: 84
End: 2019-06-24
Payer: MEDICARE

## 2019-06-24 VITALS
DIASTOLIC BLOOD PRESSURE: 62 MMHG | HEART RATE: 69 BPM | HEIGHT: 67 IN | BODY MASS INDEX: 18.82 KG/M2 | WEIGHT: 119.94 LBS | SYSTOLIC BLOOD PRESSURE: 113 MMHG

## 2019-06-24 DIAGNOSIS — M25.552 LEFT HIP PAIN: ICD-10-CM

## 2019-06-24 DIAGNOSIS — S70.02XA HEMATOMA OF LEFT HIP, INITIAL ENCOUNTER: Primary | ICD-10-CM

## 2019-06-24 PROCEDURE — 1100F PR PT FALLS ASSESS DOC 2+ FALLS/FALL W/INJURY/YR: ICD-10-PCS | Mod: CPTII,S$GLB,, | Performed by: NURSE PRACTITIONER

## 2019-06-24 PROCEDURE — 73502 X-RAY EXAM HIP UNI 2-3 VIEWS: CPT | Mod: TC,LT

## 2019-06-24 PROCEDURE — 99214 OFFICE O/P EST MOD 30 MIN: CPT | Mod: S$GLB,,, | Performed by: NURSE PRACTITIONER

## 2019-06-24 PROCEDURE — 73502 X-RAY EXAM HIP UNI 2-3 VIEWS: CPT | Mod: 26,LT,, | Performed by: RADIOLOGY

## 2019-06-24 PROCEDURE — 99999 PR PBB SHADOW E&M-EST. PATIENT-LVL III: ICD-10-PCS | Mod: PBBFAC,,, | Performed by: NURSE PRACTITIONER

## 2019-06-24 PROCEDURE — 3288F PR FALLS RISK ASSESSMENT DOCUMENTED: ICD-10-PCS | Mod: CPTII,S$GLB,, | Performed by: NURSE PRACTITIONER

## 2019-06-24 PROCEDURE — 73502 XR HIP 2 VIEW LEFT: ICD-10-PCS | Mod: 26,LT,, | Performed by: RADIOLOGY

## 2019-06-24 PROCEDURE — 99999 PR PBB SHADOW E&M-EST. PATIENT-LVL III: CPT | Mod: PBBFAC,,, | Performed by: NURSE PRACTITIONER

## 2019-06-24 PROCEDURE — 1100F PTFALLS ASSESS-DOCD GE2>/YR: CPT | Mod: CPTII,S$GLB,, | Performed by: NURSE PRACTITIONER

## 2019-06-24 PROCEDURE — 3288F FALL RISK ASSESSMENT DOCD: CPT | Mod: CPTII,S$GLB,, | Performed by: NURSE PRACTITIONER

## 2019-06-24 PROCEDURE — 99214 PR OFFICE/OUTPT VISIT, EST, LEVL IV, 30-39 MIN: ICD-10-PCS | Mod: S$GLB,,, | Performed by: NURSE PRACTITIONER

## 2019-06-24 NOTE — LETTER
June 24, 2019      Mildred Martínez, IVETTE  [address] [facility] [address]           [facility] - Orthopedics  [address], [address]  [facility] [address]  Phone: [phone]          Patient: Ignacio Oakes   MR Number: [id number]   YOB: 1925   Date of Visit: 6/24/2019       Dear Mildred Martínez:    Thank you for referring Ignacio Oakes to me for evaluation. Attached you will find relevant portions of my assessment and plan of care.    If you have questions, please do not hesitate to call me. I look forward to following Ignacio Oakes along with you.    Sincerely,    Caleb Loo NP    Enclosure  CC:  No Recipients    If you would like to receive this communication electronically, please contact [email or web address] or [phone] to request more information on [employer] Link access.    For providers and/or their staff who would like to refer a patient to [facility], please contact us through our one-stop-shop provider referral line, [facility], at [phone].    If you feel you have received this communication in error or would no longer like to receive these types of communications, please e-mail [email or web address]

## 2019-06-24 NOTE — PROGRESS NOTES
SUBJECTIVE:     Chief Complaint & History of Present Illness:  Ignacio Oakes is a New 89 y.o. year old female patient presenting today for intermittent left hip pain which started 2 weeks ago.  There is a history of trauma.  She reports she fell out of her bed when reaching for a piece of candy.  She went to the ED and diagnosed with a hematoma of the hip.  She was then discharged home but when the swelling did not improve, she went to urgent care.  There too they diagnosed her with a hematoma.  Today, she reports she does not have pain.  Her daughters, who are with her today, are concerned about an large lump to her left hip that has not improved.  Previous treatments include ice and acetaminophen which have provided good relief.  There is not a history of previous injury or surgery to the hip.  The patient does use an assistive device.      Past Medical History:   Diagnosis Date    High cholesterol     Hyperlipidemia     Hypertension     Syncope     Syncope and collapse        Past Surgical History:   Procedure Laterality Date    MYRINGOTOMY Right 3/2/2016    Performed by Teja Bianchi MD at [facility] OR       No family history on file.    Review of patient's allergies indicates:  No Known Allergies      Current Outpatient Medications:     amlodipine-benazepril 10-20mg (LOTREL) 10-20 mg per capsule, Take 1 capsule by mouth once daily., Disp: , Rfl:     aspirin (ECOTRIN) 81 MG EC tablet, Take 81 mg by mouth once daily., Disp: , Rfl:     metoprolol succinate (TOPROL XL) 50 MG 24 hr tablet, Take 50 mg by mouth once daily., Disp: , Rfl:     multivit-mineral-iron-lutein Tab, Take by mouth., Disp: , Rfl:     omeprazole (PRILOSEC) 20 MG capsule, TK 1 C PO QD, Disp: , Rfl: 1    rosuvastatin (CRESTOR) 5 MG tablet, Take 5 mg by mouth once daily., Disp: , Rfl:     Review of Systems:  ROS:  Constitutional: no fever or chills  Eyes: no visual changes  ENT: no nasal congestion or sore throat  Respiratory:  "no cough or shortness of breath  Cardiovascular: no chest pain or palpitations  Gastrointestinal: no nausea or vomiting, tolerating diet  Genitourinary: no hematuria or dysuria  Integument/Breast: no rash or pruritis  Hematologic/Lymphatic: no easy bruising or lymphadenopathy  Musculoskeletal: no arthralgias or myalgias  Neurological: no seizures or tremors  Behavioral/Psych: no auditory or visual hallucinations  Endocrine: no heat or cold intolerance      PE:  /62   Pulse 69   Ht 5' 7" (1.702 m)   Wt 54.4 kg (119 lb 14.9 oz)   LMP  (LMP Unknown)   BMI 18.78 kg/m²   General: Pleasant, cooperative, NAD   HEENT: NCAT, sclera nonicteric   Lungs: Respirations are equal and unlabored.   Abdomen: Soft and non-tender.  CV: 2+ bilateral upper and lower extremity pulses.   Skin: Intact throughout LE with no rashes, erythema, or lesions  Extremities: No LE edema, NVI lower extremities      Hip Exam:   leftfull painless range of motion, without tenderness    100 degrees flexion  0 degrees extension   30 degrees internal rotation  30 degrees external rotation        RADIOGRAPHS:  X-ray of left hip obtained today, personally reviewed by me shows no fracture or dislocation about the hip.  There is some soft tissue swelling to lateral hip consistent with previous diagnosis of hematoma.    ASSESSMENT/PLAN:       ICD-10-CM ICD-9-CM   1. Hematoma of left hip, initial encounter S70.02XA 924.01       Plan: We discussed with the patient at length all the different treatment options available for arthrosis of the hip including anti-inflammatories, acetaminophen, rest, ice, lower extremity strengthening exercise, occasional cortisone injections for temporary relief, and finally total hip arthroplasty.     -Patient presents for lump to left hip s/p fall 2 weeks ago.  -X-ray negative for fracture or dislocation.  -Recommend Tylenol PRN for pain.  -May use heat and alternate with ice PRN to assist with absorption of blood " collection.  -Follow up in clinic as needed for new or worsening problems.

## 2019-07-31 DIAGNOSIS — I35.0 NODULAR CALCIFIC AORTIC VALVE STENOSIS: Primary | ICD-10-CM

## 2019-09-18 ENCOUNTER — HOSPITAL ENCOUNTER (EMERGENCY)
Facility: OTHER | Age: 84
Discharge: HOME | End: 2019-09-18
Attending: EMERGENCY MEDICINE
Payer: MEDICARE

## 2019-09-18 VITALS
WEIGHT: 119 LBS | BODY MASS INDEX: 19.13 KG/M2 | HEIGHT: 66 IN | TEMPERATURE: 98 F | OXYGEN SATURATION: 98 % | HEART RATE: 76 BPM | DIASTOLIC BLOOD PRESSURE: 65 MMHG | SYSTOLIC BLOOD PRESSURE: 140 MMHG | RESPIRATION RATE: 18 BRPM

## 2019-09-18 DIAGNOSIS — R55 SYNCOPE, UNSPECIFIED SYNCOPE TYPE: Primary | ICD-10-CM

## 2019-09-18 DIAGNOSIS — R41.82 ALTERED MENTAL STATUS: ICD-10-CM

## 2019-09-18 DIAGNOSIS — N30.00 ACUTE CYSTITIS WITHOUT HEMATURIA: ICD-10-CM

## 2019-09-18 LAB
ANION GAP SERPL CALC-SCNC: 9 MMOL/L (ref 8–16)
BACTERIA #/AREA URNS HPF: ABNORMAL /HPF
BASOPHILS # BLD AUTO: 0.05 K/UL (ref 0–0.2)
BASOPHILS NFR BLD: 0.8 % (ref 0–1.9)
BILIRUB UR QL STRIP: NEGATIVE
BUN SERPL-MCNC: 21 MG/DL (ref 10–30)
CALCIUM SERPL-MCNC: 8.7 MG/DL (ref 8.7–10.5)
CHLORIDE SERPL-SCNC: 109 MMOL/L (ref 95–110)
CLARITY UR: ABNORMAL
CO2 SERPL-SCNC: 24 MMOL/L (ref 23–29)
COLOR UR: YELLOW
CREAT SERPL-MCNC: 2 MG/DL (ref 0.5–1.4)
DIFFERENTIAL METHOD: ABNORMAL
EOSINOPHIL # BLD AUTO: 0.1 K/UL (ref 0–0.5)
EOSINOPHIL NFR BLD: 1.4 % (ref 0–8)
ERYTHROCYTE [DISTWIDTH] IN BLOOD BY AUTOMATED COUNT: 14.3 % (ref 11.5–14.5)
EST. GFR  (AFRICAN AMERICAN): 24 ML/MIN/1.73 M^2
EST. GFR  (NON AFRICAN AMERICAN): 21 ML/MIN/1.73 M^2
GLUCOSE SERPL-MCNC: 103 MG/DL (ref 70–110)
GLUCOSE UR QL STRIP: NEGATIVE
HCT VFR BLD AUTO: 29.9 % (ref 37–48.5)
HGB BLD-MCNC: 9.3 G/DL (ref 12–16)
HGB UR QL STRIP: ABNORMAL
IMM GRANULOCYTES # BLD AUTO: 0.03 K/UL (ref 0–0.04)
IMM GRANULOCYTES NFR BLD AUTO: 0.5 % (ref 0–0.5)
KETONES UR QL STRIP: NEGATIVE
LEUKOCYTE ESTERASE UR QL STRIP: ABNORMAL
LYMPHOCYTES # BLD AUTO: 1.2 K/UL (ref 1–4.8)
LYMPHOCYTES NFR BLD: 19.3 % (ref 18–48)
MCH RBC QN AUTO: 27.1 PG (ref 27–31)
MCHC RBC AUTO-ENTMCNC: 31.1 G/DL (ref 32–36)
MCV RBC AUTO: 87 FL (ref 82–98)
MICROSCOPIC COMMENT: ABNORMAL
MONOCYTES # BLD AUTO: 0.4 K/UL (ref 0.3–1)
MONOCYTES NFR BLD: 7 % (ref 4–15)
NEUTROPHILS # BLD AUTO: 4.5 K/UL (ref 1.8–7.7)
NEUTROPHILS NFR BLD: 71 % (ref 38–73)
NITRITE UR QL STRIP: POSITIVE
NRBC BLD-RTO: 0 /100 WBC
PH UR STRIP: 6 [PH] (ref 5–8)
PLATELET # BLD AUTO: 219 K/UL (ref 150–350)
PMV BLD AUTO: 10.6 FL (ref 9.2–12.9)
POTASSIUM SERPL-SCNC: 3.8 MMOL/L (ref 3.5–5.1)
PROT UR QL STRIP: ABNORMAL
RBC # BLD AUTO: 3.43 M/UL (ref 4–5.4)
RBC #/AREA URNS HPF: 1 /HPF (ref 0–4)
SODIUM SERPL-SCNC: 142 MMOL/L (ref 136–145)
SP GR UR STRIP: 1.01 (ref 1–1.03)
SQUAMOUS #/AREA URNS HPF: ABNORMAL /HPF
TROPONIN I SERPL DL<=0.01 NG/ML-MCNC: 0.02 NG/ML (ref 0–0.03)
URN SPEC COLLECT METH UR: ABNORMAL
UROBILINOGEN UR STRIP-ACNC: NEGATIVE EU/DL
WBC # BLD AUTO: 6.28 K/UL (ref 3.9–12.7)
WBC #/AREA URNS HPF: >100 /HPF (ref 0–5)
WBC CLUMPS URNS QL MICRO: ABNORMAL

## 2019-09-18 PROCEDURE — 84484 ASSAY OF TROPONIN QUANT: CPT

## 2019-09-18 PROCEDURE — 93010 EKG 12-LEAD: ICD-10-PCS | Mod: ,,, | Performed by: INTERNAL MEDICINE

## 2019-09-18 PROCEDURE — 63600175 PHARM REV CODE 636 W HCPCS: Performed by: EMERGENCY MEDICINE

## 2019-09-18 PROCEDURE — 93005 ELECTROCARDIOGRAM TRACING: CPT

## 2019-09-18 PROCEDURE — 96365 THER/PROPH/DIAG IV INF INIT: CPT

## 2019-09-18 PROCEDURE — 85025 COMPLETE CBC W/AUTO DIFF WBC: CPT

## 2019-09-18 PROCEDURE — 87147 CULTURE TYPE IMMUNOLOGIC: CPT

## 2019-09-18 PROCEDURE — 80048 BASIC METABOLIC PNL TOTAL CA: CPT

## 2019-09-18 PROCEDURE — 81000 URINALYSIS NONAUTO W/SCOPE: CPT

## 2019-09-18 PROCEDURE — 87088 URINE BACTERIA CULTURE: CPT

## 2019-09-18 PROCEDURE — 87086 URINE CULTURE/COLONY COUNT: CPT

## 2019-09-18 PROCEDURE — 99285 EMERGENCY DEPT VISIT HI MDM: CPT | Mod: 25

## 2019-09-18 PROCEDURE — 93010 ELECTROCARDIOGRAM REPORT: CPT | Mod: ,,, | Performed by: INTERNAL MEDICINE

## 2019-09-18 RX ORDER — CEPHALEXIN 250 MG/1
500 CAPSULE ORAL 2 TIMES DAILY
Qty: 20 CAPSULE | Refills: 0 | Status: SHIPPED | OUTPATIENT
Start: 2019-09-18 | End: 2019-09-23

## 2019-09-18 RX ADMIN — CEFTRIAXONE 1 G: 1 INJECTION, SOLUTION INTRAVENOUS at 10:09

## 2019-09-19 NOTE — ED PROVIDER NOTES
"Encounter Date: 9/18/2019       History     Chief Complaint   Patient presents with    Altered Mental Status     pt came to the ed tonight s/p sitting down in chair and family states she is not acting appropriately for her normal self.      Time seen by provider: 7:30 PM    This is a 89 y.o. female who presents via EMS with complaint of AMS PTA. Per relative who discussed with EMS, pt stood up and sat down and began "acting inappropriately". She subsequently became unresponsive and her "cheeks started chattering". Per EMS she was alert and oriented to person, place and situation but not to time. She is now responsive and alert and oriented x4 . She denies chest pain, and extremity weakness.     8:30 p.m. updated H&P.  The patient's daughter is at bedside.  She states these syncopal episodes have been going on a couple of times a year over the last few years.  They are however increasing in frequency.  She has been evaluated by Cardiology for this and they suspect that is due to her aortic stenosis.    The history is provided by the patient, the EMS personnel and a relative.     Review of patient's allergies indicates:  No Known Allergies  Past Medical History:   Diagnosis Date    High cholesterol     Hyperlipidemia     Hypertension     Syncope     Syncope and collapse      Past Surgical History:   Procedure Laterality Date    MYRINGOTOMY Right 3/2/2016    Performed by Teja Bianchi MD at [facility] OR     No family history on file.  Social History     Tobacco Use    Smoking status: Never Smoker    Smokeless tobacco: Never Used   Substance Use Topics    Alcohol use: No    Drug use: No     Review of Systems   Constitutional: Negative for chills and diaphoresis.   HENT: Negative for congestion.    Eyes: Negative for discharge.   Respiratory: Negative for shortness of breath.    Cardiovascular: Negative for chest pain.   Genitourinary: Negative for dysuria.   Musculoskeletal: Negative for back pain. "   Neurological: Negative for dizziness and weakness.        Positive for unresponsiveness (now resolved)   Hematological: Does not bruise/bleed easily.   All other systems reviewed and are negative.      Physical Exam     Initial Vitals   BP Pulse Resp Temp SpO2   09/18/19 1920 09/18/19 1920 09/18/19 2005 09/18/19 1920 09/18/19 1920   127/64 69 17 98.4 °F (36.9 °C) 99 %      MAP       --                Physical Exam    Nursing note and vitals reviewed.  Constitutional: She appears well-developed and well-nourished. She is not diaphoretic. No distress.   HENT:   Head: Normocephalic and atraumatic.   Right Ear: External ear normal.   Left Ear: External ear normal.   Eyes: Conjunctivae and EOM are normal. Pupils are equal, round, and reactive to light.   Neck: Normal range of motion. Neck supple.   Cardiovascular: Normal rate, regular rhythm and normal heart sounds. Exam reveals no gallop and no friction rub.    Pulmonary/Chest: No respiratory distress. She has no wheezes. She has no rhonchi. She has no rales.   Systolic murmur at R second intercostal space.    Abdominal: Soft. Bowel sounds are normal. She exhibits no distension. There is no tenderness. There is no rebound and no guarding.   Genitourinary: Rectal exam shows guaiac negative stool. Guaiac negative stool.   Musculoskeletal: Normal range of motion. She exhibits no edema or tenderness.   Neurological: She is alert and oriented to person, place, and time. She has normal strength and normal reflexes. She displays normal reflexes. No cranial nerve deficit or sensory deficit. GCS score is 15. GCS eye subscore is 4. GCS verbal subscore is 5. GCS motor subscore is 6.   Skin: Skin is warm and dry.   Psychiatric: She has a normal mood and affect. Her behavior is normal. Judgment and thought content normal.         ED Course   Procedures  Labs Reviewed   CBC W/ AUTO DIFFERENTIAL - Abnormal; Notable for the following components:       Result Value    RBC 3.43 (*)      Hemoglobin 9.3 (*)     Hematocrit 29.9 (*)     Mean Corpuscular Hemoglobin Conc 31.1 (*)     All other components within normal limits   BASIC METABOLIC PANEL - Abnormal; Notable for the following components:    Creatinine 2.0 (*)     eGFR if  24 (*)     eGFR if non  21 (*)     All other components within normal limits   TROPONIN I   URINALYSIS, REFLEX TO URINE CULTURE   URINALYSIS MICROSCOPIC     EKG Readings: (Independently Interpreted)   EKG independently interpreted by myself shows normal sinus rhythm at a rate of 66, normal intervals, narrow QRS, no acute ST T wave abnormalities.  Compared to an EKG February 20, 2019 shows no change.       Imaging Results          X-Ray Chest AP Portable (Final result)  Result time 09/18/19 20:26:17    Final result by Mey Montiel MD (09/18/19 20:26:17)                 Impression:      No acute intrathoracic abnormality detected.      Electronically signed by: Mey Montiel  Date:    09/18/2019  Time:    20:26             Narrative:    EXAMINATION:  AP PORTABLE CHEST    CLINICAL HISTORY:  Altered mental status, unspecified    TECHNIQUE:  AP portable chest radiograph was submitted.    COMPARISON:  02/20/2019    FINDINGS:  AP portable chest radiograph demonstrates a cardiac silhouette within normal limits.  Vascular calcification is seen at the aortic knob.  There is tortuosity of the descending thoracic aorta.  Tracheal chondrocalcinosis is present.  There is no focal consolidation, pneumothorax, or pleural effusion.  Again seen is mild linear atelectasis at the left lung base.  Overlying pacer leads are present.                               CT Head Without Contrast (In process)               X-Rays:   Independently Interpreted Readings:   Chest X-Ray: Chest x-ray independently interpreted myself shows normal heart size, no acute infiltrate, no bony abnormalities.  Overall impression no acute disease process.     Medical Decision Making:    History:   Old Medical Records: I decided to obtain old medical records.  Initial Assessment:   89-year-old female presents via EMS for altered mental status.  Differential would include urinary tract infection, intracranial etiology, acute kidney injury. Will get labs, EKG chest x-ray and CT of the brain.  Patient's vital signs are otherwise normal. She is alert and oriented person and place and is at her baseline currently.  No signs of stroke.  She does have a systolic murmur consistent with aortic stenosis.  Independently Interpreted Test(s):   I have ordered and independently interpreted X-rays - see prior notes.  I have ordered and independently interpreted EKG Reading(s) - see prior notes  Clinical Tests:   Lab Tests: Ordered and Reviewed                   ED Course as of Sep 18 2057   Wed Sep 18, 2019   2014 Hemoglobin is 9.3.  Five months ago was 11.   Hemoglobin(!): 9.3 [SM]   2027 BMP reviewed her BMP creatinine is 2.0 and GFR is 24.  Compared to her most recent records this is her baseline.    [SM]   2034 Troponin negative.   Troponin I: 0.019 []   2049 I independently reviewed the CT of the brain which showed no acute intracranial abnormalities.  Pending radiology read.    [SM]   2056 Still pending urinalysis.  Will sign out the results to Dr. Doran.  I have updated the patient as well as her daughter who is at bedside plan will be to discharge the patient home.  She has follow-up appointments with her nephrologist as well as cardiologist tomorrow.    This is the extent to the patients complaints today here in the emergency department.    [SM]      ED Course User Index  [SM] Julian De La Torre DO     Clinical Impression:     1. Syncope, unspecified syncope type    2. Altered mental status                                 Julian De La Torre,   09/18/19 2057

## 2019-09-19 NOTE — DISCHARGE INSTRUCTIONS
Follow up with your regularly scheduled appointments with your nephrologist and cardiologist this week.

## 2019-09-20 LAB — BACTERIA UR CULT: ABNORMAL

## 2019-09-23 ENCOUNTER — HOSPITAL ENCOUNTER (OUTPATIENT)
Dept: CARDIOLOGY | Facility: CLINIC | Age: 84
Discharge: HOME | End: 2019-09-23
Attending: INTERNAL MEDICINE
Payer: MEDICARE

## 2019-09-23 ENCOUNTER — OFFICE VISIT (OUTPATIENT)
Dept: CARDIOLOGY | Facility: CLINIC | Age: 84
End: 2019-09-23
Payer: MEDICARE

## 2019-09-23 VITALS
HEART RATE: 60 BPM | WEIGHT: 119 LBS | HEIGHT: 66 IN | BODY MASS INDEX: 19.13 KG/M2 | SYSTOLIC BLOOD PRESSURE: 98 MMHG | DIASTOLIC BLOOD PRESSURE: 56 MMHG

## 2019-09-23 VITALS
WEIGHT: 112.44 LBS | OXYGEN SATURATION: 99 % | DIASTOLIC BLOOD PRESSURE: 77 MMHG | BODY MASS INDEX: 19.92 KG/M2 | HEART RATE: 62 BPM | SYSTOLIC BLOOD PRESSURE: 172 MMHG | HEIGHT: 63 IN

## 2019-09-23 DIAGNOSIS — I10 ESSENTIAL HYPERTENSION: ICD-10-CM

## 2019-09-23 DIAGNOSIS — I35.0 NODULAR CALCIFIC AORTIC VALVE STENOSIS: ICD-10-CM

## 2019-09-23 DIAGNOSIS — I35.0 NONRHEUMATIC AORTIC VALVE STENOSIS: Primary | ICD-10-CM

## 2019-09-23 DIAGNOSIS — R01.1 HEART MURMUR: ICD-10-CM

## 2019-09-23 DIAGNOSIS — E78.5 HYPERLIPIDEMIA, UNSPECIFIED HYPERLIPIDEMIA TYPE: ICD-10-CM

## 2019-09-23 LAB
ASCENDING AORTA: 3.4 CM
AV INDEX (PROSTH): 0.23
AV MEAN GRADIENT: 26 MMHG
AV PEAK GRADIENT: 43 MMHG
AV VALVE AREA: 0.72 CM2
AV VELOCITY RATIO: 0.21
BSA FOR ECHO PROCEDURE: 1.59 M2
CV ECHO LV RWT: 0.34 CM
DOP CALC AO PEAK VEL: 3.28 M/S
DOP CALC AO VTI: 77.13 CM
DOP CALC LVOT AREA: 3.1 CM2
DOP CALC LVOT DIAMETER: 2 CM
DOP CALC LVOT PEAK VEL: 0.68 M/S
DOP CALC LVOT STROKE VOLUME: 55.33 CM3
DOP CALCLVOT PEAK VEL VTI: 17.62 CM
E WAVE DECELERATION TIME: 232.83 MSEC
E/A RATIO: 0.77
E/E' RATIO: 11.5 M/S
ECHO LV POSTERIOR WALL: 0.65 CM (ref 0.6–1.1)
FRACTIONAL SHORTENING: 31 % (ref 28–44)
INTERVENTRICULAR SEPTUM: 0.93 CM (ref 0.6–1.1)
IVRT: 0.09 MSEC
LA MAJOR: 4.86 CM
LA MINOR: 5.49 CM
LA WIDTH: 4.06 CM
LEFT ATRIUM SIZE: 3.69 CM
LEFT ATRIUM VOLUME INDEX: 40.9 ML/M2
LEFT ATRIUM VOLUME: 65.66 CM3
LEFT INTERNAL DIMENSION IN SYSTOLE: 2.64 CM (ref 2.1–4)
LEFT VENTRICLE DIASTOLIC VOLUME INDEX: 39.08 ML/M2
LEFT VENTRICLE DIASTOLIC VOLUME: 62.69 ML
LEFT VENTRICLE MASS INDEX: 53 G/M2
LEFT VENTRICLE SYSTOLIC VOLUME INDEX: 16 ML/M2
LEFT VENTRICLE SYSTOLIC VOLUME: 25.59 ML
LEFT VENTRICULAR INTERNAL DIMENSION IN DIASTOLE: 3.82 CM (ref 3.5–6)
LEFT VENTRICULAR MASS: 85.23 G
LV LATERAL E/E' RATIO: 9.86 M/S
LV SEPTAL E/E' RATIO: 13.8 M/S
MV PEAK A VEL: 0.9 M/S
MV PEAK E VEL: 0.69 M/S
PISA TR MAX VEL: 2.68 M/S
PULM VEIN S/D RATIO: 1.7
PV PEAK D VEL: 0.4 M/S
PV PEAK S VEL: 0.68 M/S
RA MAJOR: 4.1 CM
RA PRESSURE: 3 MMHG
RA WIDTH: 2.89 CM
RIGHT VENTRICULAR END-DIASTOLIC DIMENSION: 3.2 CM
RV TISSUE DOPPLER FREE WALL SYSTOLIC VELOCITY 1 (APICAL 4 CHAMBER VIEW): 8.22 CM/S
SINUS: 2.95 CM
STJ: 2.36 CM
TDI LATERAL: 0.07 M/S
TDI SEPTAL: 0.05 M/S
TDI: 0.06 M/S
TR MAX PG: 29 MMHG
TRICUSPID ANNULAR PLANE SYSTOLIC EXCURSION: 2.27 CM
TV REST PULMONARY ARTERY PRESSURE: 32 MMHG

## 2019-09-23 PROCEDURE — 99999 PR PBB SHADOW E&M-EST. PATIENT-LVL III: CPT | Mod: PBBFAC,,,

## 2019-09-23 PROCEDURE — 93306 TRANSTHORACIC ECHO (TTE) COMPLETE (CUPID ONLY): ICD-10-PCS | Mod: 26,,, | Performed by: INTERNAL MEDICINE

## 2019-09-23 PROCEDURE — 1100F PR PT FALLS ASSESS DOC 2+ FALLS/FALL W/INJURY/YR: ICD-10-PCS | Mod: CPTII,S$GLB,, | Performed by: INTERNAL MEDICINE

## 2019-09-23 PROCEDURE — 99999 PR PBB SHADOW E&M-EST. PATIENT-LVL III: ICD-10-PCS | Mod: PBBFAC,,,

## 2019-09-23 PROCEDURE — 99214 OFFICE O/P EST MOD 30 MIN: CPT | Mod: S$GLB,,, | Performed by: INTERNAL MEDICINE

## 2019-09-23 PROCEDURE — 93306 TTE W/DOPPLER COMPLETE: CPT | Mod: 26,,, | Performed by: INTERNAL MEDICINE

## 2019-09-23 PROCEDURE — 1100F PTFALLS ASSESS-DOCD GE2>/YR: CPT | Mod: CPTII,S$GLB,, | Performed by: INTERNAL MEDICINE

## 2019-09-23 PROCEDURE — 93306 TTE W/DOPPLER COMPLETE: CPT

## 2019-09-23 PROCEDURE — 3288F PR FALLS RISK ASSESSMENT DOCUMENTED: ICD-10-PCS | Mod: CPTII,S$GLB,, | Performed by: INTERNAL MEDICINE

## 2019-09-23 PROCEDURE — 3288F FALL RISK ASSESSMENT DOCD: CPT | Mod: CPTII,S$GLB,, | Performed by: INTERNAL MEDICINE

## 2019-09-23 PROCEDURE — 99214 PR OFFICE/OUTPT VISIT, EST, LEVL IV, 30-39 MIN: ICD-10-PCS | Mod: S$GLB,,, | Performed by: INTERNAL MEDICINE

## 2019-09-23 NOTE — PROGRESS NOTES
Interventional Cardiology Clinic Note  Reason for Visit: Aortic Stenosis follow up    HPI:   Ignacio Oakes is a 89 y.o. female referred by Dr Granados for evaluation of severe AS (NYHA Class III sx).  She has experienced multiple syncopal episodes the most recent of which resulted in a hospital admission, discharged 3/7/19 from [facility].  She was admitted for syncope and evaluation and treatment of a newly diagnosed heart murmur.  She had an ECHO which showed EF of 60%, with diastolic dysfunction, trivial aortic valve regurgitation, moderate to severe aortic valve stenosis, and mild tricuspid regurgitation. Medical therapy was recommended and she was discharged.  She is referred to us for TAVR evaluation.  She reports no chest pain, SOB, BHAGAT, PND, orthopnea.    The patient has undergone the following TAVR work-up:   ECHO (9.23.19) EF 60, WILTON 0.73 MG 26 mmHg, Vmax 3.3 m/s  LHC : pending   STS: pending  Frailty: 2/4 gait and hand-   Iliacs are pending  LVOT area by CTA is pending  Incidental findings on CT: pending  CT Surgery risk assessment: pending  Rhythm issues: NSR w/LVH  PFTs: pending  Comorbidities: HTN/HLD/HFpEF      Ignacio Oakes has moderate AS and will be followed Q6months with 2decho to assess severity of valve disease.    ROS:    Review of Systems   Constitution: Negative.   HENT: Negative.    Eyes: Negative.    Cardiovascular: Positive for syncope.   Respiratory: Negative.    Endocrine: Negative.    Skin: Negative.    Musculoskeletal: Negative.    Gastrointestinal: Negative.    Genitourinary: Negative.      PMH:     Past Medical History:   Diagnosis Date    High cholesterol     Hyperlipidemia     Hypertension     Syncope     Syncope and collapse      Past Surgical History:   Procedure Laterality Date    MYRINGOTOMY Right 3/2/2016    Performed by Teja [facility]chi MD at [facility] OR     Allergies:   Review of patient's allergies indicates:  No Known Allergies  Medications:  "    Current Outpatient Medications on File Prior to Visit   Medication Sig Dispense Refill    amlodipine-benazepril 10-20mg (LOTREL) 10-20 mg per capsule Take 1 capsule by mouth once daily.      aspirin (ECOTRIN) 81 MG EC tablet Take 81 mg by mouth once daily.      cephALEXin (KEFLEX) 250 MG capsule Take 2 capsules (500 mg total) by mouth 2 (two) times daily. for 5 days 20 capsule 0    metoprolol succinate (TOPROL XL) 50 MG 24 hr tablet Take 50 mg by mouth once daily.      multivit-mineral-iron-lutein Tab Take by mouth.      omeprazole (PRILOSEC) 20 MG capsule TK 1 C PO QD  1    rosuvastatin (CRESTOR) 5 MG tablet Take 5 mg by mouth once daily.       No current facility-administered medications on file prior to visit.      Social History:     Social History     Tobacco Use    Smoking status: Never Smoker    Smokeless tobacco: Never Used   Substance Use Topics    Alcohol use: No     Family History:   History reviewed. No pertinent family history.  Physical Exam:   BP (!) 172/77 (BP Location: Left arm, Patient Position: Sitting, BP Method: Large (Automatic))   Pulse 62   Ht 5' 2.5" (1.588 m)   Wt 51 kg (112 lb 7 oz)   LMP  (LMP Unknown)   SpO2 99%   BMI 20.24 kg/m²    Physical Exam   Constitutional: She is oriented to person, place, and time. She appears well-developed and well-nourished.   HENT:   Head: Normocephalic and atraumatic.   Eyes: Pupils are equal, round, and reactive to light. Conjunctivae and EOM are normal.   Neck: Normal range of motion. Neck supple. No JVD present.   Cardiovascular: Normal rate and regular rhythm. Exam reveals no gallop and no friction rub.   Murmur (3/6 early systolic cresc/decr murmur with rads to carotids and LSIS) heard.  Pulmonary/Chest: Effort normal and breath sounds normal. No respiratory distress. She has no wheezes. She has no rales. She exhibits no tenderness.   Abdominal: Soft. Bowel sounds are normal. She exhibits no distension. There is no tenderness. "   Musculoskeletal: Normal range of motion. She exhibits no edema or tenderness.   Neurological: She is alert and oriented to person, place, and time.   Skin: Skin is warm and dry. No erythema. No pallor.       Labs:     Lab Results   Component Value Date     09/18/2019    K 3.8 09/18/2019     09/18/2019    CO2 24 09/18/2019    BUN 21 09/18/2019    CREATININE 2.0 (H) 09/18/2019    ANIONGAP 9 09/18/2019     No results found for: HGBA1C  Lab Results   Component Value Date     (H) 03/05/2017    BNP 42 05/09/2015    Lab Results   Component Value Date    WBC 6.28 09/18/2019    HGB 9.3 (L) 09/18/2019    HCT 29.9 (L) 09/18/2019     09/18/2019    GRAN 4.5 09/18/2019    GRAN 71.0 09/18/2019     Lab Results   Component Value Date    CHOL 100 (L) 03/07/2017    HDL 33 (L) 03/07/2017    LDLCALC 57.0 (L) 03/07/2017    TRIG 50 03/07/2017          Imaging:   · Normal left ventricular systolic function. The estimated ejection fraction is 65%  · Mild left atrial enlargement.  · Grade II (moderate) indeterminate left ventricular diastolic function left ventricular diastolic dysfunction consistent with pseudonormalization. Elevated left atrial pressure.  · Normal right ventricular systolic function.  · Severe aortic valve stenosis; Normal EF, low stroke volume (33cc/m2) Aortic valve area is 0.72 cm2; peak velocity is 3.28 m/s; mean gradient is 26 mmHg. DI 0.23  · Mild tricuspid regurgitation.  · Mild mitral regurgitation.  · Normal central venous pressure (3 mm Hg).  · The estimated PA systolic pressure is 32 mm Hg    EKG: NSR LVH    Assessment and Plan:     Moderate to severe aortic stenosis  Low flow low gradient AS, does not meet criteria for TAVR  Stop betablocker given syncopal episodes    (HFpEF) heart failure with preserved ejection fraction  Diuretics if needed    Hypertension, unspecified type  Continue lotrel, adjust BP regimen per Dr Granados, avoid negative chronotropic agents    Hyperlipidemia,  unspecified hyperlipidemia type  Continue statin therapy      Signed:  Claudy Verde MD  9/23/2019 4:57 PM

## 2020-01-01 ENCOUNTER — OFFICE VISIT (OUTPATIENT)
Dept: CARDIOLOGY | Facility: CLINIC | Age: 85
End: 2020-01-01
Payer: MEDICARE

## 2020-01-01 ENCOUNTER — TELEPHONE (OUTPATIENT)
Dept: CARDIOLOGY | Facility: CLINIC | Age: 85
End: 2020-01-01

## 2020-01-01 ENCOUNTER — HOSPITAL ENCOUNTER (OUTPATIENT)
Dept: CARDIOLOGY | Facility: HOSPITAL | Age: 85
Discharge: HOME | End: 2020-09-21
Attending: INTERNAL MEDICINE
Payer: MEDICARE

## 2020-01-01 ENCOUNTER — HOSPITAL ENCOUNTER (OUTPATIENT)
Dept: RADIOLOGY | Facility: HOSPITAL | Age: 85
Discharge: HOME | End: 2020-10-19
Attending: INTERNAL MEDICINE
Payer: MEDICARE

## 2020-01-01 ENCOUNTER — HOSPITAL ENCOUNTER (OUTPATIENT)
Dept: CARDIOLOGY | Facility: OTHER | Age: 85
Discharge: HOME | End: 2020-06-20
Attending: EMERGENCY MEDICINE
Payer: MEDICARE

## 2020-01-01 ENCOUNTER — HOSPITAL ENCOUNTER (EMERGENCY)
Facility: OTHER | Age: 85
Discharge: HOME | End: 2020-01-18
Attending: EMERGENCY MEDICINE
Payer: MEDICARE

## 2020-01-01 ENCOUNTER — DOCUMENTATION ONLY (OUTPATIENT)
Dept: CARDIOLOGY | Facility: CLINIC | Age: 85
End: 2020-01-01

## 2020-01-01 ENCOUNTER — HOSPITAL ENCOUNTER (OUTPATIENT)
Facility: OTHER | Age: 85
Discharge: HOME HEALTH | End: 2020-06-21
Attending: EMERGENCY MEDICINE | Admitting: INTERNAL MEDICINE
Payer: MEDICARE

## 2020-01-01 VITALS
DIASTOLIC BLOOD PRESSURE: 100 MMHG | BODY MASS INDEX: 18.29 KG/M2 | SYSTOLIC BLOOD PRESSURE: 190 MMHG | WEIGHT: 103.19 LBS | HEIGHT: 63 IN | OXYGEN SATURATION: 98 % | HEART RATE: 71 BPM

## 2020-01-01 VITALS
DIASTOLIC BLOOD PRESSURE: 98 MMHG | HEIGHT: 62 IN | BODY MASS INDEX: 18.5 KG/M2 | DIASTOLIC BLOOD PRESSURE: 90 MMHG | HEART RATE: 68 BPM | HEART RATE: 70 BPM | OXYGEN SATURATION: 100 % | WEIGHT: 106 LBS | SYSTOLIC BLOOD PRESSURE: 214 MMHG | HEIGHT: 62 IN | BODY MASS INDEX: 19.51 KG/M2 | SYSTOLIC BLOOD PRESSURE: 160 MMHG | WEIGHT: 100.5 LBS

## 2020-01-01 VITALS
TEMPERATURE: 98 F | SYSTOLIC BLOOD PRESSURE: 149 MMHG | BODY MASS INDEX: 19.6 KG/M2 | HEART RATE: 60 BPM | WEIGHT: 106.5 LBS | DIASTOLIC BLOOD PRESSURE: 70 MMHG | HEIGHT: 62 IN | OXYGEN SATURATION: 97 % | RESPIRATION RATE: 12 BRPM

## 2020-01-01 VITALS
OXYGEN SATURATION: 95 % | WEIGHT: 112 LBS | DIASTOLIC BLOOD PRESSURE: 70 MMHG | RESPIRATION RATE: 20 BRPM | SYSTOLIC BLOOD PRESSURE: 154 MMHG | HEART RATE: 76 BPM | BODY MASS INDEX: 20.16 KG/M2 | TEMPERATURE: 98 F

## 2020-01-01 DIAGNOSIS — R55 SYNCOPE, UNSPECIFIED SYNCOPE TYPE: Primary | ICD-10-CM

## 2020-01-01 DIAGNOSIS — R55 SYNCOPE: ICD-10-CM

## 2020-01-01 DIAGNOSIS — I95.1 SYNCOPE DUE TO ORTHOSTATIC HYPOTENSION: ICD-10-CM

## 2020-01-01 DIAGNOSIS — N18.4 CKD (CHRONIC KIDNEY DISEASE) STAGE 4, GFR 15-29 ML/MIN: ICD-10-CM

## 2020-01-01 DIAGNOSIS — I35.0 AORTIC VALVE STENOSIS, ETIOLOGY OF CARDIAC VALVE DISEASE UNSPECIFIED: ICD-10-CM

## 2020-01-01 DIAGNOSIS — R07.2 PRECORDIAL PAIN: ICD-10-CM

## 2020-01-01 DIAGNOSIS — I35.0 AORTIC STENOSIS: ICD-10-CM

## 2020-01-01 DIAGNOSIS — I35.0 NONRHEUMATIC AORTIC VALVE STENOSIS: ICD-10-CM

## 2020-01-01 DIAGNOSIS — R07.9 CHEST PAIN: ICD-10-CM

## 2020-01-01 DIAGNOSIS — I95.1 SYNCOPE DUE TO ORTHOSTATIC HYPOTENSION: Primary | ICD-10-CM

## 2020-01-01 DIAGNOSIS — I35.0 NONRHEUMATIC AORTIC VALVE STENOSIS: Primary | ICD-10-CM

## 2020-01-01 DIAGNOSIS — R54 AGE-RELATED PHYSICAL DEBILITY: ICD-10-CM

## 2020-01-01 DIAGNOSIS — N18.9 CHRONIC KIDNEY DISEASE, UNSPECIFIED CKD STAGE: ICD-10-CM

## 2020-01-01 DIAGNOSIS — I45.9 STOKES-ADAMS SYNCOPE: ICD-10-CM

## 2020-01-01 DIAGNOSIS — R55 SYNCOPE, UNSPECIFIED SYNCOPE TYPE: ICD-10-CM

## 2020-01-01 DIAGNOSIS — I10 ESSENTIAL HYPERTENSION: ICD-10-CM

## 2020-01-01 DIAGNOSIS — I35.0 SEVERE AORTIC STENOSIS: Primary | ICD-10-CM

## 2020-01-01 DIAGNOSIS — E78.5 HYPERLIPIDEMIA, UNSPECIFIED HYPERLIPIDEMIA TYPE: ICD-10-CM

## 2020-01-01 DIAGNOSIS — R40.20 LOC (LOSS OF CONSCIOUSNESS): ICD-10-CM

## 2020-01-01 LAB
ALBUMIN SERPL BCP-MCNC: 3.6 G/DL (ref 3.5–5.2)
ALBUMIN SERPL BCP-MCNC: 3.8 G/DL (ref 3.5–5.2)
ALP SERPL-CCNC: 60 U/L (ref 55–135)
ALP SERPL-CCNC: 62 U/L (ref 55–135)
ALT SERPL W/O P-5'-P-CCNC: 8 U/L (ref 10–44)
ALT SERPL W/O P-5'-P-CCNC: 8 U/L (ref 10–44)
ANION GAP SERPL CALC-SCNC: 10 MMOL/L (ref 8–16)
ANION GAP SERPL CALC-SCNC: 11 MMOL/L (ref 8–16)
ANION GAP SERPL CALC-SCNC: 13 MMOL/L (ref 8–16)
ANION GAP SERPL CALC-SCNC: 13 MMOL/L (ref 8–16)
ASCENDING AORTA: 3.23 CM
AST SERPL-CCNC: 18 U/L (ref 10–40)
AST SERPL-CCNC: 18 U/L (ref 10–40)
AV INDEX (PROSTH): 0.19
AV INDEX (PROSTH): 0.3
AV MEAN GRADIENT: 32 MMHG
AV MEAN GRADIENT: 41 MMHG
AV PEAK GRADIENT: 50 MMHG
AV PEAK GRADIENT: 67 MMHG
AV REGURGITATION PRESSURE HALF TIME: 510 MS
AV VALVE AREA: 0.6 CM2
AV VALVE AREA: 1.06 CM2
AV VELOCITY RATIO: 0.18
AV VELOCITY RATIO: 0.24
BASOPHILS # BLD AUTO: 0.03 K/UL (ref 0–0.2)
BASOPHILS # BLD AUTO: 0.03 K/UL (ref 0–0.2)
BASOPHILS # BLD AUTO: 0.04 K/UL (ref 0–0.2)
BASOPHILS # BLD AUTO: 0.06 K/UL (ref 0–0.2)
BASOPHILS NFR BLD: 0.4 % (ref 0–1.9)
BASOPHILS NFR BLD: 0.5 % (ref 0–1.9)
BASOPHILS NFR BLD: 0.7 % (ref 0–1.9)
BASOPHILS NFR BLD: 1 % (ref 0–1.9)
BILIRUB SERPL-MCNC: 0.5 MG/DL (ref 0.1–1)
BILIRUB SERPL-MCNC: 0.7 MG/DL (ref 0.1–1)
BILIRUB UR QL STRIP: NEGATIVE
BSA FOR ECHO PROCEDURE: 1.45 M2
BSA FOR ECHO PROCEDURE: 1.45 M2
BUN SERPL-MCNC: 14 MG/DL (ref 10–30)
BUN SERPL-MCNC: 16 MG/DL (ref 10–30)
BUN SERPL-MCNC: 17 MG/DL (ref 10–30)
BUN SERPL-MCNC: 20 MG/DL (ref 10–30)
CALCIUM SERPL-MCNC: 8.7 MG/DL (ref 8.7–10.5)
CALCIUM SERPL-MCNC: 8.8 MG/DL (ref 8.7–10.5)
CALCIUM SERPL-MCNC: 8.9 MG/DL (ref 8.7–10.5)
CALCIUM SERPL-MCNC: 9.1 MG/DL (ref 8.7–10.5)
CHLORIDE SERPL-SCNC: 106 MMOL/L (ref 95–110)
CHLORIDE SERPL-SCNC: 107 MMOL/L (ref 95–110)
CHLORIDE SERPL-SCNC: 107 MMOL/L (ref 95–110)
CHLORIDE SERPL-SCNC: 109 MMOL/L (ref 95–110)
CLARITY UR: CLEAR
CO2 SERPL-SCNC: 19 MMOL/L (ref 23–29)
CO2 SERPL-SCNC: 19 MMOL/L (ref 23–29)
CO2 SERPL-SCNC: 22 MMOL/L (ref 23–29)
CO2 SERPL-SCNC: 26 MMOL/L (ref 23–29)
COLOR UR: YELLOW
CREAT SERPL-MCNC: 1.5 MG/DL (ref 0.5–1.4)
CREAT SERPL-MCNC: 1.7 MG/DL (ref 0.5–1.4)
CREAT SERPL-MCNC: 1.7 MG/DL (ref 0.5–1.4)
CV ECHO LV RWT: 0.52 CM
CV ECHO LV RWT: 0.62 CM
DIFFERENTIAL METHOD: ABNORMAL
DOP CALC AO PEAK VEL: 3.55 M/S
DOP CALC AO PEAK VEL: 4.1 M/S
DOP CALC AO VTI: 78.96 CM
DOP CALC AO VTI: 96 CM
DOP CALC LVOT AREA: 3.2 CM2
DOP CALC LVOT AREA: 3.5 CM2
DOP CALC LVOT DIAMETER: 2.03 CM
DOP CALC LVOT DIAMETER: 2.11 CM
DOP CALC LVOT PEAK VEL: 0.73 M/S
DOP CALC LVOT PEAK VEL: 0.86 M/S
DOP CALC LVOT STROKE VOLUME: 58.03 CM3
DOP CALC LVOT STROKE VOLUME: 83.63 CM3
DOP CALCLVOT PEAK VEL VTI: 17.94 CM
DOP CALCLVOT PEAK VEL VTI: 23.93 CM
E WAVE DECELERATION TIME: 220.18 MSEC
E WAVE DECELERATION TIME: 365.98 MSEC
E/A RATIO: 0.62
E/A RATIO: 0.63
E/E' RATIO: 12.89 M/S
E/E' RATIO: 16 M/S
ECHO LV POSTERIOR WALL: 0.96 CM (ref 0.6–1.1)
ECHO LV POSTERIOR WALL: 1.18 CM (ref 0.6–1.1)
EOSINOPHIL # BLD AUTO: 0 K/UL (ref 0–0.5)
EOSINOPHIL # BLD AUTO: 0.1 K/UL (ref 0–0.5)
EOSINOPHIL NFR BLD: 0.3 % (ref 0–8)
EOSINOPHIL NFR BLD: 0.8 % (ref 0–8)
EOSINOPHIL NFR BLD: 0.9 % (ref 0–8)
EOSINOPHIL NFR BLD: 1.2 % (ref 0–8)
ERYTHROCYTE [DISTWIDTH] IN BLOOD BY AUTOMATED COUNT: 13.9 % (ref 11.5–14.5)
ERYTHROCYTE [DISTWIDTH] IN BLOOD BY AUTOMATED COUNT: 14.2 % (ref 11.5–14.5)
ERYTHROCYTE [DISTWIDTH] IN BLOOD BY AUTOMATED COUNT: 14.3 % (ref 11.5–14.5)
ERYTHROCYTE [DISTWIDTH] IN BLOOD BY AUTOMATED COUNT: 14.4 % (ref 11.5–14.5)
EST. GFR  (AFRICAN AMERICAN): 29 ML/MIN/1.73 M^2
EST. GFR  (AFRICAN AMERICAN): 29 ML/MIN/1.73 M^2
EST. GFR  (AFRICAN AMERICAN): 34 ML/MIN/1.73 M^2
EST. GFR  (AFRICAN AMERICAN): 34 ML/MIN/1.73 M^2
EST. GFR  (NON AFRICAN AMERICAN): 25 ML/MIN/1.73 M^2
EST. GFR  (NON AFRICAN AMERICAN): 25 ML/MIN/1.73 M^2
EST. GFR  (NON AFRICAN AMERICAN): 30 ML/MIN/1.73 M^2
EST. GFR  (NON AFRICAN AMERICAN): 30 ML/MIN/1.73 M^2
FRACTIONAL SHORTENING: 27 % (ref 28–44)
FRACTIONAL SHORTENING: 31 % (ref 28–44)
GLUCOSE SERPL-MCNC: 101 MG/DL (ref 70–110)
GLUCOSE SERPL-MCNC: 108 MG/DL (ref 70–110)
GLUCOSE SERPL-MCNC: 67 MG/DL (ref 70–110)
GLUCOSE SERPL-MCNC: 76 MG/DL (ref 70–110)
GLUCOSE UR QL STRIP: NEGATIVE
HCT VFR BLD AUTO: 31.2 % (ref 37–48.5)
HCT VFR BLD AUTO: 31.5 % (ref 37–48.5)
HCT VFR BLD AUTO: 32.2 % (ref 37–48.5)
HCT VFR BLD AUTO: 34.8 % (ref 37–48.5)
HGB BLD-MCNC: 10.3 G/DL (ref 12–16)
HGB BLD-MCNC: 9.5 G/DL (ref 12–16)
HGB BLD-MCNC: 9.8 G/DL (ref 12–16)
HGB BLD-MCNC: 9.9 G/DL (ref 12–16)
HGB UR QL STRIP: ABNORMAL
IMM GRANULOCYTES # BLD AUTO: 0.01 K/UL (ref 0–0.04)
IMM GRANULOCYTES # BLD AUTO: 0.03 K/UL (ref 0–0.04)
IMM GRANULOCYTES NFR BLD AUTO: 0.2 % (ref 0–0.5)
IMM GRANULOCYTES NFR BLD AUTO: 0.4 % (ref 0–0.5)
INTERVENTRICULAR SEPTUM: 1.07 CM (ref 0.6–1.1)
INTERVENTRICULAR SEPTUM: 1.25 CM (ref 0.6–1.1)
IVC PROX: 1.1 CM
KETONES UR QL STRIP: NEGATIVE
LA MAJOR: 4.92 CM
LA MAJOR: 5.13 CM
LA MINOR: 5.07 CM
LA MINOR: 5.5 CM
LA WIDTH: 3.07 CM
LA WIDTH: 4.2 CM
LEFT ATRIUM SIZE: 4.45 CM
LEFT ATRIUM SIZE: 4.56 CM
LEFT ATRIUM VOLUME INDEX MOD: 32.1 ML/M2
LEFT ATRIUM VOLUME INDEX: 41.2 ML/M2
LEFT ATRIUM VOLUME INDEX: 56.9 ML/M2
LEFT ATRIUM VOLUME MOD: 47 CM3
LEFT ATRIUM VOLUME: 60.31 CM3
LEFT ATRIUM VOLUME: 83.02 CM3
LEFT INTERNAL DIMENSION IN SYSTOLE: 2.52 CM (ref 2.1–4)
LEFT INTERNAL DIMENSION IN SYSTOLE: 2.79 CM (ref 2.1–4)
LEFT VENTRICLE DIASTOLIC VOLUME INDEX: 38.93 ML/M2
LEFT VENTRICLE DIASTOLIC VOLUME INDEX: 42.91 ML/M2
LEFT VENTRICLE DIASTOLIC VOLUME: 56.82 ML
LEFT VENTRICLE DIASTOLIC VOLUME: 62.74 ML
LEFT VENTRICLE MASS INDEX: 108 G/M2
LEFT VENTRICLE MASS INDEX: 78 G/M2
LEFT VENTRICLE SYSTOLIC VOLUME INDEX: 15.6 ML/M2
LEFT VENTRICLE SYSTOLIC VOLUME INDEX: 20 ML/M2
LEFT VENTRICLE SYSTOLIC VOLUME: 22.76 ML
LEFT VENTRICLE SYSTOLIC VOLUME: 29.31 ML
LEFT VENTRICULAR INTERNAL DIMENSION IN DIASTOLE: 3.67 CM (ref 3.5–6)
LEFT VENTRICULAR INTERNAL DIMENSION IN DIASTOLE: 3.82 CM (ref 3.5–6)
LEFT VENTRICULAR MASS: 113.55 G
LEFT VENTRICULAR MASS: 157.35 G
LEUKOCYTE ESTERASE UR QL STRIP: NEGATIVE
LV LATERAL E/E' RATIO: 14.5 M/S
LV LATERAL E/E' RATIO: 16 M/S
LV SEPTAL E/E' RATIO: 11.6 M/S
LV SEPTAL E/E' RATIO: 16 M/S
LYMPHOCYTES # BLD AUTO: 0.8 K/UL (ref 1–4.8)
LYMPHOCYTES # BLD AUTO: 1 K/UL (ref 1–4.8)
LYMPHOCYTES # BLD AUTO: 1.4 K/UL (ref 1–4.8)
LYMPHOCYTES # BLD AUTO: 1.4 K/UL (ref 1–4.8)
LYMPHOCYTES NFR BLD: 11.6 % (ref 18–48)
LYMPHOCYTES NFR BLD: 17.4 % (ref 18–48)
LYMPHOCYTES NFR BLD: 21.8 % (ref 18–48)
LYMPHOCYTES NFR BLD: 24.7 % (ref 18–48)
MAGNESIUM SERPL-MCNC: 2.1 MG/DL (ref 1.6–2.6)
MCH RBC QN AUTO: 27.1 PG (ref 27–31)
MCH RBC QN AUTO: 27.9 PG (ref 27–31)
MCH RBC QN AUTO: 28.1 PG (ref 27–31)
MCH RBC QN AUTO: 28.6 PG (ref 27–31)
MCHC RBC AUTO-ENTMCNC: 29.6 G/DL (ref 32–36)
MCHC RBC AUTO-ENTMCNC: 30.4 G/DL (ref 32–36)
MCHC RBC AUTO-ENTMCNC: 30.4 G/DL (ref 32–36)
MCHC RBC AUTO-ENTMCNC: 31.4 G/DL (ref 32–36)
MCV RBC AUTO: 89 FL (ref 82–98)
MCV RBC AUTO: 91 FL (ref 82–98)
MCV RBC AUTO: 92 FL (ref 82–98)
MCV RBC AUTO: 95 FL (ref 82–98)
MONOCYTES # BLD AUTO: 0.4 K/UL (ref 0.3–1)
MONOCYTES # BLD AUTO: 0.4 K/UL (ref 0.3–1)
MONOCYTES # BLD AUTO: 0.5 K/UL (ref 0.3–1)
MONOCYTES # BLD AUTO: 0.6 K/UL (ref 0.3–1)
MONOCYTES NFR BLD: 5 % (ref 4–15)
MONOCYTES NFR BLD: 6.3 % (ref 4–15)
MONOCYTES NFR BLD: 8.7 % (ref 4–15)
MONOCYTES NFR BLD: 9.5 % (ref 4–15)
MV PEAK A VEL: 0.93 M/S
MV PEAK A VEL: 1.02 M/S
MV PEAK E VEL: 0.58 M/S
MV PEAK E VEL: 0.64 M/S
MV STENOSIS PRESSURE HALF TIME: 63.85 MS
MV VALVE AREA P 1/2 METHOD: 3.45 CM2
NEUTROPHILS # BLD AUTO: 3.7 K/UL (ref 1.8–7.7)
NEUTROPHILS # BLD AUTO: 4.1 K/UL (ref 1.8–7.7)
NEUTROPHILS # BLD AUTO: 4.2 K/UL (ref 1.8–7.7)
NEUTROPHILS # BLD AUTO: 5.9 K/UL (ref 1.8–7.7)
NEUTROPHILS NFR BLD: 64.7 % (ref 38–73)
NEUTROPHILS NFR BLD: 66.7 % (ref 38–73)
NEUTROPHILS NFR BLD: 74.5 % (ref 38–73)
NEUTROPHILS NFR BLD: 82.3 % (ref 38–73)
NITRITE UR QL STRIP: NEGATIVE
NRBC BLD-RTO: 0 /100 WBC
PH UR STRIP: 6 [PH] (ref 5–8)
PISA MRMAX VEL: 0.05 M/S
PISA MRMAX VEL: 0.07 M/S
PISA TR MAX VEL: 2.6 M/S
PISA TR MAX VEL: 2.81 M/S
PLATELET # BLD AUTO: 131 K/UL (ref 150–350)
PLATELET # BLD AUTO: 246 K/UL (ref 150–350)
PLATELET # BLD AUTO: 254 K/UL (ref 150–350)
PLATELET # BLD AUTO: 257 K/UL (ref 150–350)
PMV BLD AUTO: 10.8 FL (ref 9.2–12.9)
PMV BLD AUTO: 10.9 FL (ref 9.2–12.9)
PMV BLD AUTO: 11.4 FL (ref 9.2–12.9)
PMV BLD AUTO: 13 FL (ref 9.2–12.9)
POCT GLUCOSE: 140 MG/DL (ref 70–110)
POTASSIUM SERPL-SCNC: 3.3 MMOL/L (ref 3.5–5.1)
POTASSIUM SERPL-SCNC: 4.1 MMOL/L (ref 3.5–5.1)
POTASSIUM SERPL-SCNC: 4.3 MMOL/L (ref 3.5–5.1)
POTASSIUM SERPL-SCNC: 4.5 MMOL/L (ref 3.5–5.1)
PROT SERPL-MCNC: 6.9 G/DL (ref 6–8.4)
PROT SERPL-MCNC: 7.1 G/DL (ref 6–8.4)
PROT UR QL STRIP: NEGATIVE
PULM VEIN S/D RATIO: 1.67
PV PEAK D VEL: 0.43 M/S
PV PEAK S VEL: 0.72 M/S
PV PEAK VELOCITY: 0.76 CM/S
RA MAJOR: 4.16 CM
RA PRESSURE: 3 MMHG
RA PRESSURE: 3 MMHG
RA WIDTH: 2.42 CM
RBC # BLD AUTO: 3.46 M/UL (ref 4–5.4)
RBC # BLD AUTO: 3.5 M/UL (ref 4–5.4)
RBC # BLD AUTO: 3.51 M/UL (ref 4–5.4)
RBC # BLD AUTO: 3.67 M/UL (ref 4–5.4)
RIGHT VENTRICULAR END-DIASTOLIC DIMENSION: 2.73 CM
SAMPLE: ABNORMAL
SARS-COV-2 RDRP RESP QL NAA+PROBE: NEGATIVE
SINUS: 3.09 CM
SINUS: 3.38 CM
SODIUM SERPL-SCNC: 139 MMOL/L (ref 136–145)
SODIUM SERPL-SCNC: 139 MMOL/L (ref 136–145)
SODIUM SERPL-SCNC: 141 MMOL/L (ref 136–145)
SODIUM SERPL-SCNC: 143 MMOL/L (ref 136–145)
SP GR UR STRIP: 1.02 (ref 1–1.03)
STJ: 2.27 CM
STJ: 2.49 CM
TDI LATERAL: 0.04 M/S
TDI LATERAL: 0.04 M/S
TDI SEPTAL: 0.04 M/S
TDI SEPTAL: 0.05 M/S
TDI: 0.04 M/S
TDI: 0.05 M/S
TR MAX PG: 27 MMHG
TR MAX PG: 32 MMHG
TRICUSPID ANNULAR PLANE SYSTOLIC EXCURSION: 1.49 CM
TRICUSPID ANNULAR PLANE SYSTOLIC EXCURSION: 1.93 CM
TROPONIN I SERPL DL<=0.01 NG/ML-MCNC: 0.02 NG/ML (ref 0–0.03)
TROPONIN I SERPL DL<=0.01 NG/ML-MCNC: 0.02 NG/ML (ref 0–0.03)
TROPONIN I SERPL DL<=0.01 NG/ML-MCNC: 0.04 NG/ML (ref 0–0.03)
TV REST PULMONARY ARTERY PRESSURE: 30 MMHG
TV REST PULMONARY ARTERY PRESSURE: 35 MMHG
URN SPEC COLLECT METH UR: ABNORMAL
UROBILINOGEN UR STRIP-ACNC: NEGATIVE EU/DL
WBC # BLD AUTO: 5.68 K/UL (ref 3.9–12.7)
WBC # BLD AUTO: 5.74 K/UL (ref 3.9–12.7)
WBC # BLD AUTO: 6.2 K/UL (ref 3.9–12.7)
WBC # BLD AUTO: 7.15 K/UL (ref 3.9–12.7)

## 2020-01-01 PROCEDURE — 25000003 PHARM REV CODE 250: Performed by: NURSE PRACTITIONER

## 2020-01-01 PROCEDURE — G0378 HOSPITAL OBSERVATION PER HR: HCPCS

## 2020-01-01 PROCEDURE — 94761 N-INVAS EAR/PLS OXIMETRY MLT: CPT

## 2020-01-01 PROCEDURE — 93306 ECHO (CUPID ONLY): ICD-10-PCS | Mod: 26,,, | Performed by: INTERNAL MEDICINE

## 2020-01-01 PROCEDURE — 96361 HYDRATE IV INFUSION ADD-ON: CPT

## 2020-01-01 PROCEDURE — 96360 HYDRATION IV INFUSION INIT: CPT

## 2020-01-01 PROCEDURE — 25000003 PHARM REV CODE 250: Performed by: EMERGENCY MEDICINE

## 2020-01-01 PROCEDURE — 93005 ELECTROCARDIOGRAM TRACING: CPT

## 2020-01-01 PROCEDURE — 1159F PR MEDICATION LIST DOCUMENTED IN MEDICAL RECORD: ICD-10-PCS | Mod: S$GLB,,, | Performed by: INTERNAL MEDICINE

## 2020-01-01 PROCEDURE — 74174 CTA ABD&PLVS W/CONTRAST: CPT | Mod: 26,,, | Performed by: RADIOLOGY

## 2020-01-01 PROCEDURE — 25000003 PHARM REV CODE 250: Performed by: INTERNAL MEDICINE

## 2020-01-01 PROCEDURE — 81003 URINALYSIS AUTO W/O SCOPE: CPT

## 2020-01-01 PROCEDURE — 99999 PR PBB SHADOW E&M-EST. PATIENT-LVL III: CPT | Mod: PBBFAC,,,

## 2020-01-01 PROCEDURE — 99217 PR OBSERVATION CARE DISCHARGE: ICD-10-PCS | Mod: ,,, | Performed by: NURSE PRACTITIONER

## 2020-01-01 PROCEDURE — 85025 COMPLETE CBC W/AUTO DIFF WBC: CPT

## 2020-01-01 PROCEDURE — 99220 PR INITIAL OBSERVATION CARE,LEVL III: ICD-10-PCS | Mod: ,,, | Performed by: NURSE PRACTITIONER

## 2020-01-01 PROCEDURE — 84484 ASSAY OF TROPONIN QUANT: CPT

## 2020-01-01 PROCEDURE — 99214 PR OFFICE/OUTPT VISIT, EST, LEVL IV, 30-39 MIN: ICD-10-PCS | Mod: S$GLB,,, | Performed by: THORACIC SURGERY (CARDIOTHORACIC VASCULAR SURGERY)

## 2020-01-01 PROCEDURE — 80048 BASIC METABOLIC PNL TOTAL CA: CPT

## 2020-01-01 PROCEDURE — 99214 OFFICE O/P EST MOD 30 MIN: CPT | Mod: S$GLB,,, | Performed by: INTERNAL MEDICINE

## 2020-01-01 PROCEDURE — 1126F PR PAIN SEVERITY QUANTIFIED, NO PAIN PRESENT: ICD-10-PCS | Mod: S$GLB,,, | Performed by: INTERNAL MEDICINE

## 2020-01-01 PROCEDURE — 25500020 PHARM REV CODE 255: Performed by: INTERNAL MEDICINE

## 2020-01-01 PROCEDURE — 99214 PR OFFICE/OUTPT VISIT, EST, LEVL IV, 30-39 MIN: ICD-10-PCS | Mod: S$GLB,,, | Performed by: INTERNAL MEDICINE

## 2020-01-01 PROCEDURE — 99220 PR INITIAL OBSERVATION CARE,LEVL III: CPT | Mod: ,,, | Performed by: NURSE PRACTITIONER

## 2020-01-01 PROCEDURE — G0378 HOSPITAL OBSERVATION PER HR: HCPCS | Mod: CS

## 2020-01-01 PROCEDURE — 99217 PR OBSERVATION CARE DISCHARGE: CPT | Mod: ,,, | Performed by: NURSE PRACTITIONER

## 2020-01-01 PROCEDURE — 82962 GLUCOSE BLOOD TEST: CPT

## 2020-01-01 PROCEDURE — 99219 PR INITIAL OBSERVATION CARE,LEVL II: ICD-10-PCS | Mod: ,,, | Performed by: INTERNAL MEDICINE

## 2020-01-01 PROCEDURE — 97162 PT EVAL MOD COMPLEX 30 MIN: CPT

## 2020-01-01 PROCEDURE — 99225 PR SUBSEQUENT OBSERVATION CARE,LEVEL II: CPT | Mod: ,,, | Performed by: INTERNAL MEDICINE

## 2020-01-01 PROCEDURE — 99284 EMERGENCY DEPT VISIT MOD MDM: CPT | Mod: 25

## 2020-01-01 PROCEDURE — 99225 PR SUBSEQUENT OBSERVATION CARE,LEVEL II: ICD-10-PCS | Mod: ,,, | Performed by: INTERNAL MEDICINE

## 2020-01-01 PROCEDURE — 93010 EKG 12-LEAD: ICD-10-PCS | Mod: 76,,, | Performed by: INTERNAL MEDICINE

## 2020-01-01 PROCEDURE — 36415 COLL VENOUS BLD VENIPUNCTURE: CPT

## 2020-01-01 PROCEDURE — 93010 EKG 12-LEAD: ICD-10-PCS | Mod: ,,, | Performed by: INTERNAL MEDICINE

## 2020-01-01 PROCEDURE — 83735 ASSAY OF MAGNESIUM: CPT

## 2020-01-01 PROCEDURE — 99285 EMERGENCY DEPT VISIT HI MDM: CPT | Mod: 25

## 2020-01-01 PROCEDURE — 93306 TTE W/DOPPLER COMPLETE: CPT

## 2020-01-01 PROCEDURE — 99219 PR INITIAL OBSERVATION CARE,LEVL II: CPT | Mod: ,,, | Performed by: INTERNAL MEDICINE

## 2020-01-01 PROCEDURE — 99999 PR PBB SHADOW E&M-EST. PATIENT-LVL IV: ICD-10-PCS | Mod: PBBFAC,,,

## 2020-01-01 PROCEDURE — 71275 CTA CARDIAC TAVR_PARTNERS (XPD): ICD-10-PCS | Mod: 26,,, | Performed by: RADIOLOGY

## 2020-01-01 PROCEDURE — 80053 COMPREHEN METABOLIC PANEL: CPT

## 2020-01-01 PROCEDURE — U0002 COVID-19 LAB TEST NON-CDC: HCPCS

## 2020-01-01 PROCEDURE — 99226 PR SUBSEQUENT OBSERVATION CARE,LEVEL III: CPT | Mod: ,,, | Performed by: NURSE PRACTITIONER

## 2020-01-01 PROCEDURE — 63600175 PHARM REV CODE 636 W HCPCS: Performed by: EMERGENCY MEDICINE

## 2020-01-01 PROCEDURE — 99999 PR PBB SHADOW E&M-EST. PATIENT-LVL IV: CPT | Mod: PBBFAC,,,

## 2020-01-01 PROCEDURE — 93010 ELECTROCARDIOGRAM REPORT: CPT | Mod: ,,, | Performed by: INTERNAL MEDICINE

## 2020-01-01 PROCEDURE — 71275 CT ANGIOGRAPHY CHEST: CPT | Mod: 26,,, | Performed by: RADIOLOGY

## 2020-01-01 PROCEDURE — 1159F MED LIST DOCD IN RCRD: CPT | Mod: S$GLB,,, | Performed by: INTERNAL MEDICINE

## 2020-01-01 PROCEDURE — 99226 PR SUBSEQUENT OBSERVATION CARE,LEVEL III: ICD-10-PCS | Mod: ,,, | Performed by: NURSE PRACTITIONER

## 2020-01-01 PROCEDURE — 63600175 PHARM REV CODE 636 W HCPCS: Performed by: NURSE PRACTITIONER

## 2020-01-01 PROCEDURE — 99214 OFFICE O/P EST MOD 30 MIN: CPT | Mod: S$GLB,,, | Performed by: THORACIC SURGERY (CARDIOTHORACIC VASCULAR SURGERY)

## 2020-01-01 PROCEDURE — 93306 TTE W/DOPPLER COMPLETE: CPT | Mod: 26,,, | Performed by: INTERNAL MEDICINE

## 2020-01-01 PROCEDURE — 99999 PR PBB SHADOW E&M-EST. PATIENT-LVL III: ICD-10-PCS | Mod: PBBFAC,,,

## 2020-01-01 PROCEDURE — 1126F AMNT PAIN NOTED NONE PRSNT: CPT | Mod: S$GLB,,, | Performed by: INTERNAL MEDICINE

## 2020-01-01 PROCEDURE — 1159F PR MEDICATION LIST DOCUMENTED IN MEDICAL RECORD: ICD-10-PCS | Mod: S$GLB,,, | Performed by: THORACIC SURGERY (CARDIOTHORACIC VASCULAR SURGERY)

## 2020-01-01 PROCEDURE — 96374 THER/PROPH/DIAG INJ IV PUSH: CPT

## 2020-01-01 PROCEDURE — 74174 CTA CARDIAC TAVR_PARTNERS (XPD): ICD-10-PCS | Mod: 26,,, | Performed by: RADIOLOGY

## 2020-01-01 PROCEDURE — 71275 CT ANGIOGRAPHY CHEST: CPT | Mod: TC

## 2020-01-01 PROCEDURE — 1159F MED LIST DOCD IN RCRD: CPT | Mod: S$GLB,,, | Performed by: THORACIC SURGERY (CARDIOTHORACIC VASCULAR SURGERY)

## 2020-01-01 PROCEDURE — 97165 OT EVAL LOW COMPLEX 30 MIN: CPT

## 2020-01-01 RX ORDER — SODIUM CHLORIDE 9 MG/ML
INJECTION, SOLUTION INTRAVENOUS CONTINUOUS
Status: DISCONTINUED | OUTPATIENT
Start: 2020-01-01 | End: 2020-01-01

## 2020-01-01 RX ORDER — SODIUM CHLORIDE 0.9 % (FLUSH) 0.9 %
10 SYRINGE (ML) INJECTION
Status: DISCONTINUED | OUTPATIENT
Start: 2020-01-01 | End: 2020-01-01

## 2020-01-01 RX ORDER — ASPIRIN 81 MG/1
81 TABLET ORAL DAILY
Status: DISCONTINUED | OUTPATIENT
Start: 2020-01-01 | End: 2020-01-01 | Stop reason: HOSPADM

## 2020-01-01 RX ORDER — AMLODIPINE BESYLATE 5 MG/1
10 TABLET ORAL DAILY
Status: DISCONTINUED | OUTPATIENT
Start: 2020-01-01 | End: 2020-01-01 | Stop reason: HOSPADM

## 2020-01-01 RX ORDER — POTASSIUM CHLORIDE 20 MEQ/1
40 TABLET, EXTENDED RELEASE ORAL
Status: COMPLETED | OUTPATIENT
Start: 2020-01-01 | End: 2020-01-01

## 2020-01-01 RX ORDER — SODIUM CHLORIDE 0.9 % (FLUSH) 0.9 %
10 SYRINGE (ML) INJECTION
Status: DISCONTINUED | OUTPATIENT
Start: 2020-01-01 | End: 2020-01-01 | Stop reason: HOSPADM

## 2020-01-01 RX ORDER — VALSARTAN 80 MG/1
80 TABLET ORAL DAILY
COMMUNITY
End: 2020-01-01

## 2020-01-01 RX ORDER — HYDRALAZINE HYDROCHLORIDE 20 MG/ML
10 INJECTION INTRAMUSCULAR; INTRAVENOUS EVERY 8 HOURS PRN
Status: DISCONTINUED | OUTPATIENT
Start: 2020-01-01 | End: 2020-01-01 | Stop reason: HOSPADM

## 2020-01-01 RX ORDER — MECLIZINE HCL 12.5 MG 12.5 MG/1
12.5 TABLET ORAL 3 TIMES DAILY PRN
COMMUNITY

## 2020-01-01 RX ORDER — METOPROLOL SUCCINATE 50 MG/1
50 TABLET, EXTENDED RELEASE ORAL DAILY
Status: DISCONTINUED | OUTPATIENT
Start: 2020-01-01 | End: 2020-01-01

## 2020-01-01 RX ORDER — BENAZEPRIL HYDROCHLORIDE 10 MG/1
20 TABLET ORAL DAILY
Status: DISCONTINUED | OUTPATIENT
Start: 2020-01-01 | End: 2020-01-01 | Stop reason: HOSPADM

## 2020-01-01 RX ORDER — ROSUVASTATIN CALCIUM 5 MG/1
5 TABLET, COATED ORAL DAILY
Status: DISCONTINUED | OUTPATIENT
Start: 2020-01-01 | End: 2020-01-01 | Stop reason: HOSPADM

## 2020-01-01 RX ORDER — ACETAMINOPHEN 325 MG/1
650 TABLET ORAL EVERY 4 HOURS PRN
Status: DISCONTINUED | OUTPATIENT
Start: 2020-01-01 | End: 2020-01-01 | Stop reason: HOSPADM

## 2020-01-01 RX ORDER — AMLODIPINE AND BENAZEPRIL HYDROCHLORIDE 10; 20 MG/1; MG/1
1 CAPSULE ORAL DAILY
Status: DISCONTINUED | OUTPATIENT
Start: 2020-01-01 | End: 2020-01-01

## 2020-01-01 RX ORDER — ONDANSETRON 8 MG/1
8 TABLET, ORALLY DISINTEGRATING ORAL EVERY 8 HOURS PRN
Status: DISCONTINUED | OUTPATIENT
Start: 2020-01-01 | End: 2020-01-01 | Stop reason: HOSPADM

## 2020-01-01 RX ADMIN — ROSUVASTATIN CALCIUM 5 MG: 5 TABLET, COATED ORAL at 09:06

## 2020-01-01 RX ADMIN — POTASSIUM CHLORIDE 40 MEQ: 1500 TABLET, EXTENDED RELEASE ORAL at 04:01

## 2020-01-01 RX ADMIN — SODIUM CHLORIDE: 0.9 INJECTION, SOLUTION INTRAVENOUS at 05:06

## 2020-01-01 RX ADMIN — BENAZEPRIL HYDROCHLORIDE 20 MG: 10 TABLET ORAL at 09:06

## 2020-01-01 RX ADMIN — ASPIRIN 81 MG: 81 TABLET, COATED ORAL at 09:06

## 2020-01-01 RX ADMIN — AMLODIPINE BESYLATE 10 MG: 5 TABLET ORAL at 09:06

## 2020-01-01 RX ADMIN — IOHEXOL 100 ML: 350 INJECTION, SOLUTION INTRAVENOUS at 12:10

## 2020-01-01 RX ADMIN — METOPROLOL SUCCINATE 50 MG: 50 TABLET, EXTENDED RELEASE ORAL at 09:06

## 2020-01-01 RX ADMIN — SODIUM CHLORIDE 500 ML: 0.9 INJECTION, SOLUTION INTRAVENOUS at 12:01

## 2020-01-01 RX ADMIN — HYDRALAZINE HYDROCHLORIDE 10 MG: 20 INJECTION INTRAMUSCULAR; INTRAVENOUS at 09:06

## 2020-01-18 NOTE — ED PROVIDER NOTES
"Encounter Date: 1/18/2020    SCRIBE #1 NOTE: I, Silvia Henry, am scribing for, and in the presence of, Dr. Gong .       History     Chief Complaint   Patient presents with    Loss of Consciousness     Per [facility] pt transported from home w/ reports + witnessed syncope by family while sitting down at table. " she went out when she was sitting down and hit her head on the table. She woke up a few minutes later". Denies chest pains, SOB, duizziness,weakness, aphasia      Time seen by provider: 12:13 PM    This is a 89 y.o. female who presents with complaint of  LOC since today. Her daughter reports that she was eating breakfast and drinking coffee right before the episode. She has a valve problem with her heart and sometimes these episodes occur due to a lack of oxygen to the brain according to her daughter. The patient typically wakes up after use of a cold compress to her head and shaking however during this episode it took longer for her to come back to baseline. Her sugar and EKG was fine according to EMS. She reports that her neck is a little sore but her chest feels fine and she is eating okay. She did vomit everything she ate this morning as well which happens often. Her daughter reports that yesterday she engaged in more activity than normal . Yesterday morning they were out from [address] to 1000 and she was doing more walking than usual. Her appetite has been good and her Cardiologist prescribed her boost ensure however this upset her stomach. She did lose a lot of weight February 2019 after the death of her brother. She typically weighs 140 however she now weighs around 112., She sees a kidney specialists by the name of Dr. Richardson. Her PCP prescribed her a medication to prevent abdominal discomfort and the patient takes aspirin daily.      The history is provided by the patient.     Review of patient's allergies indicates:  No Known Allergies  Past Medical History:   Diagnosis Date    High cholesterol     " Hyperlipidemia     Hypertension     Syncope     Syncope and collapse      No past surgical history on file.  No family history on file.  Social History     Tobacco Use    Smoking status: Never Smoker    Smokeless tobacco: Never Used   Substance Use Topics    Alcohol use: No    Drug use: No     Review of Systems   Constitutional: Negative for fever.   HENT: Negative for sore throat.    Respiratory: Negative for shortness of breath.    Cardiovascular: Negative for chest pain.   Gastrointestinal: Positive for vomiting. Negative for nausea.   Genitourinary: Negative for dysuria.   Musculoskeletal: Positive for neck pain. Negative for back pain.   Skin: Negative for color change, rash and wound.   Neurological: Positive for syncope. Negative for weakness.   Hematological: Does not bruise/bleed easily.   All other systems reviewed and are negative.      Physical Exam     Initial Vitals [01/18/20 1122]   BP Pulse Resp Temp SpO2   134/64 72 18 97.8 °F (36.6 °C) 95 %      MAP       --         Physical Exam    Nursing note and vitals reviewed.  Constitutional: She appears cachectic. No distress.   HENT:   Head: Normocephalic and atraumatic.   Right Ear: External ear normal.   Left Ear: External ear normal.   Mouth/Throat: Mucous membranes are dry.   Eyes: Conjunctivae and EOM are normal. Pupils are equal, round, and reactive to light. Right eye exhibits no discharge. Left eye exhibits no discharge. No scleral icterus.   Neck: Normal range of motion. Neck supple.   Cardiovascular: Normal rate, regular rhythm and normal heart sounds. Exam reveals no gallop and no friction rub.    No murmur heard.  Pulmonary/Chest: Breath sounds normal. No stridor. No respiratory distress. She has no wheezes. She has no rhonchi. She has no rales.   Abdominal: Soft. Bowel sounds are normal. She exhibits no distension and no mass. There is no tenderness. There is no rebound and no guarding.   Musculoskeletal: She exhibits no edema.    Neurological: She is alert and oriented to person, place, and time. She has normal strength. No cranial nerve deficit or sensory deficit. GCS score is 15. GCS eye subscore is 4. GCS verbal subscore is 5. GCS motor subscore is 6.   Skin: Skin is warm and dry. Capillary refill takes less than 2 seconds.   Psychiatric: She has a normal mood and affect. Her behavior is normal. Judgment and thought content normal.         ED Course   Procedures  Labs Reviewed   CBC W/ AUTO DIFFERENTIAL - Abnormal; Notable for the following components:       Result Value    RBC 3.50 (*)     Hemoglobin 9.5 (*)     Hematocrit 31.2 (*)     Mean Corpuscular Hemoglobin Conc 30.4 (*)     Lymph # 0.8 (*)     Gran% 82.3 (*)     Lymph% 11.6 (*)     All other components within normal limits   COMPREHENSIVE METABOLIC PANEL - Abnormal; Notable for the following components:    Potassium 3.3 (*)     Creatinine 1.7 (*)     ALT 8 (*)     eGFR if  29 (*)     eGFR if non  25 (*)     All other components within normal limits   POCT GLUCOSE - Abnormal; Notable for the following components:    POCT Glucose 140 (*)     All other components within normal limits   TROPONIN I     EKG Readings: (Independently Interpreted)   Rhythm: Normal Sinus Rhythm. Heart Rate: 74.   LVH looks the same as September 18 2019.        Imaging Results    None          Medical Decision Making:   History:   Old Medical Records: I decided to obtain old medical records.  Initial Assessment:   This is a 89 y.o. female with a hx of recurrent syncope, thought to be due to aortic stenosis with episode of this this morning similar to previous episodes although somewhat longer before returning to normal levels of consciousness. No trauma, patient with increased activity yesterday due to doctors appointment. Plan: labs, including electrolytes, cardiac enzymes ,cdc, iv fluids.   Differential Diagnosis:   Differential Diagnosis includes, but is not limited  to:  Arrhythmia, aortic dissection, MI/unstable angina, PE, cardiogenic shock, CHF, CVA/TIA, intracranial lesion/mass, seizure, perforated viscous, ruptured AAA, orthostatic hypotension, vasovagal episode, anemia, dehydration, medication reaction, intentional overdose    Independently Interpreted Test(s):   I have ordered and independently interpreted EKG Reading(s) - see prior notes  Clinical Tests:   Lab Tests: Ordered and Reviewed  Medical Tests: Ordered and Reviewed  ED Management:  Patient remains stable throughout ED visit noted mild hypokalemia which was repleted. Patient and family desires of discharge and will follow up with cardiology.             Scribe Attestation:   Scribe #1: I performed the above scribed service and the documentation accurately describes the services I performed. I attest to the accuracy of the note.    Attending Attestation:           Physician Attestation for Scribe:  Physician Attestation Statement for Scribe #1: I, Dr. Gong, reviewed documentation, as scribed by Silvia Henry  in my presence, and it is both accurate and complete.                               Clinical Impression:     1. Syncope, unspecified syncope type    2. LOC (loss of consciousness)    3. Chest pain    4. Aortic valve stenosis, etiology of cardiac valve disease unspecified            Disposition:   Disposition: Discharged  Condition: Stable                     Lucero Gong MD  01/19/20 7857

## 2020-01-18 NOTE — ED NOTES
ROUNDING:  Lying on the stretcher on her L side. Pt resting with eyes closed. Eyes open with soft voice. Pt oriented to self/month of  and her dtr at BS. Forgetful; dtr at BS states this is her baseline. Pt pleasant and cooperative. Denies any pain, sob, cp, dizziness, lightheadedness or any other discomfort at this time. Skin is warm and dry. Resp:18 even and unlabored. Comfort and BR needs addressed. Plan of care updated. NADN. Fall precautions maintained and reinforced with family member at BS. Bed locked in low position, side rails up x2 and call light within reach. Will continue to monitor.

## 2020-01-18 NOTE — ED NOTES
NPO:   NPO status maintained and reinforced. Pt and pt's dtr verbalized understanding. Will continue to monitor.

## 2020-01-18 NOTE — ED NOTES
Patient identifiers verified and correct for Ignacio OLIVA Violette.    LOC: The patient is awake, alert and aware of environment with an appropriate affect, the patient is oriented to person only, pt's family states at baseline   APPEARANCE: Patient resting comfortably and in no acute distress, patient is clean and well groomed, patient's clothing is properly fastened.  SKIN: The skin is warm and dry, color consistent with ethnicity, patient has normal skin turgor and moist mucus membranes, skin intact, no breakdown or bruising noted.  MUSCULOSKELETAL: Patient moving all extremities spontaneously, no obvious swelling or deformities noted.  RESPIRATORY: Airway is open and patent, respirations are spontaneous, patient has a normal effort and rate, no accessory muscle use noted, bilateral breath sounds clear in all lobes.  CARDIAC: Patient has no periphreal edema noted, capillary refill < 3 seconds.  ABDOMEN: Soft and non tender to palpation, no distention noted.  NEUROLOGIC: PERRL, 2 mm bilaterally, eyes open spontaneously, behavior appropriate to situation, follows commands, facial expression symmetrical, bilateral hand grasp equal and even, purposeful motor response noted, normal sensation in all extremities when touched with a finger.

## 2020-01-18 NOTE — ED TRIAGE NOTES
"[facility] reporting x 1 episode of syncope while sitting down at table, witnessed by family. Pt's daughter states," She was siting up and then went out for a few minutes and her head just slumped down. I thought she was looking at something on the ground but then I saw she wasn't answering me and just went out". Pt awake and alert to person currently.   "

## 2020-06-19 PROBLEM — R54 AGE-RELATED PHYSICAL DEBILITY: Status: ACTIVE | Noted: 2020-01-01

## 2020-06-19 NOTE — ED NOTES
Pt's daughter called to check on pt, advised her that pt given bed assignment and awaiting RN call back to send her up stairs. Daughter wanted to know what time pt would be released tomorrow and RN advised her that we cannot say a this time because care has been transferred to Hospitalist and they would need to eval pt before any plan could be established. Advised daughter that they would have better idea in the morning. Daughter wants to call to floor tonight to check on pt, advised daughter to give pt time to get upstairs and settled in before calling as RN taking care of pt will need to get her settled and evaluated. Room # given to daughter.

## 2020-06-19 NOTE — ED NOTES
Pt arrived via EMS s/p syncope and fall per family, denies head injury but family states pt more confused than normal. Pt confused, aware of birth month, not date, unable to tell what happened prior to arrival. Pt only speaking random words. Pt placed on monitor, IV by EMS, locked bed in lowest position, rails up for safety, will closely monitor. Awaiting Md orders.

## 2020-06-19 NOTE — ED NOTES
Tried to call report to floor, RN taking report, Sanna, is currently in a room, advised she will call this RN back. Will await call back.

## 2020-06-19 NOTE — H&P
[facility] Hospital Medicine  History & Physical    Patient Name: Ignacio Oakes  MRN: [id number]  Admission Date: 6/19/2020  Attending Physician: Miguelina Gonzalez MD   Primary Care Provider: Janeen Ralph NP         Patient information was obtained from patient, relative(s), past medical records and ER records.     Subjective:     Principal Problem:Syncope    Chief Complaint:   Chief Complaint   Patient presents with    Loss of Consciousness     syncope in seat. family reports pt is altered more than normal        HPI: Ignacio Oakes is a 89 year old female with medical history of chronic diastolic heart failure with severe aortic valve stenosis, recurrent syncopal episodes and hypertension.  She presented to the [facility] ED via EMS after family reported witnessed syncopal episode prior to arrival.  The patient's daughter witnessed the event and reported that the patient stood up to go to kitchen, felt dizzy then fell.  No trauma was noted.  Per family, the patient's most recent syncopal episode was approximately three weeks ago.  Family concerned as recurrent syncopal episodes in past have not been so close together.  On arrival to the ED, the patient was found to be hypertensive with blood pressure,  200/120.  Family reports, the patient had not taken her blood pressure medications this morning.  Initial work in ED revealed elevated creatinine 1.7 that appears to be at baseline, troponin 0.018 and otherwise labs unremarkable.  Electrocardiogram without evidence of acute ischemia and chest x-ray without evidence for consolidaiton or effusion.  She will be admitted under observation for further monitoring for arrhythmia.       Past Medical History:   Diagnosis Date    High cholesterol     Hyperlipidemia     Hypertension     Syncope     Syncope and collapse        No past surgical history on file.    Review of patient's allergies indicates:  No Known Allergies    No current  facility-administered medications on file prior to encounter.      Current Outpatient Medications on File Prior to Encounter   Medication Sig    amlodipine-benazepril 10-20mg (LOTREL) 10-20 mg per capsule Take 1 capsule by mouth once daily.    aspirin (ECOTRIN) 81 MG EC tablet Take 81 mg by mouth once daily.    metoprolol succinate (TOPROL XL) 50 MG 24 hr tablet Take 50 mg by mouth once daily.    multivit-mineral-iron-lutein Tab Take by mouth.    omeprazole (PRILOSEC) 20 MG capsule TK 1 C PO QD    rosuvastatin (CRESTOR) 5 MG tablet Take 5 mg by mouth once daily.     Family History     None        Tobacco Use    Smoking status: Never Smoker    Smokeless tobacco: Never Used   Substance and Sexual Activity    Alcohol use: No    Drug use: No    Sexual activity: Not on file     Review of Systems   Unable to perform ROS: Dementia (knows name and date of birth. Can provide basic information only)   HENT: Negative for trouble swallowing.    Respiratory: Negative for shortness of breath.    Cardiovascular: Negative for chest pain.   Gastrointestinal: Negative for abdominal pain.     Objective:     Vital Signs (Most Recent):  Temp: 98.2 °F (36.8 °C) (06/19/20 1048)  Pulse: 73 (06/19/20 1445)  Resp: 15 (06/19/20 1432)  BP: (!) 160/78 (06/19/20 1443)  SpO2: 100 % (06/19/20 1442) Vital Signs (24h Range):  Temp:  [98.2 °F (36.8 °C)] 98.2 °F (36.8 °C)  Pulse:  [61-74] 73  Resp:  [15-41] 15  SpO2:  [97 %-100 %] 100 %  BP: (126-182)/(57-84) 160/78        There is no height or weight on file to calculate BMI.    Physical Exam  Vitals signs and nursing note reviewed.   Constitutional:       General: She is not in acute distress.     Appearance: She is well-developed.      Comments: Frail appearing, pleasant woman   HENT:      Head: Normocephalic and atraumatic.      Nose: Nose normal.      Mouth/Throat:      Mouth: Mucous membranes are moist.   Eyes:      Extraocular Movements: EOM normal.      Conjunctiva/sclera:  Conjunctivae normal.      Pupils: Pupils are equal, round, and reactive to light.   Neck:      Musculoskeletal: Normal range of motion and neck supple.      Trachea: No tracheal deviation.   Cardiovascular:      Rate and Rhythm: Normal rate and regular rhythm.      Pulses: Normal pulses.      Heart sounds: Murmur present. Systolic murmur present.   Pulmonary:      Effort: Pulmonary effort is normal.      Breath sounds: Examination of the right-lower field reveals decreased breath sounds. Examination of the left-lower field reveals decreased breath sounds. Decreased breath sounds present.   Abdominal:      General: Bowel sounds are normal.      Palpations: Abdomen is soft.      Tenderness: There is no abdominal tenderness.   Musculoskeletal: Normal range of motion.         General: No deformity.   Skin:     General: Skin is warm and dry.      Coloration: Skin is not pale.      Findings: No erythema or rash.   Neurological:      Mental Status: She is alert.      Cranial Nerves: No cranial nerve deficit.      Motor: No abnormal muscle tone.      Comments: The patient was alert, relaxed and cooperative with coherent thought process. Oriented to self       Psychiatric:         Attention and Perception: Attention normal.         Mood and Affect: Mood normal.         Speech: Speech normal.         Behavior: Behavior normal.         Thought Content: Thought content normal.         Cognition and Memory: Memory is impaired.          Significant Labs:   BMP:   Recent Labs   Lab 06/19/20  1127         K 4.1      CO2 19*   BUN 16   CREATININE 1.7*   CALCIUM 8.7     CBC:   Recent Labs   Lab 06/19/20  1127   WBC 5.68   HGB 10.3*   HCT 34.8*        CMP:   Recent Labs   Lab 06/19/20  1127      K 4.1      CO2 19*      BUN 16   CREATININE 1.7*   CALCIUM 8.7   PROT 6.9   ALBUMIN 3.6   BILITOT 0.5   ALKPHOS 60   AST 18   ALT 8*   ANIONGAP 13   EGFRNONAA 25*     Troponin:   Recent Labs   Lab  06/19/20  1127   TROPONINI 0.018     All pertinent labs within the past 24 hours have been reviewed.    Significant Imaging: I have reviewed all pertinent imaging results/findings within the past 24 hours.     Imaging Results          X-Ray Chest AP Portable (Final result)  Result time 06/19/20 13:48:31    Final result by Bashir Montilla MD (06/19/20 13:48:31)                 Impression:      No radiographic acute intrathoracic process seen on this single view.      Electronically signed by: Bashir Montilla MD  Date:    06/19/2020  Time:    13:48             Narrative:    EXAMINATION:  XR CHEST AP PORTABLE    CLINICAL HISTORY:  Syncope and collapse    TECHNIQUE:  Single frontal view of the chest was performed.    COMPARISON:  Chest radiograph 09/18/2019    FINDINGS:  Monitoring leads overlie the chest.  Patient is slightly rotated.    Cardiomediastinal silhouette is midline and prominent similar to prior without evidence of failure.  Few scattered linear opacities consistent with subsegmental scarring versus atelectasis.  The lungs are otherwise well expanded without focal consolidation, pleural effusion or pneumothorax.  Calcification of the arch.  No acute osseous process seen.  PA and lateral views can be obtained.                                     CRANIAL NERVES     CN III, IV, VI   Pupils are equal, round, and reactive to light.  Extraocular motions are normal.         Assessment/Plan:     * Syncope  - Uncertain etiology and suspect orthostatic hypotension, but cannot rule out possibility of arrythmia  - EKG on admission without any ST/T wave changes  - Most recent echocardiogram  With EF 65%, grade II diastolic dysfunction and severe AVS   - Check carotid ultrasound  - Check orthostatic vitals  - toContinue to monitor on telemetry    - Gentle IVF  - PT/OT consult         Age-related physical debility  - Frequent falls related to syncope  - PT/OT   - Palliative care for advanced directives planning  - Fall  precautions      Nonrheumatic aortic valve stenosis  - Most recent echocardiogram 3/2019 with Severe aortic valve stenosis; Normal EF, low stroke volume   - Repeat echo     CKD (chronic kidney disease) stage 4, GFR 15-29 ml/min  - Baseline creatine 1.6-2.0; on admission Cr 1.7 an appears stable at baseline   - Monitor intake/output    Hyperlipidemia  - Continue home meds: Crestor 5 mg QD   - Lipid panel for AM       Hypertension  - Hypertensive on arrival to ED; states no blood pressure medication prior to ED arrival  - Will continue home medications: Toprol XL 50 mg QD, benazapril 20 mg QD, amlodipine 10 mg QD   - Monitor         VTE Risk Mitigation (From admission, onward)         Ordered     IP VTE HIGH RISK PATIENT  Once      06/19/20 1816     Place sequential compression device  Until discontinued      06/19/20 1816                   Lucero Camacho NP  Department of Hospital Medicine   [facility]

## 2020-06-19 NOTE — ED NOTES
"PT'S DAUGHTER, CELINE CALLED TO CHECK ON PT. PER DAUGHTER, PT "HAS HX OF VALUE PROBLEM, WITH IT OPENING AND CLOSING, IT GETS STUCK AND SHE PASSES OUT. SHE USUALLY COMES RIGHT BACK, THIS TIME SHE DIDN'T COME BACK LIKE SHE USED TO." DAUGHTER STATES THIS ISSUE "USUALLY HAPPENS 2-3 TIMES A YEAR, BUT IT HAPPENED 3 WEEKS AGO AND AGAIN TODAY". DAUGHTER ALSO STATES THAT PT ONLY TAKES HER HTN MED WHEN HER BP IS ELEVATED. SHE STATES YESTERDAY BP /65 , SO SHE DIDN'T TAKE MED. BUT TODAY BP WAS ELEVATED AND TOOK MED. DAUGHTER ALSO STATES THAT CONFUSION IS PT'S BASELINE, NO CHANGES AFTER SYNCOPAL EPISODE TODAY.     OTHER DAUGHTER KY MILLER [phone]  "

## 2020-06-19 NOTE — ASSESSMENT & PLAN NOTE
- Baseline creatine 1.6-2.0; on admission Cr 1.7 an appears stable at baseline   - Monitor intake/output

## 2020-06-19 NOTE — SUBJECTIVE & OBJECTIVE
Past Medical History:   Diagnosis Date    High cholesterol     Hyperlipidemia     Hypertension     Syncope     Syncope and collapse        No past surgical history on file.    Review of patient's allergies indicates:  No Known Allergies    No current facility-administered medications on file prior to encounter.      Current Outpatient Medications on File Prior to Encounter   Medication Sig    amlodipine-benazepril 10-20mg (LOTREL) 10-20 mg per capsule Take 1 capsule by mouth once daily.    aspirin (ECOTRIN) 81 MG EC tablet Take 81 mg by mouth once daily.    metoprolol succinate (TOPROL XL) 50 MG 24 hr tablet Take 50 mg by mouth once daily.    multivit-mineral-iron-lutein Tab Take by mouth.    omeprazole (PRILOSEC) 20 MG capsule TK 1 C PO QD    rosuvastatin (CRESTOR) 5 MG tablet Take 5 mg by mouth once daily.     Family History     None        Tobacco Use    Smoking status: Never Smoker    Smokeless tobacco: Never Used   Substance and Sexual Activity    Alcohol use: No    Drug use: No    Sexual activity: Not on file     Review of Systems   Unable to perform ROS: Dementia (knows name and date of birth. Can provide basic information only)   HENT: Negative for trouble swallowing.    Respiratory: Negative for shortness of breath.    Cardiovascular: Negative for chest pain.   Gastrointestinal: Negative for abdominal pain.     Objective:     Vital Signs (Most Recent):  Temp: 98.2 °F (36.8 °C) (06/19/20 1048)  Pulse: 73 (06/19/20 1445)  Resp: 15 (06/19/20 1432)  BP: (!) 160/78 (06/19/20 1443)  SpO2: 100 % (06/19/20 1442) Vital Signs (24h Range):  Temp:  [98.2 °F (36.8 °C)] 98.2 °F (36.8 °C)  Pulse:  [61-74] 73  Resp:  [15-41] 15  SpO2:  [97 %-100 %] 100 %  BP: (126-182)/(57-84) 160/78        There is no height or weight on file to calculate BMI.    Physical Exam  Vitals signs and nursing note reviewed.   Constitutional:       General: She is not in acute distress.     Appearance: She is well-developed.       Comments: Frail appearing, pleasant woman   HENT:      Head: Normocephalic and atraumatic.      Nose: Nose normal.      Mouth/Throat:      Mouth: Mucous membranes are moist.   Eyes:      Extraocular Movements: EOM normal.      Conjunctiva/sclera: Conjunctivae normal.      Pupils: Pupils are equal, round, and reactive to light.   Neck:      Musculoskeletal: Normal range of motion and neck supple.      Trachea: No tracheal deviation.   Cardiovascular:      Rate and Rhythm: Normal rate and regular rhythm.      Pulses: Normal pulses.      Heart sounds: Murmur present. Systolic murmur present.   Pulmonary:      Effort: Pulmonary effort is normal.      Breath sounds: Examination of the right-lower field reveals decreased breath sounds. Examination of the left-lower field reveals decreased breath sounds. Decreased breath sounds present.   Abdominal:      General: Bowel sounds are normal.      Palpations: Abdomen is soft.      Tenderness: There is no abdominal tenderness.   Musculoskeletal: Normal range of motion.         General: No deformity.   Skin:     General: Skin is warm and dry.      Coloration: Skin is not pale.      Findings: No erythema or rash.   Neurological:      Mental Status: She is alert.      Cranial Nerves: No cranial nerve deficit.      Motor: No abnormal muscle tone.      Comments: The patient was alert, relaxed and cooperative with coherent thought process. Oriented to self       Psychiatric:         Attention and Perception: Attention normal.         Mood and Affect: Mood normal.         Speech: Speech normal.         Behavior: Behavior normal.         Thought Content: Thought content normal.         Cognition and Memory: Memory is impaired.          Significant Labs:   BMP:   Recent Labs   Lab 06/19/20  1127         K 4.1      CO2 19*   BUN 16   CREATININE 1.7*   CALCIUM 8.7     CBC:   Recent Labs   Lab 06/19/20  1127   WBC 5.68   HGB 10.3*   HCT 34.8*        CMP:   Recent  Labs   Lab 06/19/20  1127      K 4.1      CO2 19*      BUN 16   CREATININE 1.7*   CALCIUM 8.7   PROT 6.9   ALBUMIN 3.6   BILITOT 0.5   ALKPHOS 60   AST 18   ALT 8*   ANIONGAP 13   EGFRNONAA 25*     Troponin:   Recent Labs   Lab 06/19/20  1127   TROPONINI 0.018     All pertinent labs within the past 24 hours have been reviewed.    Significant Imaging: I have reviewed all pertinent imaging results/findings within the past 24 hours.     Imaging Results          X-Ray Chest AP Portable (Final result)  Result time 06/19/20 13:48:31    Final result by Bashir Montilla MD (06/19/20 13:48:31)                 Impression:      No radiographic acute intrathoracic process seen on this single view.      Electronically signed by: Bashir Montilla MD  Date:    06/19/2020  Time:    13:48             Narrative:    EXAMINATION:  XR CHEST AP PORTABLE    CLINICAL HISTORY:  Syncope and collapse    TECHNIQUE:  Single frontal view of the chest was performed.    COMPARISON:  Chest radiograph 09/18/2019    FINDINGS:  Monitoring leads overlie the chest.  Patient is slightly rotated.    Cardiomediastinal silhouette is midline and prominent similar to prior without evidence of failure.  Few scattered linear opacities consistent with subsegmental scarring versus atelectasis.  The lungs are otherwise well expanded without focal consolidation, pleural effusion or pneumothorax.  Calcification of the arch.  No acute osseous process seen.  PA and lateral views can be obtained.                                     CRANIAL NERVES     CN III, IV, VI   Pupils are equal, round, and reactive to light.  Extraocular motions are normal.

## 2020-06-19 NOTE — ED PROVIDER NOTES
Encounter Date: 6/19/2020    SCRIBE #1 NOTE: I, Elizabeth Ye, am scribing for, and in the presence of, Dr. Abbott.       History     Chief Complaint   Patient presents with    Loss of Consciousness     syncope in seat. family reports pt is altered more than normal     Time seen by provider: 12:50 PM    This is a 89 y.o. female with a history of HTN and aortic stenosis who presents via EMS due to witnessed syncope without trauma prior to arrival. Family (daughter who provided all of history) reports that she got up and walked to the kitchen, started feeling dizzy, and passed out for a few minutes with no head trauma. Her family woke her up and by the time the ambulance arrived, she was starting to respond. Her family is concerned because she passed out 3 weeks ago and her episodes of syncope don't usually happen this frequently.  Also, her blood pressure was significantly elevated when checked after the episode, of to 200/120 when it is usually 100s over 70s.  She did not take her BP med today since they usually give it only if it is elevated on BP check in the morning. Family adds that she has a chronically poor appetite. They deny additional complaints at this time.   Patient denies any chest pain, SOB, or other complaints    The history is provided by the patient and a relative.     Review of patient's allergies indicates:  No Known Allergies  Past Medical History:   Diagnosis Date    High cholesterol     Hyperlipidemia     Hypertension     Syncope     Syncope and collapse      No past surgical history on file.  No family history on file.  Social History     Tobacco Use    Smoking status: Never Smoker    Smokeless tobacco: Never Used   Substance Use Topics    Alcohol use: No    Drug use: No     Review of Systems   Constitutional: Positive for appetite change (chronically low appetite). Negative for fever.   HENT: Negative for sore throat.    Eyes: Negative for photophobia.   Respiratory: Negative  for shortness of breath.    Cardiovascular: Negative for chest pain.   Gastrointestinal: Negative for nausea.   Musculoskeletal: Negative for back pain.   Skin: Negative for rash and wound.   Neurological: Positive for dizziness and syncope.   Hematological: Does not bruise/bleed easily.       Physical Exam     Initial Vitals [06/19/20 1048]   BP Pulse Resp Temp SpO2   (!) 171/81 74 17 98.2 °F (36.8 °C) 97 %      MAP       --         Physical Exam    Nursing note and vitals reviewed.  Constitutional: She appears well-developed and well-nourished.   HENT:   Head: Normocephalic and atraumatic.   Eyes: EOM are normal.   Neck: Normal range of motion.   Cardiovascular: Normal rate, regular rhythm and intact distal pulses.   Murmur (systolic) heard.  Pulmonary/Chest: Breath sounds normal. No respiratory distress. She has no wheezes. She has no rales.   Abdominal: Soft. There is no abdominal tenderness. There is no rebound and no guarding.   Musculoskeletal: Normal range of motion. No edema.   Neurological: She is alert.   Skin: Skin is warm and dry. Capillary refill takes less than 2 seconds. No rash noted.         ED Course   Procedures  Labs Reviewed   COMPREHENSIVE METABOLIC PANEL - Abnormal; Notable for the following components:       Result Value    CO2 19 (*)     Creatinine 1.7 (*)     ALT 8 (*)     eGFR if  29 (*)     eGFR if non  25 (*)     All other components within normal limits   CBC W/ AUTO DIFFERENTIAL - Abnormal; Notable for the following components:    RBC 3.67 (*)     Hemoglobin 10.3 (*)     Hematocrit 34.8 (*)     Mean Corpuscular Hemoglobin Conc 29.6 (*)     Gran% 74.5 (*)     Lymph% 17.4 (*)     All other components within normal limits   URINALYSIS, REFLEX TO URINE CULTURE - Abnormal; Notable for the following components:    Occult Blood UA Trace (*)     All other components within normal limits    Narrative:     Preferred Collection Type->Urine, Clean Catch  Specimen  Source->Urine   TROPONIN I   SARS-COV-2 RNA AMPLIFICATION, QUAL     EKG Readings: (Independently Interpreted)   Normal sinus rhythm. Rate of 69. Poor baseline. LVH. No significant change.       Imaging Results          X-Ray Chest AP Portable (Final result)  Result time 06/19/20 13:48:31    Final result by Bashir Montilla MD (06/19/20 13:48:31)                 Impression:      No radiographic acute intrathoracic process seen on this single view.      Electronically signed by: Bashir Montilla MD  Date:    06/19/2020  Time:    13:48             Narrative:    EXAMINATION:  XR CHEST AP PORTABLE    CLINICAL HISTORY:  Syncope and collapse    TECHNIQUE:  Single frontal view of the chest was performed.    COMPARISON:  Chest radiograph 09/18/2019    FINDINGS:  Monitoring leads overlie the chest.  Patient is slightly rotated.    Cardiomediastinal silhouette is midline and prominent similar to prior without evidence of failure.  Few scattered linear opacities consistent with subsegmental scarring versus atelectasis.  The lungs are otherwise well expanded without focal consolidation, pleural effusion or pneumothorax.  Calcification of the arch.  No acute osseous process seen.  PA and lateral views can be obtained.                                 Medical Decision Making:   History:   Old Medical Records: I decided to obtain old medical records.  Initial Assessment:       89-year-old female with history of HTN, aortic stenosis presents by EMS after episode of witnessed syncope.  Daughter provided most of history and stated that patient has frequent episodes of syncope, most recently 3 weeks ago.  She was walking without assistance from her room to the kitchen after waking up this morning, when she was noted to look dazed and felt dizzy.  She then had syncope without any head or other trauma and was unresponsive for a few minutes.  Per daughters, this is similar to her previous episodes of syncope but it lasted longer than usual and  it does not usually occur this frequently after last episode.  In addition, they were concerned because her blood pressure after episode was 200/120, which is significantly higher than usual baseline of 100/70.  She did not take her BP meds yet today; they usually give it to her only if her BP is elevated on morning check.  Patient denies any current complaints and is unsure of the events that led her to the hospital.  She admits to chronically poor appetite but denies any CP/SOB/palpitations, and no headache or extremity weakness.   Exam with no acute findings except for murmur consistent with aortic stenosis.      Syncopal episode could be due to aortic stenosis and exertion; less likely orthostatic hypotension from chronic dehydration since BP was elevated, though it was only checked after syncopal episode not before.  Less likely transient arrhythmia or ACS.  No evidence CVA or ICH, and patient afebrile with no sign of infectious etiology.   Basic labs with no acute findings, with baseline CKD and no sign of UTI and normal troponin.  EKG with no sign of acute ischemia or arrhythmia.  Patient remained at baseline while in ED with BP improved from 170/80 on arrival to 150/80 without giving BP meds.     Per chart review, patient not surgical candidate for aortic valve replacement or TAVR.  I discussed disposition with her family, as she has been discharged after similar previous ED visits for syncope.  However, family does not feel comfortable taking her home now since this episode was more frequent and more severe than previous.  Will admit on telemetry to hospitalist for further observation and management.      Independently Interpreted Test(s):   I have ordered and independently interpreted X-rays - see prior notes.  I have ordered and independently interpreted EKG Reading(s) - see prior notes  Clinical Tests:   Lab Tests: Ordered and Reviewed  Radiological Study: Ordered and Reviewed  Medical Tests: Ordered and  Reviewed            Scribe Attestation:   Scribe #1: I performed the above scribed service and the documentation accurately describes the services I performed. I attest to the accuracy of the note.    Attending Attestation:           Physician Attestation for Scribe:  Physician Attestation Statement for Scribe #1: I, Dr. Abbott, reviewed documentation, as scribed by Elizabeth Ye in my presence, and it is both accurate and complete.                               Clinical Impression:     1. Nonrheumatic aortic valve stenosis    2. Syncope                ED Disposition Condition    Observation                           Carroll Abbott MD  06/19/20 8748

## 2020-06-19 NOTE — HPI
Ignacio Oakes is a 89 year old female with medical history of chronic diastolic heart failure with severe aortic valve stenosis, recurrent syncopal episodes and hypertension.  She presented to the [facility] ED via EMS after family reported witnessed near-syncopal episode while seated  prior to arrival.  The patient's daughter witnessed the event and reported that the patient was seated when daughter noted patient  Per family, the patient's most recent syncopal episode was approximately three weeks ago.  Family concerned as recurrent syncopal episodes in past have not been so close together.  On arrival to the ED, the patient was found to be hypertensive with blood pressure,  200/120.  Family reports, the patient had not taken her blood pressure medications this morning.  Initial work in ED revealed elevated creatinine 1.7 that appears to be at baseline, troponin 0.018 and otherwise labs unremarkable.  Electrocardiogram without evidence of acute ischemia and chest x-ray without evidence for consolidaiton or effusion.  She will be admitted under observation for further monitoring for arrhythmia.

## 2020-06-19 NOTE — ASSESSMENT & PLAN NOTE
- Most recent echocardiogram 3/2019 with Severe aortic valve stenosis; Normal EF, low stroke volume   - Repeat echo

## 2020-06-19 NOTE — ASSESSMENT & PLAN NOTE
- Uncertain etiology and suspect orthostatic hypotension, but cannot rule out possibility of arrythmia  - EKG on admission without any ST/T wave changes  - Most recent echocardiogram  With EF 65%, grade II diastolic dysfunction and severe AVS   - Check carotid ultrasound  - Check orthostatic vitals  - toContinue to monitor on telemetry    - Gentle IVF  - PT/OT consult

## 2020-06-20 PROBLEM — I95.1 SYNCOPE DUE TO ORTHOSTATIC HYPOTENSION: Status: ACTIVE | Noted: 2017-03-05

## 2020-06-20 NOTE — PROGRESS NOTES
Patient arrived to floor at approximately 1815 via stretcher with one attendant. AAOx1, pleasant affect. Denies pain, discomfort. Telemetry monitor placed per order and patient oriented to call bell use. Bed alarm in place and bed in lowest position. NP Lucero made aware of patient's arrival to floor and of elevated BP with admission VS. Nursing will continue to monitor. All safety precautions in place. Report given to oncoming nurse.

## 2020-06-20 NOTE — PLAN OF CARE
OT evaluation completed with treatment to follow.  Recommend discharge home with resumption of 24 hour family assist.  DME: None at present.  Krystal Boyd, Rhonda, LOTR 6/20/2020

## 2020-06-20 NOTE — PLAN OF CARE
Plan of care reviewed with pt.  Patient oriented only to self at this time, sometimes to place.  Patient continues to try to get out of bed independently, Avasys in use and bed alarm set.  Pt swallows pills whole with water but had poor appetite this shift.  Pt repositions independently.  Pt pulled out multiple IVs, no IV access at this time, Lucero Camacho, IVETTE aware.  Purposeful rounding completed.  Will continue to monitor.

## 2020-06-20 NOTE — PLAN OF CARE
CM called pt's daughter, Joseph Oakes, [phone], for assessment information.    Daughter confirmed demographic information is correct in Epic. Pt goes to [facility] for PCP care. Pharmacy of choice is [facility].    Pt has a cane and RW at home, daughters states she does not use DME.    Pt is not on HD and does not take coumadin. Pt with no POA or LW.    Daughters are undecided about whether they want HH, will decide when discharged.    CM to follow for plans and arrangements.   06/20/20 5808   Discharge Assessment   Assessment Type Discharge Planning Assessment   Confirmed/corrected address and phone number on facesheet? Yes   Assessment information obtained from? Caregiver;Medical Record   Expected Length of Stay (days) 3   Communicated expected length of stay with patient/caregiver yes   Prior to hospitilization cognitive status: Not Oriented to Place;Not Oriented to Person;Not Oriented to Time   Prior to hospitalization functional status: Independent;Assistive Equipment;Needs Assistance;Partially Dependent   Current cognitive status: Not Oriented to Place;Not Oriented to Person;Not Oriented to Time   Current Functional Status: Needs Assistance;Partially Dependent;Assistive Equipment   Lives With child(tory), adult   Able to Return to Prior Arrangements yes   Is patient able to care for self after discharge? No  (Family assists)   Who are your caregiver(s) and their phone number(s)? Janki Oakes, daughter, [phone],  jayy Oakes, daughter, [phone]   Readmission Within the Last 30 Days no previous admission in last 30 days   Patient currently being followed by outpatient case management? No   Equipment Currently Used at Home none   Do you have any problems affording any of your prescribed medications? No   Is the patient taking medications as prescribed? yes   Does the patient have transportation home? Yes   Transportation Anticipated family or friend will provide    Discharge Plan A Home   Discharge Plan B Home Health   DME Needed Upon Discharge  none   Patient/Family in Agreement with Plan yes

## 2020-06-20 NOTE — PT/OT/SLP EVAL
Occupational Therapy   Evaluation    Name: Ignacio Oakes  MRN: [id number]  Admitting Diagnosis:  Syncope due to orthostatic hypotension      Recommendations:     Discharge Recommendations: home(with 24 hour family care)  Discharge Equipment Recommendations:  none  Barriers to discharge:  None    Assessment:     Ignacio Oakes is a 89 y.o. female with a medical diagnosis of Syncope due to orthostatic hypotension.  She presents with confusion.. Performance deficits affecting function: impaired self care skills, impaired functional mobilty, gait instability, impaired balance, impaired cognition, decreased lower extremity function, decreased safety awareness, decreased upper extremity function, impaired fine motor.  Pt was distractible, confused, poor immediate memory and difficult to orient to task.  She refused to try to eat food, insisting the food was not hers, Min A G/H, Min A UBD, SBA LBD with need for constant need for redirection to stay on task.  OT treatment is needed to maximize patient function while in the hospital.     Rehab Prognosis: Good; patient would benefit from acute skilled OT services to address these deficits and reach maximum level of function.       Plan:     Patient to be seen 5 x/week to address the above listed problems via self-care/home management, cognitive retraining, therapeutic activities, therapeutic exercises  · Plan of Care Expires: 07/20/20  · Plan of Care Reviewed with: patient    Subjective     Chief Complaint: none, repeatedly asked if I lived in this place (uncertain what this place was)  Patient/Family Comments/goals: not stated    Occupational Profile: (phone conversation: Marylu Oakes-pt's daughter)  Living Environment: lives with two daughters in a single story house witn no NESSA at entry  Previous level of function: ambulates holding the walls, refuses to use SC and rollator, SBA self-care (Total A bathing, Min A G/H (brush hair)  Roles and Routines: read  "newspaper  Equipment Used at Home:  cane, straight, rollator, wheelchair(very deep tub-not used for pt bathing due to safety / transport W/C) Refused to use all DME  Assistance upon Discharge: pt's 2 daughters provide full time care, son assist with transportation if needed    Pain/Comfort:  Pain Rating 1: (UT: no pain reactions noted)  Pain Rating Post-Intervention 1: (UT: no pain reactions noted)    Patients cultural, spiritual, Episcopal conflicts given the current situation: no    Objective:     Communicated with: patient and her nurse prior to session.  Patient found sitting EOB with telemetry, peripheral IV upon OT entry to room.  She was agreeable to the OT evaluation, not understanding who I was or why I was present.    General Precautions: Standard, fall, hearing impaired   Orthopedic Precautions:N/A   Braces: N/A     Occupational Performance:    Bed Mobility:    · SBA sit>supine    Functional Mobility/Transfers:  · Min A (HHA) sit><stand  · Min A bed><BSC transfer (HHA)    Activities of Daily Living:  · Refused to eat food, stating that the food was not her, "someone put it here"  · Min A G/H (wash mouth and hands)-poor task sequencing and memory  · Min A UBD (don hospital gown as robe)- delighted with the gift-matched the gown she was wearing  · SBA LBD (doff/don socks) sitting EOB-aware the socks were not hers, delighted with having them  · Declined BSC use    Cognitive/Visual Perceptual:  Cognitive/Psychosocial Skills:     -       Oriented to: Person   -       Follows Commands/attention:Easily distracted, Follows one-step commands and < 20%  -       Communication: clear/fluent  -       Memory: Impaired STM, Impaired LTM and Poor immediate recall  -       Safety awareness/insight to disability: impaired   -       Mood/Affect/Coping skills/emotional control: Appropriate to situation  Visual/Perceptual:      -Impaired  acuity and glasses at home, [facility]     Physical Exam:  Balance:    -       sitting balance " Fair + (falls bacwards when reaching arms up in the air, mild balance and gait instability with bed><BSC transfer  Postural examination/scapula alignment:    -       Rounded shoulders  -       Forward head  -       Kyphosis  Skin integrity: Visible skin intact  Edema:  None noted  Sensation:    -       Intact for light touch both hands  Motor Planning:    -       Poor  Dominant hand:    -       Right  UE ROM: shoulder flexion-ABD ~50* BUE  UE Strength: grossly 3/5 BUE  Hand Function: fair grasp and dexterity for task performed    AMPA 6 Click ADL:  AMPA Total Score: 14    Patient left supine with all lines intact, call button in reach, bed alarm on and nurse notified    GOALS:   Multidisciplinary Problems     Occupational Therapy Goals        Problem: Occupational Therapy Goal    Goal Priority Disciplines Outcome Interventions   Occupational Therapy Goal     OT, PT/OT     Description: Goals to be met by 6/29/20    Eat 50% of food during a meal with Min A to stay on task  G/H (mouth wash, wash face and hands) with SBA  UBD SBA unsupported sitting  LBD (briefs and socks) with Min A  Toilet hygiene at [facility] or toilet with Min A  Follow one-step commands 50% with Min A manual cues                   History:     Past Medical History:   Diagnosis Date    High cholesterol     Hyperlipidemia     Hypertension     Syncope     Syncope and collapse        History reviewed. No pertinent surgical history.    Time Tracking:     OT Date of Treatment: 06/20/20  OT Start Time: 1417  OT Stop Time: 1442  OT Total Time (min): 25 min    Billable Minutes:Evaluation 25    Krystal Boyd, Rhonda, LOTR  6/20/2020

## 2020-06-20 NOTE — SUBJECTIVE & OBJECTIVE
Interval History: No events. Othostatic hypotension.  Gentle IVF for now.   Echo pending and Cardiology consulted.  Fall precautions.       Review of Systems   Unable to perform ROS: Dementia (knows name and date of birth. Can provide basic information only)   HENT: Negative for trouble swallowing.    Respiratory: Negative for shortness of breath.    Cardiovascular: Negative for chest pain.   Gastrointestinal: Negative for abdominal pain.     Objective:     Vital Signs (Most Recent):  Temp: 97.9 °F (36.6 °C) (06/20/20 1501)  Pulse: 69 (06/20/20 1501)  Resp: 14 (06/20/20 1501)  BP: (!) 156/72 (06/20/20 1501)  SpO2: (!) 94 % (06/20/20 1501) Vital Signs (24h Range):  Temp:  [97.4 °F (36.3 °C)-98.5 °F (36.9 °C)] 97.9 °F (36.6 °C)  Pulse:  [] 69  Resp:  [14-20] 14  SpO2:  [91 %-100 %] 94 %  BP: (123-217)/() 156/72     Weight: 48.3 kg (106 lb 7.7 oz)  Body mass index is 19.48 kg/m².    Intake/Output Summary (Last 24 hours) at 6/20/2020 1730  Last data filed at 6/20/2020 1500  Gross per 24 hour   Intake 473.75 ml   Output 500 ml   Net -26.25 ml      Physical Exam  Vitals signs and nursing note reviewed.   Constitutional:       General: She is not in acute distress.     Appearance: She is well-developed.      Comments: Frail appearing, pleasant woman   HENT:      Head: Normocephalic and atraumatic.      Nose: Nose normal.      Mouth/Throat:      Mouth: Mucous membranes are moist.   Eyes:      Conjunctiva/sclera: Conjunctivae normal.      Pupils: Pupils are equal, round, and reactive to light.   Neck:      Musculoskeletal: Normal range of motion and neck supple.      Trachea: No tracheal deviation.   Cardiovascular:      Rate and Rhythm: Normal rate and regular rhythm.      Pulses: Normal pulses.      Heart sounds: Murmur present. Systolic murmur present.   Pulmonary:      Effort: Pulmonary effort is normal.      Breath sounds: Examination of the right-lower field reveals decreased breath sounds. Examination of  the left-lower field reveals decreased breath sounds. Decreased breath sounds present.   Abdominal:      General: Bowel sounds are normal.      Palpations: Abdomen is soft.      Tenderness: There is no abdominal tenderness.   Musculoskeletal: Normal range of motion.         General: No deformity.   Skin:     General: Skin is warm and dry.      Coloration: Skin is not pale.      Findings: No erythema or rash.   Neurological:      Mental Status: She is alert.      Cranial Nerves: No cranial nerve deficit.      Motor: No abnormal muscle tone.      Comments: The patient was alert, relaxed and cooperative with coherent thought process. Oriented to self       Psychiatric:         Attention and Perception: Attention normal.         Mood and Affect: Mood normal.         Speech: Speech normal.         Behavior: Behavior normal.         Thought Content: Thought content normal.         Cognition and Memory: Memory is impaired.         Significant Labs:   CBC:   Recent Labs   Lab 06/19/20  1127 06/20/20  0435   WBC 5.68 6.20   HGB 10.3* 9.8*   HCT 34.8* 32.2*    131*     CMP:   Recent Labs   Lab 06/19/20 1127 06/20/20  0435    139   K 4.1 4.3    107   CO2 19* 19*    76   BUN 16 17   CREATININE 1.7* 1.5*   CALCIUM 8.7 8.9   PROT 6.9  --    ALBUMIN 3.6  --    BILITOT 0.5  --    ALKPHOS 60  --    AST 18  --    ALT 8*  --    ANIONGAP 13 13   EGFRNONAA 25* 30*     Urine Studies:   Recent Labs   Lab 06/19/20  1128   COLORU Yellow   APPEARANCEUA Clear   PHUR 6.0   SPECGRAV 1.020   PROTEINUA Negative   GLUCUA Negative   KETONESU Negative   BILIRUBINUA Negative   OCCULTUA Trace*   NITRITE Negative   UROBILINOGEN Negative   LEUKOCYTESUR Negative     All pertinent labs within the past 24 hours have been reviewed.    Significant Imaging: I have reviewed all pertinent imaging results/findings within the past 24 hours.

## 2020-06-20 NOTE — ASSESSMENT & PLAN NOTE
- Frequent falls related to syncope  - PT/OT   - Palliative care for advanced directives planning  - Fall precautions

## 2020-06-20 NOTE — NURSING
Pt has pulled out 2 IV's this morning. Per Lucero Rain NP okay to not have IV access at this time.

## 2020-06-20 NOTE — ASSESSMENT & PLAN NOTE
- Uncertain etiology and suspect orthostatic hypotension, but cannot rule out possibility of arrythmia  - EKG on admission without any ST/T wave changes  - Most recent echocardiogram  With EF 65%, grade II diastolic dysfunction and severe AVS   - Check carotid ultrasound  - Orthostatic this am, gentle IVF, orthostatics [id number]   - toContinue to monitor on telemetry    - Gentle IVF  - PT/OT consult

## 2020-06-20 NOTE — PLAN OF CARE
Problem: Adult Inpatient Plan of Care  Goal: Plan of Care Review  6/20/2020 0508 by Etelvina Wooten RN  Outcome: Ongoing, Progressing  Flowsheets (Taken 6/20/2020 0508)  Plan of Care Reviewed With:   patient   daughter  6/20/2020 0505 by Etelvina Wooten RN  Outcome: Ongoing, Progressing   SR on telemetry. +Orthostatics. IVFs ordered. Pt more oriented now, disoriented to time and situation (previously only oriented to self). Patient and daughters updated on POC.    Problem: Fall Injury Risk  Goal: Absence of Fall and Fall-Related Injury  6/20/2020 0508 by Etelvina Wooten RN  Outcome: Ongoing, Progressing  6/20/2020 0505 by Etelvina Wooten RN  Outcome: Ongoing, Progressing  Intervention: Identify and Manage Contributors to Fall Injury Risk  Flowsheets (Taken 6/20/2020 0508)  Self-Care Promotion: independence encouraged  Medication Review/Management: medications reviewed  Intervention: Promote Injury-Free Environment  Flowsheets (Taken 6/20/2020 0508)  Safety Promotion/Fall Prevention: orthostatic vital signs  Environmental Safety Modification:   assistive device/personal items within reach   room near unit station   room organization consistent   lighting adjusted   clutter free environment maintained

## 2020-06-20 NOTE — PLAN OF CARE
Problem: Physical Therapy Goal  Goal: Physical Therapy Goal  Description: Goals to be met by: 7/20/2020    Patient will perform the following to increase strength, improve mobility, and return to prior level of function:    1. Supine <> sit with SBA.  2. Sit<>stand with SBA with least restrictive assistive device.  3. Gait x 200 feet with SBA with least restrictive assistive device.  Outcome: Ongoing, Progressing     PT orders received. PT evaluation completed and goals/POC established. Pt tolerated evaluation well with no adverse reactions. Pt performed supine <> sit with min A, sit <> stand with min A, and ambulation x 40 ft with rolling walker and min A. PT will continue to follow and progress goals as tolerated. Recommend discharge to home with HHPT/OT and 24 hour supervision/assist.

## 2020-06-20 NOTE — PROGRESS NOTES
[facility] Hospital Medicine  Progress Note    Patient Name: Ignacio Oakes  MRN: [id number]  Patient Class: OP- Observation   Admission Date: 6/19/2020  Length of Stay: 0 days  Attending Physician: Miguelina Gonzalez MD  Primary Care Provider: Janeen Ralph NP        Subjective:     Principal Problem:Syncope due to orthostatic hypotension        HPI:  Ignacio Oakes is a 89 year old female with medical history of chronic diastolic heart failure with severe aortic valve stenosis, recurrent syncopal episodes and hypertension.  She presented to the [facility] ED via EMS after family reported witnessed near-syncopal episode while seated  prior to arrival.  The patient's daughter witnessed the event and reported that the patient was seated when daughter noted patient  Per family, the patient's most recent syncopal episode was approximately three weeks ago.  Family concerned as recurrent syncopal episodes in past have not been so close together.  On arrival to the ED, the patient was found to be hypertensive with blood pressure,  200/120.  Family reports, the patient had not taken her blood pressure medications this morning.  Initial work in ED revealed elevated creatinine 1.7 that appears to be at baseline, troponin 0.018 and otherwise labs unremarkable.  Electrocardiogram without evidence of acute ischemia and chest x-ray without evidence for consolidaiton or effusion.  She will be admitted under observation for further monitoring for arrhythmia.           Interval History: No events. Othostatic hypotension.  Gentle IVF for now.   Echo pending and Cardiology consulted.  Fall precautions.       Review of Systems   Unable to perform ROS: Dementia (knows name and date of birth. Can provide basic information only)   HENT: Negative for trouble swallowing.    Respiratory: Negative for shortness of breath.    Cardiovascular: Negative for chest pain.   Gastrointestinal: Negative for abdominal pain.      Objective:     Vital Signs (Most Recent):  Temp: 97.9 °F (36.6 °C) (06/20/20 1501)  Pulse: 69 (06/20/20 1501)  Resp: 14 (06/20/20 1501)  BP: (!) 156/72 (06/20/20 1501)  SpO2: (!) 94 % (06/20/20 1501) Vital Signs (24h Range):  Temp:  [97.4 °F (36.3 °C)-98.5 °F (36.9 °C)] 97.9 °F (36.6 °C)  Pulse:  [] 69  Resp:  [14-20] 14  SpO2:  [91 %-100 %] 94 %  BP: (123-217)/() 156/72     Weight: 48.3 kg (106 lb 7.7 oz)  Body mass index is 19.48 kg/m².    Intake/Output Summary (Last 24 hours) at 6/20/2020 1730  Last data filed at 6/20/2020 1500  Gross per 24 hour   Intake 473.75 ml   Output 500 ml   Net -26.25 ml      Physical Exam  Vitals signs and nursing note reviewed.   Constitutional:       General: She is not in acute distress.     Appearance: She is well-developed.      Comments: Frail appearing, pleasant woman   HENT:      Head: Normocephalic and atraumatic.      Nose: Nose normal.      Mouth/Throat:      Mouth: Mucous membranes are moist.   Eyes:      Conjunctiva/sclera: Conjunctivae normal.      Pupils: Pupils are equal, round, and reactive to light.   Neck:      Musculoskeletal: Normal range of motion and neck supple.      Trachea: No tracheal deviation.   Cardiovascular:      Rate and Rhythm: Normal rate and regular rhythm.      Pulses: Normal pulses.      Heart sounds: Murmur present. Systolic murmur present.   Pulmonary:      Effort: Pulmonary effort is normal.      Breath sounds: Examination of the right-lower field reveals decreased breath sounds. Examination of the left-lower field reveals decreased breath sounds. Decreased breath sounds present.   Abdominal:      General: Bowel sounds are normal.      Palpations: Abdomen is soft.      Tenderness: There is no abdominal tenderness.   Musculoskeletal: Normal range of motion.         General: No deformity.   Skin:     General: Skin is warm and dry.      Coloration: Skin is not pale.      Findings: No erythema or rash.   Neurological:      Mental  Status: She is alert.      Cranial Nerves: No cranial nerve deficit.      Motor: No abnormal muscle tone.      Comments: The patient was alert, relaxed and cooperative with coherent thought process. Oriented to self       Psychiatric:         Attention and Perception: Attention normal.         Mood and Affect: Mood normal.         Speech: Speech normal.         Behavior: Behavior normal.         Thought Content: Thought content normal.         Cognition and Memory: Memory is impaired.         Significant Labs:   CBC:   Recent Labs   Lab 06/19/20  1127 06/20/20  0435   WBC 5.68 6.20   HGB 10.3* 9.8*   HCT 34.8* 32.2*    131*     CMP:   Recent Labs   Lab 06/19/20  1127 06/20/20  0435    139   K 4.1 4.3    107   CO2 19* 19*    76   BUN 16 17   CREATININE 1.7* 1.5*   CALCIUM 8.7 8.9   PROT 6.9  --    ALBUMIN 3.6  --    BILITOT 0.5  --    ALKPHOS 60  --    AST 18  --    ALT 8*  --    ANIONGAP 13 13   EGFRNONAA 25* 30*     Urine Studies:   Recent Labs   Lab 06/19/20  1128   COLORU Yellow   APPEARANCEUA Clear   PHUR 6.0   SPECGRAV 1.020   PROTEINUA Negative   GLUCUA Negative   KETONESU Negative   BILIRUBINUA Negative   OCCULTUA Trace*   NITRITE Negative   UROBILINOGEN Negative   LEUKOCYTESUR Negative     All pertinent labs within the past 24 hours have been reviewed.    Significant Imaging: I have reviewed all pertinent imaging results/findings within the past 24 hours.      Assessment/Plan:      * Syncope due to orthostatic hypotension  - Uncertain etiology and suspect orthostatic hypotension, but cannot rule out possibility of arrythmia  - EKG on admission without any ST/T wave changes  - Most recent echocardiogram  With EF 65%, grade II diastolic dysfunction and severe AVS   - Check carotid ultrasound  - Orthostatic this am, gentle IVF, orthostatics [id number]   - toContinue to monitor on telemetry    - Gentle IVF  - PT/OT consult         Age-related physical debility  - Frequent falls related to  syncope  - PT/OT   - Palliative care for advanced directives planning  - Fall precautions      Nonrheumatic aortic valve stenosis  - Most recent echocardiogram 3/2019 with Severe aortic valve stenosis; Normal EF, low stroke volume   - Repeat echo     CKD (chronic kidney disease) stage 4, GFR 15-29 ml/min  - Baseline creatine 1.6-2.0; on admission Cr 1.7 an appears stable at baseline   - Monitor intake/output    Hyperlipidemia  - Continue home meds: Crestor 5 mg QD   - Lipid panel for AM       Essential hypertension  - Hypertensive on arrival to ED; states no blood pressure medication prior to ED arrival  - Will continue home medications: Toprol XL 50 mg QD, benazapril 20 mg QD, amlodipine 10 mg QD   - Monitor         VTE Risk Mitigation (From admission, onward)         Ordered     IP VTE HIGH RISK PATIENT  Once      06/19/20 1816     Place sequential compression device  Until discontinued      06/19/20 1816                      Lucero Camacho NP  Department of Hospital Medicine   [facility]

## 2020-06-20 NOTE — CONSULTS
[facility]  Cardiology  Consult Note    Patient Name: Ignacio Oakes  MRN: [id number]  Admission Date: 6/19/2020  Hospital Length of Stay: 0 days  Code Status: Full Code   Attending Provider: Miguelina Gonzalez MD   Consulting Provider: Marta Barr MD  Primary Care Physician: Janeen Ralph NP  Principal Problem:Syncope due to orthostatic hypotension    Patient information was obtained from patient, past medical records and ER records.     Inpatient consult to Cardiology  Consult performed by: Marta Barr MD  Consult ordered by: Lucero Camacho NP  Reason for consult: Syncope        Subjective:     Chief Complaint:  Syncope.     HPI:    Ignacio Oakes is a 89 y.o.female with hypertension. She has severe aortic stenosis. She has been seen in the Valve Clinic at [facility] for her severe AS and is not a candidate for TAVR as her mean gradient is below 40 mmHg. She has been admitted in the past for syncope. She presents after witnessed syncope that occurred after she got up and went to the kitchen. She was brought to the ER. Her blood pressure was 200/120 mmHg on presentation. She was admitted for observation.         Past Medical History:   Diagnosis Date    High cholesterol     Hyperlipidemia     Hypertension     Syncope     Syncope and collapse        History reviewed. No pertinent surgical history.    Review of patient's allergies indicates:  No Known Allergies    No current facility-administered medications on file prior to encounter.      Current Outpatient Medications on File Prior to Encounter   Medication Sig    amlodipine-benazepril 10-20mg (LOTREL) 10-20 mg per capsule Take 1 capsule by mouth once daily.    aspirin (ECOTRIN) 81 MG EC tablet Take 81 mg by mouth once daily.    meclizine (ANTIVERT) 12.5 mg tablet Take 12.5 mg by mouth 3 (three) times daily as needed for Dizziness.    multivit-mineral-iron-lutein Tab Take by mouth.    rosuvastatin (CRESTOR) 5 MG tablet Take 5 mg  by mouth every other day.     [DISCONTINUED] metoprolol succinate (TOPROL XL) 50 MG 24 hr tablet Take 50 mg by mouth once daily.    [DISCONTINUED] omeprazole (PRILOSEC) 20 MG capsule TK 1 C PO QD     Family History     Problem Relation (Age of Onset)    No Known Problems Mother, Father        Tobacco Use    Smoking status: Never Smoker    Smokeless tobacco: Never Used   Substance and Sexual Activity    Alcohol use: No    Drug use: No    Sexual activity: Not on file     Review of Systems   Unable to perform ROS: dementia   Constitution: Negative for fever and malaise/fatigue.   Cardiovascular: Positive for syncope. Negative for chest pain, claudication, leg swelling, orthopnea, palpitations and paroxysmal nocturnal dyspnea.   Respiratory: Negative for hemoptysis, shortness of breath and wheezing.    Genitourinary: Negative for hematuria.     Objective:     Vital Signs (Most Recent):  Temp: 98 °F (36.7 °C) (06/20/20 0722)  Pulse: 72 (06/20/20 1122)  Resp: 18 (06/20/20 0722)  BP: (!) 123/58 (06/20/20 1122)  SpO2: 98 % (06/20/20 0722) Vital Signs (24h Range):  Temp:  [98 °F (36.7 °C)-98.5 °F (36.9 °C)] 98 °F (36.7 °C)  Pulse:  [] 72  Resp:  [15-25] 18  SpO2:  [95 %-100 %] 98 %  BP: (123-217)/() 123/58     Weight: 48.3 kg (106 lb 7.7 oz)  Body mass index is 19.48 kg/m².    SpO2: 98 %  O2 Device (Oxygen Therapy): room air      Intake/Output Summary (Last 24 hours) at 6/20/2020 1148  Last data filed at 6/20/2020 0700  Gross per 24 hour   Intake 348.75 ml   Output 100 ml   Net 248.75 ml       Lines/Drains/Airways     Peripheral Intravenous Line                 Peripheral IV - Single Lumen 06/20/20 1044 24 G Left Hand less than 1 day                Physical Exam   Constitutional: She appears well-developed and well-nourished.   Neck: Carotid bruit is present.   Cardiovascular: Normal rate, regular rhythm, S1 normal and S2 normal.   Murmur heard.   Harsh midsystolic murmur is present with a grade of 3/6 at  the upper right sternal border radiating to the neck.  Pulses:       Femoral pulses are 2+ on the right side and 2+ on the left side.  Pulmonary/Chest: Effort normal and breath sounds normal.   Abdominal: Soft. Normal appearance. There is no abdominal tenderness.   Musculoskeletal:      Right ankle: She exhibits no swelling.      Left ankle: She exhibits no swelling.   Neurological: She is alert. She is disoriented.   Psychiatric: She has a normal mood and affect. Her speech is normal. Cognition and memory are impaired.       Current Medications:     amLODIPine  10 mg Oral Daily    And    benazepriL  20 mg Oral Daily    aspirin  81 mg Oral Daily    rosuvastatin  5 mg Oral Daily     Current Laboratory Results:    Recent Results (from the past 24 hour(s))   COVID-19 Rapid Screening    Collection Time: 06/19/20  1:28 PM   Result Value Ref Range    SARS-CoV-2 RNA, Amplification, Qual Negative Negative   CBC auto differential    Collection Time: 06/20/20  4:35 AM   Result Value Ref Range    WBC 6.20 3.90 - 12.70 K/uL    RBC 3.51 (L) 4.00 - 5.40 M/uL    Hemoglobin 9.8 (L) 12.0 - 16.0 g/dL    Hematocrit 32.2 (L) 37.0 - 48.5 %    Mean Corpuscular Volume 92 82 - 98 fL    Mean Corpuscular Hemoglobin 27.9 27.0 - 31.0 pg    Mean Corpuscular Hemoglobin Conc 30.4 (L) 32.0 - 36.0 g/dL    RDW 14.4 11.5 - 14.5 %    Platelets 131 (L) 150 - 350 K/uL    MPV 13.0 (H) 9.2 - 12.9 fL    Immature Granulocytes 0.2 0.0 - 0.5 %    Gran # (ANC) 4.1 1.8 - 7.7 K/uL    Immature Grans (Abs) 0.01 0.00 - 0.04 K/uL    Lymph # 1.4 1.0 - 4.8 K/uL    Mono # 0.6 0.3 - 1.0 K/uL    Eos # 0.1 0.0 - 0.5 K/uL    Baso # 0.06 0.00 - 0.20 K/uL    nRBC 0 0 /100 WBC    Gran% 66.7 38.0 - 73.0 %    Lymph% 21.8 18.0 - 48.0 %    Mono% 9.5 4.0 - 15.0 %    Eosinophil% 0.8 0.0 - 8.0 %    Basophil% 1.0 0.0 - 1.9 %    Differential Method Automated    Magnesium    Collection Time: 06/20/20  4:35 AM   Result Value Ref Range    Magnesium 2.1 1.6 - 2.6 mg/dL   Basic  metabolic panel    Collection Time: 06/20/20  4:35 AM   Result Value Ref Range    Sodium 139 136 - 145 mmol/L    Potassium 4.3 3.5 - 5.1 mmol/L    Chloride 107 95 - 110 mmol/L    CO2 19 (L) 23 - 29 mmol/L    Glucose 76 70 - 110 mg/dL    BUN, Bld 17 10 - 30 mg/dL    Creatinine 1.5 (H) 0.5 - 1.4 mg/dL    Calcium 8.9 8.7 - 10.5 mg/dL    Anion Gap 13 8 - 16 mmol/L    eGFR if African American 34 (A) >60 mL/min/1.73 m^2    eGFR if non African American 30 (A) >60 mL/min/1.73 m^2   Troponin I    Collection Time: 06/20/20  4:35 AM   Result Value Ref Range    Troponin I 0.043 (H) 0.000 - 0.026 ng/mL   Echo Color Flow Doppler? Yes; Bubble Contrast? No    Collection Time: 06/20/20 10:55 AM   Result Value Ref Range    BSA 1.45 m2    TDI SEPTAL 0.05 m/s    LV LATERAL E/E' RATIO 14.50 m/s    LV SEPTAL E/E' RATIO 11.60 m/s    LA WIDTH 3.07 cm    TDI LATERAL 0.04 m/s    PV PEAK VELOCITY 0.76 cm/s    LVIDD 3.82 3.5 - 6.0 cm    IVS 1.25 (A) 0.6 - 1.1 cm    PW 1.18 (A) 0.6 - 1.1 cm    LVIDS 2.79 2.1 - 4.0 cm    FS 27 28 - 44 %    IVC proximal 1.1 cm    LA volume 60.31 cm3    Sinus 3.38 cm    STJ 2.27 cm    LV mass 157.35 g    LA size 4.45 cm    TAPSE 1.93 cm    Left Ventricle Relative Wall Thickness 0.62 cm    AV mean gradient 32 mmHg    AV valve area 1.06 cm2    AV Velocity Ratio 0.24     AV index (prosthetic) 0.30     E/A ratio 0.62     Mean e' 0.05 m/s    E wave decelartion time 365.98 msec    LVOT diameter 2.11 cm    LVOT area 3.5 cm2    LVOT peak lucius 0.86 m/s    LVOT peak VTI 23.93 cm    Ao peak lucius 3.55 m/s    Ao VTI 78.96 cm    Mr max lucius 0.05 m/s    LVOT stroke volume 83.63 cm3    AV peak gradient 50 mmHg    E/E' ratio 12.89 m/s    MV Peak E Lucius 0.58 m/s    TR Max Lucius 2.60 m/s    MV Peak A Lucius 0.93 m/s    LV Systolic Volume 29.31 mL    LV Systolic Volume Index 20.0 mL/m2    LV Diastolic Volume 62.74 mL    LV Diastolic Volume Index 42.91 mL/m2    LA Volume Index 41.2 mL/m2    LV Mass Index 108 g/m2    Left Atrium Minor Axis 5.50  cm    Left Atrium Major Axis 4.92 cm    Triscuspid Valve Regurgitation Peak Gradient 27 mmHg    LA Volume Index (Mod) 32.1 mL/m2    LA volume (mod) 47.00 cm3     Current Imaging Results:    X-Ray Chest AP Portable   Final Result      No radiographic acute intrathoracic process seen on this single view.         Electronically signed by: Bashir Montilla MD   Date:    06/19/2020   Time:    13:48        9/23/2019: Echo:    Normal left ventricular systolic function. The estimated ejection fraction is 65%  Mild left atrial enlargement.  Grade II (moderate) indeterminate left ventricular diastolic function left ventricular diastolic dysfunction consistent with pseudonormalization. Elevated left atrial pressure.  Normal right ventricular systolic function.  Severe aortic valve stenosis; Normal EF, low stroke volume (33cc/m2) Aortic valve area is 0.72 cm2; peak velocity is 3.28 m/s; mean gradient is 26 mmHg. DI 0.23  Mild tricuspid regurgitation.  Mild mitral regurgitation.  Normal central venous pressure (3 mm Hg).  The estimated PA systolic pressure is 32 mm Hg.    ECG: SR. LVH with repolarization abnormality.      Assessment and Plan:     Active Diagnoses:    Diagnosis Date Noted POA    PRINCIPAL PROBLEM:  Syncope due to orthostatic hypotension [I95.1] 03/05/2017 Yes    Age-related physical debility [R54] 06/19/2020 Yes    Nonrheumatic aortic valve stenosis [I35.0] 03/07/2017 Yes    Hyperlipidemia [E78.5] 05/09/2015 Yes    Essential hypertension [I10] 05/09/2015 Yes    CKD (chronic kidney disease) stage 4, GFR 15-29 ml/min [N18.4] 05/09/2015 Yes      Problems Resolved During this Admission:     Assessment and Plan:     1. Syncope              6/19/2020: Presents after syncope after standing up.   Suspect related to decrease in blood pressure with standing in setting of severe AS.   Doubt arrhythmia.   Monitor HR and blood pressure, supine, sitting and standing.                 2. Aortic Stenosis              3/6/2017:  Echo: AS - 3.0 m/s - 0.9 cm2.   9/23/2019: Echo: Normal left ventricular size and systolic function. EF 65%. Moderate diastolic dysfunction. Mildly dilated LA. Severe AS - 3.3 m/s - 0.7 cm2.   Not a candidate for TAVR as MG <40 mmHg.    3. Hypertension   At home been on amlodipine 10 mg/benazepril 20 mg Q24.    4. Hypercholesterolemia   On rosuvastatin 5 mg Q24.    VTE Risk Mitigation (From admission, onward)         Ordered     IP VTE HIGH RISK PATIENT  Once      06/19/20 1816     Place sequential compression device  Until discontinued      06/19/20 1816                Thank you for your consult.    I will follow-up with patient. Please contact us if you have any additional questions.    Marta Barr MD  Cardiology   [facility]

## 2020-06-20 NOTE — PT/OT/SLP EVAL
Physical Therapy Evaluation    Patient Name:  Ignacio Oakes   MRN:  [id number]    Recommendations:     Discharge Recommendations:  home, home health PT, home health OT(24 hour supervision/assist)   Discharge Equipment Recommendations: walker, rolling   Barriers to discharge: Decreased caregiver support    Assessment:     Ignacio Oakes is a 89 y.o. female admitted with a medical diagnosis of Syncope due to orthostatic hypotension.  She presents with the following impairments/functional limitations:  weakness, impaired endurance, impaired self care skills, impaired functional mobilty, gait instability, impaired balance, decreased lower extremity function, impaired cardiopulmonary response to activity.    PT orders received. PT evaluation completed and goals/POC established. Pt tolerated evaluation well with no adverse reactions. Pt performed supine <> sit with min A, sit <> stand with min A, and ambulation x 40 ft with rolling walker and min A. PT will continue to follow and progress goals as tolerated. Recommend discharge to home with HHPT/OT and 24 hour supervision/assist.      Rehab Prognosis: Good; patient would benefit from acute skilled PT services to address these deficits and reach maximum level of function.    Recent Surgery: * No surgery found *      Plan:     During this hospitalization, patient to be seen 5 x/week to address the identified rehab impairments via gait training, therapeutic activities, therapeutic exercises and progress toward the following goals:    · Plan of Care Expires:  07/20/20    Subjective     Chief Complaint: None stated.  Patient/Family Comments/goals: No goal stated.  Pain/Comfort:  · Pain Rating 1: 0/10  · Pain Rating Post-Intervention 1: 0/10    Patients cultural, spiritual, Rastafari conflicts given the current situation: no(None stated)    Information about PLOF and home environment may be inaccurate as patient was confused during evaluation.  Living Environment:  · Pt  lives with her son and daughter in a single story house with no steps to enter.   · DME owned: None  · Upon discharge, patient will have assistance from her daughter.  Prior level of function:  · Ambulation: Independent  · ADL's: Independent  · IADLs: Daughter/son do cooking/cleaning. Does not drive.  · Falls: Admitted for fall at home    Objective:     Communicated with RN (Laury) prior to session.  Patient found supine with peripheral IV, bed alarm(Telesitter)  upon PT entry to room.    General Precautions: Standard, fall   Orthopedic Precautions:N/A   Braces: N/A     Exams:  · Cognition:   · Patient is oriented to person only.  · Pt follows approximately 100% of single commands.    · Mood: Pleasant and cooperative, but confused  · Safety Awareness: Poor  · Musculoskeletal:  · Posture:  WNL  · LE ROM/Strength:   · R ROM: WNL  · L ROM: WNL  · R Strength:   · Hip flexion: 5/5  · Knee extension: 5/5  · Dorsiflexion: 5/5   · L Strength:   · Hip flexion: 5/5  · Knee extension: 5/5  · Dorsiflexion: 5/5   · Neuromuscular:  · Sensation: Intact to light touch bilateral LEs.   · Tone/Reflexes: No impairments identified with functional mobility. No formal testing performed.   · Coordination:  · Toe tapping: intact  · Balance:   · Static sitting: Good+  · Dynamic sitting: Good  · Static standing: Fair  · Dynamic standing: Fair-  · Visual-vestibular: No impairments identified with functional mobility. No formal testing performed.  · Integument: Visible skin intact  · Cardiopulmonary:  · Vital signs:      06/20/20 1118 06/20/20 1120 06/20/20 1122   Vital Signs   BP (!) 145/66 (!) 129/59 (!) 123/58   MAP (mmHg) 95 88 84   BP Location Right arm Right arm Right arm   Patient Position Lying Sitting Standing       · Edema: None noted.    Functional Mobility: Gait belt and non-slip socks donned prior to mobility.  · Bed Mobility:     · Supine to Sit: minimum assistance  · Sit to Supine: minimum assistance  · Transfers:     · Sit to  Stand:  minimum assistance with rolling walker  · Gait: 40 ft with rolling walker and minimum assistance.  · Demonstrated decreased frances, narrow MARAH. Required verbal cues for safe management of rolling walker.    AM-PAC 6 CLICK MOBILITY  Total Score:18     Patient left supine with all lines intact, call button in reach and bed alarm on. Blue pads/linens under patient in positioned correctly, clean, and free of wrinkles at end of session. Telemetry leads in place at end of session.    GOALS:   Multidisciplinary Problems     Physical Therapy Goals        Problem: Physical Therapy Goal    Goal Priority Disciplines Outcome Goal Variances Interventions   Physical Therapy Goal     PT, PT/OT Ongoing, Progressing     Description: Goals to be met by: 7/20/2020    Patient will perform the following to increase strength, improve mobility, and return to prior level of function:    1. Supine <> sit with SBA.  2. Sit<>stand with SBA with least restrictive assistive device.  3. Gait x 200 feet with SBA with least restrictive assistive device.                   History:     Past Medical History:   Diagnosis Date    High cholesterol     Hyperlipidemia     Hypertension     Syncope     Syncope and collapse        History reviewed. No pertinent surgical history.    Time Tracking:     PT Received On: 06/20/20  PT Start Time: 1112     PT Stop Time: 1130  PT Total Time (min): 18 min     Billable Minutes: Evaluation 18      Susan Pantoja, PT  06/20/2020

## 2020-06-20 NOTE — PROGRESS NOTES
IVETTE Eason notified. No interventions ordered at this time. Will encourage patient to drink more fluids and monitor closely.       06/19/20 1919 06/20/20 0400   Intake (mL)   P.O. 240 mL  --    Output (mL)   Urine  --  0 mL   Urine Assessment   Bladder Scan Volume (mL)  --  246 mL

## 2020-06-21 NOTE — NURSING
AVS provided.  No change in medications.  Follow up discussed with daughter.  Telemetry box returned.  Daughter will provide transport home.

## 2020-06-21 NOTE — PROGRESS NOTES
[facility]  Cardiology  Progress Note    Patient Name: Ignacio Oakes  MRN: [id number]  Admission Date: 6/19/2020  Hospital Length of Stay: 0 days  Code Status: Full Code   Attending Physician: Miguelina Gonzalez MD   Primary Care Physician: Janeen Ralph NP  Expected Discharge Date:   Principal Problem:Syncope due to orthostatic hypotension    Subjective:     Brief HPI:    Ignacio Oakes is a 89 y.o.female with hypertension. She has severe aortic stenosis. She has been seen in the Valve Clinic at [facility] for her severe AS and is not a candidate for TAVR as her mean gradient is below 40 mmHg. She has been admitted in the past for syncope. She presents after witnessed syncope that occurred after she got up and went to the kitchen. She was brought to the ER. Her blood pressure was 200/120 mmHg on presentation. She was admitted for observation.       Hospital Course:    Telemetry.    Metoprolol was discontinued.    Interval History:    Comfortable supine. No ne or tachyarrhythmias have been seen.    Review of Systems   Constitution: Positive for malaise/fatigue.   Cardiovascular: Positive for leg swelling, orthopnea, palpitations and paroxysmal nocturnal dyspnea. Negative for chest pain.   Respiratory: Negative for shortness of breath.    Gastrointestinal: Negative for nausea and vomiting.     Objective:     Vital Signs (Most Recent):  Temp: 98.3 °F (36.8 °C) (06/21/20 0716)  Pulse: 63 (06/21/20 1023)  Resp: 16 (06/21/20 0716)  BP: (!) 113/56 (06/21/20 1023)  SpO2: 95 % (06/21/20 1023) Vital Signs (24h Range):  Temp:  [97.4 °F (36.3 °C)-98.3 °F (36.8 °C)] 98.3 °F (36.8 °C)  Pulse:  [50-71] 63  Resp:  [14-18] 16  SpO2:  [91 %-99 %] 95 %  BP: (113-174)/(55-81) 113/56     Weight: 48.3 kg (106 lb 7.7 oz)  Body mass index is 19.48 kg/m².    SpO2: 95 %  O2 Device (Oxygen Therapy): room air      Intake/Output Summary (Last 24 hours) at 6/21/2020 1137  Last data filed at 6/21/2020 1000  Gross per 24 hour    Intake 365 ml   Output 1000 ml   Net -635 ml       Lines/Drains/Airways     None                 Physical Exam   Constitutional: She appears well-developed.   Cardiovascular: Normal rate, regular rhythm, S1 normal and S2 normal.   Murmur heard.  Pulmonary/Chest: Effort normal and breath sounds normal.   Abdominal: Soft. Normal appearance.   Musculoskeletal:      Right ankle: She exhibits no swelling.      Left ankle: She exhibits no swelling.   Skin: Skin is warm, dry and intact. No rash noted.       Current Medications:     amLODIPine  10 mg Oral Daily    And    benazepriL  20 mg Oral Daily    aspirin  81 mg Oral Daily    rosuvastatin  5 mg Oral Daily     Current Laboratory Results:    Recent Results (from the past 24 hour(s))   CBC auto differential    Collection Time: 06/21/20  3:39 AM   Result Value Ref Range    WBC 5.74 3.90 - 12.70 K/uL    RBC 3.46 (L) 4.00 - 5.40 M/uL    Hemoglobin 9.9 (L) 12.0 - 16.0 g/dL    Hematocrit 31.5 (L) 37.0 - 48.5 %    Mean Corpuscular Volume 91 82 - 98 fL    Mean Corpuscular Hemoglobin 28.6 27.0 - 31.0 pg    Mean Corpuscular Hemoglobin Conc 31.4 (L) 32.0 - 36.0 g/dL    RDW 14.2 11.5 - 14.5 %    Platelets 254 150 - 350 K/uL    MPV 11.4 9.2 - 12.9 fL    Immature Granulocytes 0.2 0.0 - 0.5 %    Gran # (ANC) 3.7 1.8 - 7.7 K/uL    Immature Grans (Abs) 0.01 0.00 - 0.04 K/uL    Lymph # 1.4 1.0 - 4.8 K/uL    Mono # 0.5 0.3 - 1.0 K/uL    Eos # 0.1 0.0 - 0.5 K/uL    Baso # 0.03 0.00 - 0.20 K/uL    nRBC 0 0 /100 WBC    Gran% 64.7 38.0 - 73.0 %    Lymph% 24.7 18.0 - 48.0 %    Mono% 8.7 4.0 - 15.0 %    Eosinophil% 1.2 0.0 - 8.0 %    Basophil% 0.5 0.0 - 1.9 %    Differential Method Automated    Basic metabolic panel    Collection Time: 06/21/20  3:39 AM   Result Value Ref Range    Sodium 139 136 - 145 mmol/L    Potassium 4.5 3.5 - 5.1 mmol/L    Chloride 107 95 - 110 mmol/L    CO2 22 (L) 23 - 29 mmol/L    Glucose 67 (L) 70 - 110 mg/dL    BUN, Bld 20 10 - 30 mg/dL    Creatinine 1.5 (H) 0.5 -  1.4 mg/dL    Calcium 9.1 8.7 - 10.5 mg/dL    Anion Gap 10 8 - 16 mmol/L    eGFR if African American 34 (A) >60 mL/min/1.73 m^2    eGFR if non African American 30 (A) >60 mL/min/1.73 m^2     Current Imaging Results:    X-Ray Chest AP Portable   Final Result      No radiographic acute intrathoracic process seen on this single view.         Electronically signed by: Bashir Montilla MD   Date:    06/19/2020   Time:    13:48          Assessment and Plan:     Problem List:    Active Diagnoses:    Diagnosis Date Noted POA    PRINCIPAL PROBLEM:  Syncope due to orthostatic hypotension [I95.1] 03/05/2017 Yes    Age-related physical debility [R54] 06/19/2020 Yes    Nonrheumatic aortic valve stenosis [I35.0] 03/07/2017 Yes    Hyperlipidemia [E78.5] 05/09/2015 Yes    Essential hypertension [I10] 05/09/2015 Yes    CKD (chronic kidney disease) stage 4, GFR 15-29 ml/min [N18.4] 05/09/2015 Yes      Problems Resolved During this Admission:     Assessment and Plan:     1. Syncope              6/19/2020: Presents after syncope that occurred after standing up.              Suspect related to decrease in blood pressure with standing in setting of severe AS.              Doubt arrhythmia.              HR and blood pressure, supine, sitting and standing has been monitored.    I think it would be reasonable to advised her family to always assist he if she would be ambulating.                2. Aortic Stenosis              3/6/2017: Echo: AS - 3.0 m/s - 0.9 cm2.              9/23/2019: Echo: Normal left ventricular size and systolic function. EF 65%. Moderate diastolic dysfunction. Mildly dilated LA. Severe AS - 3.3 m/s - 0.7 cm2.              Not a candidate for TAVR as MG <40 mmHg.     3. Hypertension              At home been on amlodipine 10 mg/benazepril 20 mg Q24.     4. Hypercholesterolemia              On rosuvastatin 5 mg Q24.      VTE Risk Mitigation (From admission, onward)         Ordered     IP VTE HIGH RISK PATIENT  Once       06/19/20 1816     Place sequential compression device  Until discontinued      06/19/20 1816                Marta Barr MD  Cardiology  [facility]

## 2020-06-21 NOTE — PLAN OF CARE
Spoke with pt two daughters, they state they provide all care pt needs - decline offer for home health.  State if they want HH in the future, they will request from her PCP.      Daughter asked for information about WC, informed that PT/OT has not recommended WC, may incur a bill from [employer] if it is ordered - daughter states she will speak with PCP about wheelchair as well.      Pt son to provide transportation home, no further DC needs from CM perspective.       06/21/20 1409   Final Note   Assessment Type Final Discharge Note   Anticipated Discharge Disposition Home   What phone number can be called within the next 1-3 days to see how you are doing after discharge? [phone]   Hospital Follow Up  Appt(s) scheduled? Yes   Discharge plans and expectations educations in teach back method with documentation complete? Yes   Right Care Referral Info   Post Acute Recommendation No Care   Post-Acute Status   Post-Acute Authorization Home Health   Home Health Status Patient/Family Changed Mind

## 2020-06-21 NOTE — PLAN OF CARE
[facility]    HOME HEALTH ORDERS  FACE TO FACE ENCOUNTER    Patient Name: Ignacio Oakes  YOB: 1925    PCP: Janeen Ralph NP   PCP Address: [address] [place] [address] / [facility] [address]  PCP Phone Number: [phone]  PCP Fax: [phone]    Encounter Date: 06/21/2020    Admit to Home Health    Diagnoses:  Active Hospital Problems    Diagnosis  POA    *Syncope due to orthostatic hypotension [I95.1]  Yes     Priority: 1 - High    Age-related physical debility [R54]  Yes    Nonrheumatic aortic valve stenosis [I35.0]  Yes    Hyperlipidemia [E78.5]  Yes    Essential hypertension [I10]  Yes    CKD (chronic kidney disease) stage 4, GFR 15-29 ml/min [N18.4]  Yes      Resolved Hospital Problems   No resolved problems to display.       No future appointments.  Follow-up Information     Janeen Ralph NP. Schedule an appointment as soon as possible for a visit in 1 month.    Specialty: Urgent Care  Why: Follow up after hospital discharge, or sooner as needed  Contact information:  [address] [place] [address]  [phone]             Minesh Granados Jr, MD. Schedule an appointment as soon as possible for a visit in 1 month.    Specialty: Cardiology  Why: Cardiology:  follow up after hospital discharge  Contact information:  [facility] [facility] [address]  [phone]                     I have seen and examined this patient face to face today. My clinical findings that support the need for the home health skilled services and home bound status are the following:  Requiring assistive device to leave home due to unsteady gait caused by  Weakness/Debility.    Allergies:Review of patient's allergies indicates:  No Known Allergies    Diet: regular diet and supplements: House supplement, one can every 8 hours    Activities: activity as tolerated, ambulate in house with assistance and fall precautions.    Nursing:   SN to complete comprehensive assessment  including routine vital signs. Instruct on disease process and s/s of complications to report to MD. Review/verify medication list sent home with the patient at time of discharge  and instruct patient/caregiver as needed. Frequency may be adjusted depending on start of care date.    Notify MD if SBP > 160 or < 90; DBP > 90 or < 50; HR > 120 or < 50; Temp > 101     CONSULTS:    Physical Therapy to evaluate and treat. Evaluate for home safety and equipment needs; Establish/upgrade home exercise program. Perform / instruct on therapeutic exercises, gait training, transfer training, and Range of Motion.  Occupational Therapy to evaluate and treat. Evaluate home environment for safety and equipment needs. Perform/Instruct on transfers, ADL training, ROM, and therapeutic exercises.  Speech Therapy  to evaluate and treat for  Language, Swallowing and Cognition.   to evaluate for community resources/long-range planning.  Aide to provide assistance with personal care, ADLs, and vital signs.       Medications: Review discharge medications with patient and family and provide education.      Current Discharge Medication List      CONTINUE these medications which have NOT CHANGED    Details   amlodipine-benazepril 10-20mg (LOTREL) 10-20 mg per capsule Take 1 capsule by mouth once daily.      aspirin (ECOTRIN) 81 MG EC tablet Take 81 mg by mouth once daily.      meclizine (ANTIVERT) 12.5 mg tablet Take 12.5 mg by mouth 3 (three) times daily as needed for Dizziness.      multivit-mineral-iron-lutein Tab Take by mouth.      rosuvastatin (CRESTOR) 5 MG tablet Take 5 mg by mouth every other day.              I certify that this patient is confined to her home and needs intermittent skilled nursing care, physical therapy, speech therapy and occupational therapy and social work for home evaluation.

## 2020-06-21 NOTE — PT/OT/SLP DISCHARGE
Occupational Therapy Discharge Summary    Ignacio Oakes  MRN: [id number]   Principal Problem: Syncope due to orthostatic hypotension      Patient Discharged from acute Occupational Therapy on 6/211/20.  Please refer to prior OT note dated 6/20/20 for functional status.    Assessment:      Patient has not met goals. evaluation only    Objective:     GOALS:   Multidisciplinary Problems     Occupational Therapy Goals        Problem: Occupational Therapy Goal    Goal Priority Disciplines Outcome Interventions   Occupational Therapy Goal     OT, PT/OT     Description: Goals to be met by 6/29/20    Eat 50% of food during a meal with Min A to stay on task  G/H (mouth wash, wash face and hands) with SBA  UBD SBA unsupported sitting  LBD (briefs and socks) with Min A  Toilet hygiene at BSC or toilet with Min A  Follow one-step commands 50% with Min A manual cues                   Reasons for Discontinuation of Therapy Services  hospital discharge      Plan:     Patient Discharged to: home with family care.    Krystal Boyd, ScD, LOTR  6/21/2020

## 2020-06-21 NOTE — PLAN OF CARE
Problem: Adult Inpatient Plan of Care  Goal: Plan of Care Review  Outcome: Ongoing, Progressing  Flowsheets (Taken 6/21/2020 0426)  Plan of Care Reviewed With:   patient   daughter   SR on telemetry. Vitals stable. Will continue to monitor.     Problem: Fall Injury Risk  Goal: Absence of Fall and Fall-Related Injury  Outcome: Ongoing, Progressing  Intervention: Identify and Manage Contributors to Fall Injury Risk  Flowsheets (Taken 6/21/2020 0426)  Medication Review/Management: medications reviewed  Intervention: Promote Injury-Free Environment  Flowsheets (Taken 6/21/2020 0426)  Environmental Safety Modification:   room near unit station   room organization consistent   clutter free environment maintained   lighting adjusted     Problem: Coping Ineffective  Goal: Effective Coping  Outcome: Ongoing, Progressing  Intervention: Support and Enhance Coping Strategies  Flowsheets (Taken 6/21/2020 0426)  Environmental Support: calm environment promoted     Problem: Skin Injury Risk Increased  Goal: Skin Health and Integrity  Outcome: Ongoing, Progressing  Intervention: Optimize Skin Protection  Flowsheets (Taken 6/21/2020 0426)  Pressure Reduction Techniques: frequent weight shift encouraged  Intervention: Promote and Optimize Oral Intake  Flowsheets (Taken 6/21/2020 0426)  Oral Nutrition Promotion:   calorie dense foods provided   rest periods promoted

## 2020-06-27 NOTE — DISCHARGE SUMMARY
[facility] Hospital Medicine  Discharge Summary      Patient Name: Ignacio Oakes  MRN: [id number]  Admission Date: 6/19/2020  Hospital Length of Stay: 0 days  Discharge Date and Time: 6/21/2020  3:14 PM  Attending Physician: Miguelina Gonzalez MD   Discharging Provider: Lucero Camacho NP  Primary Care Provider: Janeen Ralph NP      HPI:   Ignacio Oakes is a 89 year old female with medical history of chronic diastolic heart failure with severe aortic valve stenosis, recurrent syncopal episodes and hypertension.  She presented to the [facility] ED via EMS after family reported witnessed near-syncopal episode while seated  prior to arrival.  The patient's daughter witnessed the event and reported that the patient was seated when daughter noted patient  Per family, the patient's most recent syncopal episode was approximately three weeks ago.  Family concerned as recurrent syncopal episodes in past have not been so close together.  On arrival to the ED, the patient was found to be hypertensive with blood pressure,  200/120.  Family reports, the patient had not taken her blood pressure medications this morning.  Initial work in ED revealed elevated creatinine 1.7 that appears to be at baseline, troponin 0.018 and otherwise labs unremarkable.  Electrocardiogram without evidence of acute ischemia and chest x-ray without evidence for consolidaiton or effusion.  She will be admitted under observation for further monitoring for arrhythmia.         Hospital Course:   Ignacio Oakes was admitted for evaluation of near syncopal episode.   Initial uncertain etiology of recurrent syncope; suspected orthostatic hypotension, but could not rule out possibility of arrythmia. After admission, the patient was monitored on telemetry without evidence of arrythmia.  Echocardiogram was without significant change from prior with EF 65%, grade II diastolic dysfunction and severe aortic stenosis.  Cardiology was  consulted and felt patient's symptoms likely related to both orthostasis worsened by severe stenosis.  Agree with recommendations for discontinuing metoprolol.  Both physical and occupational therapy evaluated the patient and agree with home health services after discharge.  I have discussed with the patient's family the importance of increased supervision of patient due to risk of falls given recurrent near syncopal episodes. I have recommended follow up as needed with Cardiology and patient's Primary Care provider.  Diagnosis, plan of care and management were discussed with the patient's family who agreed with plan and was eager for discharge.      Consults:   Consults (From admission, onward)        Status Ordering Provider     Inpatient consult to Cardiology  Once     Provider:  Marta Barr MD    Completed FELIPE CHAU        Service: Hospital Medicine    Final Active Diagnoses:    Diagnosis Date Noted POA    PRINCIPAL PROBLEM:  Syncope due to orthostatic hypotension [I95.1] 03/05/2017 Yes    Chest pain [R07.9]  Yes    Age-related physical debility [R54] 06/19/2020 Yes    Nonrheumatic aortic valve stenosis [I35.0] 03/07/2017 Yes    Hyperlipidemia [E78.5] 05/09/2015 Yes    Essential hypertension [I10] 05/09/2015 Yes    CKD (chronic kidney disease) stage 4, GFR 15-29 ml/min [N18.4] 05/09/2015 Yes    Syncope [R55] 05/09/2015 Yes      Problems Resolved During this Admission:       Discharged Condition: good    Disposition: [place] [place] [facility]    Follow Up:  Follow-up Information     Janeen Ralph NP. Schedule an appointment as soon as possible for a visit in 1 month.    Specialty: Urgent Care  Why: Follow up after hospital discharge, or sooner as needed  Contact information:  [address] [address] [address]  [phone]             Minesh Granados Jr, MD. Schedule an appointment as soon as possible for a visit in 1 month.    Specialty: Cardiology  Why: Cardiology:  follow up after  "hospital discharge  Contact information:  [address] [facility] [address]  [phone]                 Patient Instructions:      WHEELCHAIR FOR HOME USE     Order Specific Question Answer Comments   Hours in W/C per day: 4    Type of Wheelchair: Standard    Size(Width): 16"(small adult)    Leg Support: STD footrests    Lap Belt: Velcro    Cushion: Basic    Cushion: Foam    Justification for cushion: Prevent pressure ulcers    Height: 5' 2" (1.575 m)    Weight: 48.3 kg (106 lb 7.7 oz)    Does patient have medical equipment at home? none    Length of need (1-99 months): 36    Please check all that apply: Caregiver is capable and willing to operate wheelchair safely.      Diet Adult Regular     Notify your health care provider if you experience any of the following:  increased confusion or weakness     Notify your health care provider if you experience any of the following:  persistent dizziness, light-headedness, or visual disturbances     Notify your health care provider if you experience any of the following:  severe persistent headache     Activity as tolerated       Significant Diagnostic Studies: Labs: All labs within the past 24 hours have been reviewed        Medications:  Reconciled Home Medications:      Medication List      CONTINUE taking these medications    amlodipine-benazepril 10-20mg 10-20 mg per capsule  Commonly known as: LOTREL  Take 1 capsule by mouth once daily.     aspirin 81 MG EC tablet  Commonly known as: ECOTRIN  Take 81 mg by mouth once daily.     meclizine 12.5 mg tablet  Commonly known as: ANTIVERT  Take 12.5 mg by mouth 3 (three) times daily as needed for Dizziness.     multivit-mineral-iron-lutein Tab  Take by mouth.     rosuvastatin 5 MG tablet  Commonly known as: CRESTOR  Take 5 mg by mouth every other day.            Time spent on the discharge of patient: <30 minutes  Patient was seen and examined on the date of discharge and determined to be suitable for discharge.     "     Lucero Camacho NP  Department of Hospital Medicine  [facility]

## 2020-06-27 NOTE — HOSPITAL COURSE
Ignacio Oakes was admitted for evaluation of near syncopal episode.   Initial uncertain etiology of recurrent syncope; suspected orthostatic hypotension, but could not rule out possibility of arrythmia. After admission, the patient was monitored on telemetry without evidence of arrythmia.  Echocardiogram was without significant change from prior with EF 65%, grade II diastolic dysfunction and severe aortic stenosis.  Cardiology was consulted and felt patient's symptoms likely related to both orthostasis worsened by severe stenosis.  Agree with recommendations for discontinuing metoprolol.  Both physical and occupational therapy evaluated the patient and agree with home health services after discharge.  I have discussed with the patient's family the importance of increased supervision of patient due to risk of falls given recurrent near syncopal episodes. I have recommended follow up as needed with Cardiology and patient's Primary Care provider.  Diagnosis, plan of care and management were discussed with the patient's family who agreed with plan and was eager for discharge.

## 2020-09-21 NOTE — PROGRESS NOTES
INTERVENTIONAL CARDIOLOGY  VALVE CENTER    Referring Doctor: Dr. Granados    Reason for Consult: Severe Aortic Stenosis    HISTORY OF PRESENT ILLNESS:    Ignacio Oakes is a 89 y.o. female referred by Dr Granados for evaluation of severe AS. She has had increasing frequency of syncopal events. She was seen in TAVR clinic last year, was also complaining of syncope but did had LFLG AS. Her metoprolol was stopped at last year's visit. Since then, she was admitted again for syncope in 6/20, and at that time was hypertensive with /110. She was back on a beta blocker at the time of that admission which was discontinued.    She reports having symptoms of dizziness and presyncope which are frequent. She has occasional chest tightness and fatigue. She does not perform many ADLs and is no longer very active, but she has good days and bad days. She denies orthopnea, PND or edema.    she has undergone the following TAVR work-up:   ECHO (Date 9/21/20): WILTON= 0.6 cm2, MG= 41mmHg, Peak Lucius= 4.1 m/s, EF= 60%.   LHC (Date ): needed   STS: 6.9%   Frailty: 3/4 (walk 14.7 s,  strength and ADLs)   Iliacs are > on L and >  on R needed  Subclavian angiogram: needed  LVOT area by CTA is  cm2 and Avg Diameter is needed  Other CT findings  she is currently Cohort per  due to needed  CODE STATUS  EKG findings NSR, LVH with repolarization abnormality, no conduction delays  PFTs: needed          Past Medical History:   Diagnosis Date    High cholesterol     Hyperlipidemia     Hypertension     Syncope     Syncope and collapse        No past surgical history on file.    Review of Systems   All other systems reviewed and are negative.      LMP  (LMP Unknown)     Physical Exam   Constitutional: She is well-developed, well-nourished, and in no distress. No distress.   HENT:   Head: Normocephalic and atraumatic.   Eyes: Pupils are equal, round, and reactive to light. EOM are normal.   Cardiovascular: Normal rate, regular rhythm and  intact distal pulses.   Murmur (Grade II/VI crescendo decrescendo murmur loudest at right upper sternal border) heard.  Pulses:       Radial pulses are 2+ on the right side and 2+ on the left side.        Femoral pulses are 2+ on the right side and 2+ on the left side.       Dorsalis pedis pulses are 1+ on the right side and 1+ on the left side.        Posterior tibial pulses are 1+ on the right side and 1+ on the left side.   Pulmonary/Chest: Effort normal and breath sounds normal. No respiratory distress. She has no wheezes. She has no rales.   Abdominal: Soft. Bowel sounds are normal. She exhibits no distension. There is no abdominal tenderness.   Musculoskeletal:         General: No edema.   Skin: She is not diaphoretic.       CMP  Sodium   Date Value Ref Range Status   06/21/2020 139 136 - 145 mmol/L Final     Potassium   Date Value Ref Range Status   06/21/2020 4.5 3.5 - 5.1 mmol/L Final     Chloride   Date Value Ref Range Status   06/21/2020 107 95 - 110 mmol/L Final     CO2   Date Value Ref Range Status   06/21/2020 22 (L) 23 - 29 mmol/L Final     Glucose   Date Value Ref Range Status   06/21/2020 67 (L) 70 - 110 mg/dL Final     BUN, Bld   Date Value Ref Range Status   06/21/2020 20 10 - 30 mg/dL Final     Creatinine   Date Value Ref Range Status   06/21/2020 1.5 (H) 0.5 - 1.4 mg/dL Final     Calcium   Date Value Ref Range Status   06/21/2020 9.1 8.7 - 10.5 mg/dL Final     Total Protein   Date Value Ref Range Status   06/19/2020 6.9 6.0 - 8.4 g/dL Final     Albumin   Date Value Ref Range Status   06/19/2020 3.6 3.5 - 5.2 g/dL Final     Total Bilirubin   Date Value Ref Range Status   06/19/2020 0.5 0.1 - 1.0 mg/dL Final     Comment:     For infants and newborns, interpretation of results should be based  on gestational age, weight and in agreement with clinical  observations.  Premature Infant recommended reference ranges:  Up to 24 hours.............<8.0 mg/dL  Up to 48 hours............<12.0 mg/dL  3-5  days..................<15.0 mg/dL  6-29 days.................<15.0 mg/dL       Alkaline Phosphatase   Date Value Ref Range Status   06/19/2020 60 55 - 135 U/L Final     AST   Date Value Ref Range Status   06/19/2020 18 10 - 40 U/L Final     ALT   Date Value Ref Range Status   06/19/2020 8 (L) 10 - 44 U/L Final     Anion Gap   Date Value Ref Range Status   06/21/2020 10 8 - 16 mmol/L Final     eGFR if    Date Value Ref Range Status   06/21/2020 34 (A) >60 mL/min/1.73 m^2 Final     eGFR if non    Date Value Ref Range Status   06/21/2020 30 (A) >60 mL/min/1.73 m^2 Final     Comment:     Calculation used to obtain the estimated glomerular filtration  rate (eGFR) is the CKD-EPI equation.        Lab Results   Component Value Date    WBC 5.74 06/21/2020    HGB 9.9 (L) 06/21/2020    HCT 31.5 (L) 06/21/2020    MCV 91 06/21/2020     06/21/2020     Lab Results   Component Value Date    APTT 26.1 03/27/2019     Lab Results   Component Value Date    INR 1.0 03/27/2019       ASSESSMENT:    Patient Active Problem List   Diagnosis    Syncope    Essential hypertension    Hyperlipidemia    CKD (chronic kidney disease) stage 4, GFR 15-29 ml/min    Syncope and collapse    Systolic murmur    Left carotid bruit    Chronic otitis externa of both ears    Cholesteatoma, external ear    Syncope due to orthostatic hypotension    Nonrheumatic aortic valve stenosis    Heart murmur    Age-related physical debility    Chest pain           PLAN:    Severe AS  She would like time to think about TAVR and discuss this procedure with her family. We thoroughly discussed the process, rationale for performing this procedure and had a detailed discussion regarding the risks, benefits and alternatives. We will schedule 6 month follow-up with echocardiography, however the patient was encouraged to reach us sooner if she would like to continue her TAVR evaluation. Regarding her syncope, we recommend  stopping valsartan as hypotension is likely contributing to her syncopal episodes (home BP measurements usually in the normotensive range, including yesterday).    Porter Flanagan MD  PGY-VI    Staff:  I have personally taken the history and examined this patient and agree with the fellow's note as stated above and amended it accordingly :-)  This 89 yo lady is not sure if she wants to proceed with TAVR evaluation.  Her symptoms of easy fatigue and syncope are probably related.  We stopped her BP meds (again) and she will discuss with her daughters whether she wishes to proceed with TAVR.

## 2020-09-21 NOTE — PROGRESS NOTES
Cardiothoracic Surgery  Transcatheter Aortic Valve Replacement Evaluation      SUBJECTIVE:     History of Present Illness:  Patient is a 89 year old female with hypertension, hyperlipidemia, and known aortic stenosis who presents today with episodes of chest discomfort and syncope.  She denies dyspnea on exertion, lower extremity edema, and orthopnea.  She is here today with her family members for evaluation of transcatheter aortic valve replacement.    Past Medical History:   Diagnosis Date    High cholesterol     Hyperlipidemia     Hypertension     Syncope     Syncope and collapse      No past surgical history on file.  Family History   Problem Relation Age of Onset    No Known Problems Mother     No Known Problems Father      Social History     Tobacco Use    Smoking status: Never Smoker    Smokeless tobacco: Never Used   Substance Use Topics    Alcohol use: No    Drug use: No      Review of patient's allergies indicates:  No Known Allergies  Current medications reviewed    Review of Systems:  Constitutional: Negative for fever, chills, distress  Skin: no acute disorders.  Negative for rash, itching  HEENT: unremarkable.  Negative for sore throat, congestion  Cardiovascular: Positive for chest pain .  Negative for orthopnea lower extremity edema.  Respiratory: .  Negative for shortness of breath, cough  GI:  unremarkable.  Negative for abdominal pain, vomiting  :  Negative for hematuria, flank pain  Musculoskeletal: unremarkable.  Negative for falls, myalgias  Neuro:  Negative for dizziness, LOC  Psych:  Negative for substance abuse, memory loss      OBJECTIVE:     Vital Signs (Most Recent)     Vital signs reviewed    Physical Exam:  General Appearance: no acute distress.  Normal for age  Skin: no acute lesions, minor bruises from phlebotomy  HEENT: no masses/hematoma, Jugular veins: not distended  Resp:  excursion/effort normal; clear to auscultation  CV:  Rate:  regular  Rhythm: regular  Murmur:   systolic ejection murmur at right upper sternal border  GI: Bowel sounds: present; abdo soft, nondistended, nontender, no masses palpable  Extrem: Edema: minimal  Pulses: normal  Neuro: Alert and oriented; no focal deficit  Psych: no acute delirium noted  : voiding well  MSK: no acute findings, ranges of motion unchanged      I have personally reviewed the imaging, electrocardiogram, and pertinent lab findings    ASSESSMENT/PLAN:       89 y.o. female with multiple comorbidities presented with symptomatic severe aortic stenosis. Deemed inoperable for surgical aortic valve replacement (SAVR) due to age and subjective frailty.  Appropriate candidate for TAVR evaluation.  We explained the patient's condition, pathology and prognosis of severe aortic stenosis, treatment options including medical therapy, SAVR and TAVR, details of SAVR and the TAVR procedure, risks and benefits of SAVR and TAVR including myocardial infarction, stroke, pacemaker, bleeding, infection, acute renal injury, conversion to open surgery, need for emergency mechanical circulatory support, and potential complications and recovery course with patient and family, and also durability of surgical vs transcatheter valve and options for reintervention in the future.  Patient and family understood and agreed to proceed. All questions answered.    We spent >70 minutes reviewing, examining and evaluating this patient, including reviewing relevant diagnostic studies, and 50% of which were spent on patient counseling including the patient's condition, diagnosis, prognosis, expected recovery after SAVR vs TAVR, follow-up plan and next steps.      Gustavo Lam MD  Cardiothoracic Surgery  [facility]

## 2020-09-29 NOTE — TELEPHONE ENCOUNTER
Patient's daughter called and states that her mother has decided to proceed with the TAVR workup.  Will have CTA and sign consents for angiogram on 10/12/20 and will schedule cath 1-2 weeks later due to Cr 1.5.

## 2020-10-19 NOTE — ASSESSMENT & PLAN NOTE
Patient is 4/4 frail and requires assistance with most ADLs. She is considered inoperable from a SAVR standpoint.

## 2020-10-19 NOTE — PROGRESS NOTES
Subjective:    Patient ID:  Ignacio Oakes is a 89 y.o. female who presents for follow-up of Aortic Stenosis      Referring Physician: Dr. Granados    HPI  Ignacio Oakes is a 89 y.o. female who presents for TAVR evaluation. She was seen in valve clinic in September and reported that her main symptoms were easy fatigue and syncope likely related to hypotension. She has had multiple recurrent syncopal episodes in the past year. Valsartan 10-20 mg was stopped at her last visit and she has not had a recurrent episode since. Her home BP runs around 130/53. She denies CP and SOB on exertion. She is unable to accomplish most ADLs independently and lives with her two daughters. Denies LE swelling, weight gain, orthopnea, and palpitations.     Ignacio Oakes is a 89 y.o. female referred by Dr Granados for evaluation of severe AS (NYHA Class I sx).    The patient has undergone the following TAVR work-up:   · ECHO (Date 9/21/20): WILTON= 0.6 cm2, MG= 41mmHg, Peak Lucius= 4.1 m/s, EF= 60%.   [facility] (Date ): Needs angiogram   STS: 7%   Frailty: 4/4   Iliacs are >6.75 on R and > 8.09 on L   LVOT area by CTA is 3.81 cm2 (23.9 mm X 21.6 mm) and Avg Diameter is 22.0 per Dr Lund  Incidental findings on CT: none significant  CT Surgery risk assessment: inoperable, per Dr Lam due to age and frailty  Rhythm issues: none  PFTs: FEV1 % predicted, DLCO % predicted. Needs PFTs  Comorbidities: age, frailty    Ignacio Oakes is a 23 mm Gill S3 valve candidate (7% OS) via RTF access.    NYHA: I CCS: 0    Review of Systems   Constitution: Negative for chills and fever.   HENT: Negative for sore throat.    Eyes: Negative for blurred vision.   Cardiovascular: Negative for chest pain, claudication, cyanosis, dyspnea on exertion, irregular heartbeat, leg swelling, near-syncope, orthopnea, palpitations, paroxysmal nocturnal dyspnea and syncope.   Respiratory: Negative for cough and sputum production.    Hematologic/Lymphatic: Does not  bruise/bleed easily.   Skin: Negative for itching, rash and suspicious lesions.   Musculoskeletal: Negative for falls.   Gastrointestinal: Negative for abdominal pain and change in bowel habit.   Genitourinary: Negative for dysuria.   Neurological: Negative for disturbances in coordination, dizziness and loss of balance.   Psychiatric/Behavioral: Negative for altered mental status.          Past Medical History:   Diagnosis Date    High cholesterol     Hyperlipidemia     Hypertension     Syncope     Syncope and collapse        Current Outpatient Medications:     aspirin (ECOTRIN) 81 MG EC tablet, Take 81 mg by mouth once daily., Disp: , Rfl:     meclizine (ANTIVERT) 12.5 mg tablet, Take 12.5 mg by mouth 3 (three) times daily as needed for Dizziness., Disp: , Rfl:     multivit-mineral-iron-lutein Tab, Take by mouth., Disp: , Rfl:     rosuvastatin (CRESTOR) 5 MG tablet, Take 5 mg by mouth every other day. , Disp: , Rfl:   No current facility-administered medications for this visit.     Objective:    Physical Exam   Constitutional: She is oriented to person, place, and time. She appears well-developed. No distress.   4/4 frail   HENT:   Head: Normocephalic and atraumatic.   Eyes: EOM are normal.   Neck: Normal range of motion. No JVD present.   Cardiovascular: Normal rate, regular rhythm and intact distal pulses.   Murmur heard.   Harsh midsystolic murmur is present at the upper right sternal border radiating to the neck.  Pulmonary/Chest: Effort normal and breath sounds normal. No respiratory distress.   Abdominal: Soft. She exhibits no distension.   Musculoskeletal:         General: No edema.   Neurological: She is alert and oriented to person, place, and time.   Skin: Skin is warm and dry. She is not diaphoretic.   Vitals reviewed.          Vitals:    10/19/20 1449 10/19/20 1451   BP: (!) 194/98 (!) 190/100   BP Location: Right arm Left arm   Patient Position: Sitting Sitting   BP Method: Large (Manual)  "Large (Manual)   Pulse: 71 71   SpO2: 98%    Weight: 46.8 kg (103 lb 2.8 oz)    Height: 5' 3.39" (1.61 m)      Body mass index is 18.05 kg/m².    Test(s) Reviewed  I have reviewed the following in detail:  [] Stress test   [] Angiography   [] Echocardiogram   [x] Labs:   [] Other:       Assessment:   Nonrheumatic aortic valve stenosis  Patient is asymptomatic from an aortic valve standpoint given the absence of CP and SOB on exertion. Syncopal episodes are her only complaint which have resolved since stopping all BP medications.     Ignacio Oakes is a 89 y.o. female referred by Dr Granados for evaluation of severe AS (NYHA Class I sx).    The patient has undergone the following TAVR work-up:   · ECHO (Date 9/21/20): WILTON= 0.6 cm2, MG= 41mmHg, Peak Lucius= 4.1 m/s, EF= 60%.   [facility] (Date ): Needs angiogram   STS: 7%   Frailty: 4/4   Iliacs are >6.75 on R and > 8.09 on L   LVOT area by CTA is 3.81 cm2 (23.9 mm X 21.6 mm) and Avg Diameter is 22.0 per Dr Lund  Incidental findings on CT: none significant  CT Surgery risk assessment: inoperable, per Dr Lam due to age and frailty  Rhythm issues: none  PFTs: FEV1 % predicted, DLCO % predicted. Needs PFTs  Comorbidities: age, frailty    Ignacio Oakes is a 23 mm Gill S3 valve candidate (7% OS) via RTF access.    Syncope   Syncopal episodes due to hypotension. Unlikely unrelated to aortic stenosis. Denies any episodes since discontinuing Valsartan.     Essential hypertension  Off of all BP medications since office visit last month. BP is elevated in clinic, but runs around 130/56 at home. Suspected white coat hypertension.     CKD (chronic kidney disease) stage 4, GFR 15-29 ml/min  Stable. Cr 1.4.     Age-related physical debility  Patient is 4/4 frail and requires assistance with most ADLs. She is considered inoperable from a SAVR standpoint.    Plan:     1. Patient will call back if she wishes to continue with TAVR follow up. A long discussion was had with both her " and her family on the risk vs benefits of TAVR and what would be included in further workup.   2. Continue off of Valsartan. Recommend keeping her BP elevated to avoid syncopal episodes.   3. If she would like to continue follow up, she will need an angiogram if/when she becomes symptomatic.          Maria Antonia Rahman PA-C  Interventional Cardiology  [facility]      Staff:  I have personally taken the history and examined this patient and agree with the fellow's note as stated above and amended it accordingly :-)  She feels better off BP meds without recurrent syncope.  I recommend she not receive any further BP meds.  Her AS is severe but asymptomatic.  She has not decided whether she wants to pursue invasive therapy (BAV or TAVR) and needs cath and possible PCI prior to this.  I asked the family and her to decide what to do should be becomes symptomatic and let us know if she does want invasive treatment so we can see her back.  If she decides to be managed conservatively, then that can be done by Dr. Granados.

## 2020-10-19 NOTE — ASSESSMENT & PLAN NOTE
Patient is asymptomatic from an aortic valve standpoint given the absence of CP and SOB on exertion. Syncopal episodes are her only complaint which have resolved since stopping all BP medications.     Ignacio Oakes is a 89 y.o. female referred by Dr Granados for evaluation of severe AS (NYHA Class I sx).    The patient has undergone the following TAVR work-up:   · ECHO (Date 9/21/20): WILTON= 0.6 cm2, MG= 41mmHg, Peak Lucius= 4.1 m/s, EF= 60%.   [facility] (Date ): Needs angiogram   STS: 7%   Frailty: 4/4   Iliacs are >6.75 on R and > 8.09 on L   LVOT area by CTA is 3.81 cm2 (23.9 mm X 21.6 mm) and Avg Diameter is 22.0 per Dr Lund  Incidental findings on CT: none significant  CT Surgery risk assessment: inoperable, per Dr Lam due to age and frailty  Rhythm issues: none  PFTs: FEV1 % predicted, DLCO % predicted. Needs PFTs  Comorbidities: age, frailty    Ignacio Oakes is a 23 mm Gill S3 valve candidate (7% OS) via RTF access.

## 2020-10-19 NOTE — ASSESSMENT & PLAN NOTE
Off of all BP medications since office visit last month. BP is elevated in clinic, but runs around 130/56 at home. Suspected white coat hypertension.

## 2020-10-19 NOTE — ASSESSMENT & PLAN NOTE
Syncopal episodes due to hypotension. Unlikely unrelated to aortic stenosis. Denies any episodes since discontinuing Valsartan.

## 2021-01-01 ENCOUNTER — HOSPITAL ENCOUNTER (INPATIENT)
Facility: HOSPITAL | Age: 86
LOS: 11 days | DRG: 177 | End: 2021-01-15
Attending: EMERGENCY MEDICINE | Admitting: HOSPITALIST
Payer: MEDICARE

## 2021-01-01 VITALS
DIASTOLIC BLOOD PRESSURE: 54 MMHG | OXYGEN SATURATION: 100 % | SYSTOLIC BLOOD PRESSURE: 114 MMHG | WEIGHT: 100 LBS | TEMPERATURE: 97 F | BODY MASS INDEX: 17.72 KG/M2 | HEIGHT: 63 IN

## 2021-01-01 DIAGNOSIS — N17.9 AKI (ACUTE KIDNEY INJURY): ICD-10-CM

## 2021-01-01 DIAGNOSIS — U07.1 COVID-19 VIRUS INFECTION: Primary | ICD-10-CM

## 2021-01-01 DIAGNOSIS — G93.40 ACUTE ENCEPHALOPATHY: ICD-10-CM

## 2021-01-01 DIAGNOSIS — E87.5 HYPERKALEMIA: ICD-10-CM

## 2021-01-01 DIAGNOSIS — E86.0 DEHYDRATION: ICD-10-CM

## 2021-01-01 DIAGNOSIS — R53.1 WEAKNESS: ICD-10-CM

## 2021-01-01 DIAGNOSIS — R41.82 ALTERED MENTAL STATUS, UNSPECIFIED ALTERED MENTAL STATUS TYPE: ICD-10-CM

## 2021-01-01 LAB
25(OH)D3+25(OH)D2 SERPL-MCNC: 20 NG/ML (ref 30–96)
ALBUMIN SERPL BCP-MCNC: 2.4 G/DL (ref 3.5–5.2)
ALBUMIN SERPL BCP-MCNC: 2.6 G/DL (ref 3.5–5.2)
ALBUMIN SERPL BCP-MCNC: 2.7 G/DL (ref 3.5–5.2)
ALBUMIN SERPL BCP-MCNC: 2.7 G/DL (ref 3.5–5.2)
ALBUMIN SERPL BCP-MCNC: 3 G/DL (ref 3.5–5.2)
ALLENS TEST: ABNORMAL
ALP SERPL-CCNC: 51 U/L (ref 55–135)
ALP SERPL-CCNC: 52 U/L (ref 55–135)
ALP SERPL-CCNC: 54 U/L (ref 55–135)
ALP SERPL-CCNC: 55 U/L (ref 55–135)
ALP SERPL-CCNC: 60 U/L (ref 55–135)
ALT SERPL W/O P-5'-P-CCNC: 10 U/L (ref 10–44)
ALT SERPL W/O P-5'-P-CCNC: 13 U/L (ref 10–44)
ALT SERPL W/O P-5'-P-CCNC: 16 U/L (ref 10–44)
ALT SERPL W/O P-5'-P-CCNC: 17 U/L (ref 10–44)
ALT SERPL W/O P-5'-P-CCNC: 18 U/L (ref 10–44)
ANION GAP SERPL CALC-SCNC: 10 MMOL/L (ref 8–16)
ANION GAP SERPL CALC-SCNC: 12 MMOL/L (ref 8–16)
ANION GAP SERPL CALC-SCNC: 13 MMOL/L (ref 8–16)
ANION GAP SERPL CALC-SCNC: 13 MMOL/L (ref 8–16)
ANION GAP SERPL CALC-SCNC: 14 MMOL/L (ref 8–16)
ANION GAP SERPL CALC-SCNC: 16 MMOL/L (ref 8–16)
ANION GAP SERPL CALC-SCNC: 18 MMOL/L (ref 8–16)
ANION GAP SERPL CALC-SCNC: 18 MMOL/L (ref 8–16)
AST SERPL-CCNC: 28 U/L (ref 10–40)
AST SERPL-CCNC: 37 U/L (ref 10–40)
AST SERPL-CCNC: 41 U/L (ref 10–40)
AST SERPL-CCNC: 43 U/L (ref 10–40)
AST SERPL-CCNC: 48 U/L (ref 10–40)
BACTERIA BLD CULT: NORMAL
BACTERIA BLD CULT: NORMAL
BASOPHILS # BLD AUTO: 0.01 K/UL (ref 0–0.2)
BASOPHILS # BLD AUTO: 0.02 K/UL (ref 0–0.2)
BASOPHILS NFR BLD: 0.1 % (ref 0–1.9)
BASOPHILS NFR BLD: 0.2 % (ref 0–1.9)
BILIRUB SERPL-MCNC: 0.5 MG/DL (ref 0.1–1)
BILIRUB SERPL-MCNC: 0.6 MG/DL (ref 0.1–1)
BILIRUB SERPL-MCNC: 0.8 MG/DL (ref 0.1–1)
BILIRUB UR QL STRIP: NEGATIVE
BUN SERPL-MCNC: 37 MG/DL (ref 10–30)
BUN SERPL-MCNC: 40 MG/DL (ref 10–30)
BUN SERPL-MCNC: 40 MG/DL (ref 10–30)
BUN SERPL-MCNC: 41 MG/DL (ref 10–30)
BUN SERPL-MCNC: 42 MG/DL (ref 10–30)
BUN SERPL-MCNC: 43 MG/DL (ref 10–30)
BUN SERPL-MCNC: 43 MG/DL (ref 10–30)
BUN SERPL-MCNC: 47 MG/DL (ref 10–30)
BUN SERPL-MCNC: 49 MG/DL (ref 10–30)
BUN SERPL-MCNC: 73 MG/DL (ref 10–30)
BUN SERPL-MCNC: 94 MG/DL (ref 10–30)
CALCIUM SERPL-MCNC: 8 MG/DL (ref 8.7–10.5)
CALCIUM SERPL-MCNC: 8 MG/DL (ref 8.7–10.5)
CALCIUM SERPL-MCNC: 8.3 MG/DL (ref 8.7–10.5)
CALCIUM SERPL-MCNC: 8.4 MG/DL (ref 8.7–10.5)
CALCIUM SERPL-MCNC: 8.4 MG/DL (ref 8.7–10.5)
CALCIUM SERPL-MCNC: 8.5 MG/DL (ref 8.7–10.5)
CALCIUM SERPL-MCNC: 8.6 MG/DL (ref 8.7–10.5)
CALCIUM SERPL-MCNC: 8.8 MG/DL (ref 8.7–10.5)
CALCIUM SERPL-MCNC: 9.2 MG/DL (ref 8.7–10.5)
CHLORIDE SERPL-SCNC: 107 MMOL/L (ref 95–110)
CHLORIDE SERPL-SCNC: 108 MMOL/L (ref 95–110)
CHLORIDE SERPL-SCNC: 108 MMOL/L (ref 95–110)
CHLORIDE SERPL-SCNC: 112 MMOL/L (ref 95–110)
CHLORIDE SERPL-SCNC: 113 MMOL/L (ref 95–110)
CHLORIDE SERPL-SCNC: 114 MMOL/L (ref 95–110)
CHLORIDE SERPL-SCNC: 115 MMOL/L (ref 95–110)
CHLORIDE SERPL-SCNC: 115 MMOL/L (ref 95–110)
CHLORIDE SERPL-SCNC: 116 MMOL/L (ref 95–110)
CHLORIDE SERPL-SCNC: 117 MMOL/L (ref 95–110)
CHLORIDE SERPL-SCNC: 119 MMOL/L (ref 95–110)
CK SERPL-CCNC: 139 U/L (ref 20–180)
CLARITY UR: CLEAR
CO2 SERPL-SCNC: 13 MMOL/L (ref 23–29)
CO2 SERPL-SCNC: 15 MMOL/L (ref 23–29)
CO2 SERPL-SCNC: 18 MMOL/L (ref 23–29)
CO2 SERPL-SCNC: 19 MMOL/L (ref 23–29)
CO2 SERPL-SCNC: 20 MMOL/L (ref 23–29)
CO2 SERPL-SCNC: 20 MMOL/L (ref 23–29)
CO2 SERPL-SCNC: 21 MMOL/L (ref 23–29)
COLOR UR: YELLOW
CREAT SERPL-MCNC: 1.7 MG/DL (ref 0.5–1.4)
CREAT SERPL-MCNC: 1.8 MG/DL (ref 0.5–1.4)
CREAT SERPL-MCNC: 1.9 MG/DL (ref 0.5–1.4)
CREAT SERPL-MCNC: 2.2 MG/DL (ref 0.5–1.4)
CREAT SERPL-MCNC: 2.3 MG/DL (ref 0.5–1.4)
CREAT SERPL-MCNC: 3.2 MG/DL (ref 0.5–1.4)
CREAT SERPL-MCNC: 4.2 MG/DL (ref 0.5–1.4)
CRP SERPL-MCNC: 126.8 MG/L (ref 0–8.2)
CRP SERPL-MCNC: 135.1 MG/L (ref 0–8.2)
CRP SERPL-MCNC: 79.1 MG/L (ref 0–8.2)
CTP QC/QA: YES
D DIMER PPP IA.FEU-MCNC: 17.25 MG/L FEU
D DIMER PPP IA.FEU-MCNC: 4.09 MG/L FEU
D DIMER PPP IA.FEU-MCNC: 5.41 MG/L FEU
DIFFERENTIAL METHOD: ABNORMAL
EOSINOPHIL # BLD AUTO: 0 K/UL (ref 0–0.5)
EOSINOPHIL NFR BLD: 0 % (ref 0–8)
EOSINOPHIL NFR BLD: 0.1 % (ref 0–8)
ERYTHROCYTE [DISTWIDTH] IN BLOOD BY AUTOMATED COUNT: 14.1 % (ref 11.5–14.5)
ERYTHROCYTE [DISTWIDTH] IN BLOOD BY AUTOMATED COUNT: 14.1 % (ref 11.5–14.5)
ERYTHROCYTE [DISTWIDTH] IN BLOOD BY AUTOMATED COUNT: 14.2 % (ref 11.5–14.5)
ERYTHROCYTE [DISTWIDTH] IN BLOOD BY AUTOMATED COUNT: 14.3 % (ref 11.5–14.5)
EST. GFR  (AFRICAN AMERICAN): 13.6 ML/MIN/1.73 M^2
EST. GFR  (AFRICAN AMERICAN): 20.2 ML/MIN/1.73 M^2
EST. GFR  (AFRICAN AMERICAN): 21 ML/MIN/1.73 M^2
EST. GFR  (AFRICAN AMERICAN): 25.5 ML/MIN/1.73 M^2
EST. GFR  (AFRICAN AMERICAN): 27.2 ML/MIN/1.73 M^2
EST. GFR  (AFRICAN AMERICAN): 29.1 ML/MIN/1.73 M^2
EST. GFR  (AFRICAN AMERICAN): 9.8 ML/MIN/1.73 M^2
EST. GFR  (NON AFRICAN AMERICAN): 11.8 ML/MIN/1.73 M^2
EST. GFR  (NON AFRICAN AMERICAN): 17.5 ML/MIN/1.73 M^2
EST. GFR  (NON AFRICAN AMERICAN): 19 ML/MIN/1.73 M^2
EST. GFR  (NON AFRICAN AMERICAN): 22.1 ML/MIN/1.73 M^2
EST. GFR  (NON AFRICAN AMERICAN): 23.6 ML/MIN/1.73 M^2
EST. GFR  (NON AFRICAN AMERICAN): 25.3 ML/MIN/1.73 M^2
EST. GFR  (NON AFRICAN AMERICAN): 8.5 ML/MIN/1.73 M^2
FERRITIN SERPL-MCNC: 218 NG/ML (ref 20–300)
FERRITIN SERPL-MCNC: 350 NG/ML (ref 20–300)
FERRITIN SERPL-MCNC: 418 NG/ML (ref 20–300)
GLUCOSE SERPL-MCNC: 103 MG/DL (ref 70–110)
GLUCOSE SERPL-MCNC: 105 MG/DL (ref 70–110)
GLUCOSE SERPL-MCNC: 107 MG/DL (ref 70–110)
GLUCOSE SERPL-MCNC: 112 MG/DL (ref 70–110)
GLUCOSE SERPL-MCNC: 115 MG/DL (ref 70–110)
GLUCOSE SERPL-MCNC: 119 MG/DL (ref 70–110)
GLUCOSE SERPL-MCNC: 127 MG/DL (ref 70–110)
GLUCOSE SERPL-MCNC: 127 MG/DL (ref 70–110)
GLUCOSE SERPL-MCNC: 84 MG/DL (ref 70–110)
GLUCOSE SERPL-MCNC: 89 MG/DL (ref 70–110)
GLUCOSE SERPL-MCNC: 90 MG/DL (ref 70–110)
GLUCOSE UR QL STRIP: NEGATIVE
HCO3 UR-SCNC: 18.9 MMOL/L (ref 24–28)
HCT VFR BLD AUTO: 24.9 % (ref 37–48.5)
HCT VFR BLD AUTO: 30 % (ref 37–48.5)
HCT VFR BLD AUTO: 30.5 % (ref 37–48.5)
HCT VFR BLD AUTO: 31.4 % (ref 37–48.5)
HCT VFR BLD AUTO: 33.2 % (ref 37–48.5)
HCT VFR BLD AUTO: 33.6 % (ref 37–48.5)
HGB BLD-MCNC: 7.7 G/DL (ref 12–16)
HGB BLD-MCNC: 9.1 G/DL (ref 12–16)
HGB BLD-MCNC: 9.3 G/DL (ref 12–16)
HGB BLD-MCNC: 9.3 G/DL (ref 12–16)
HGB BLD-MCNC: 9.7 G/DL (ref 12–16)
HGB BLD-MCNC: 9.8 G/DL (ref 12–16)
HGB UR QL STRIP: NEGATIVE
IMM GRANULOCYTES # BLD AUTO: 0.05 K/UL (ref 0–0.04)
IMM GRANULOCYTES # BLD AUTO: 0.08 K/UL (ref 0–0.04)
IMM GRANULOCYTES # BLD AUTO: 0.11 K/UL (ref 0–0.04)
IMM GRANULOCYTES # BLD AUTO: 0.18 K/UL (ref 0–0.04)
IMM GRANULOCYTES # BLD AUTO: 0.2 K/UL (ref 0–0.04)
IMM GRANULOCYTES # BLD AUTO: 0.27 K/UL (ref 0–0.04)
IMM GRANULOCYTES NFR BLD AUTO: 0.6 % (ref 0–0.5)
IMM GRANULOCYTES NFR BLD AUTO: 0.8 % (ref 0–0.5)
IMM GRANULOCYTES NFR BLD AUTO: 1 % (ref 0–0.5)
IMM GRANULOCYTES NFR BLD AUTO: 1.3 % (ref 0–0.5)
IMM GRANULOCYTES NFR BLD AUTO: 1.4 % (ref 0–0.5)
IMM GRANULOCYTES NFR BLD AUTO: 1.9 % (ref 0–0.5)
KETONES UR QL STRIP: NEGATIVE
LACTATE SERPL-SCNC: 0.7 MMOL/L (ref 0.5–2.2)
LACTATE SERPL-SCNC: 0.9 MMOL/L (ref 0.5–2.2)
LDH SERPL L TO P-CCNC: 278 U/L (ref 110–260)
LDH SERPL L TO P-CCNC: 310 U/L (ref 110–260)
LDH SERPL L TO P-CCNC: 416 U/L (ref 110–260)
LEUKOCYTE ESTERASE UR QL STRIP: NEGATIVE
LYMPHOCYTES # BLD AUTO: 0.6 K/UL (ref 1–4.8)
LYMPHOCYTES # BLD AUTO: 0.7 K/UL (ref 1–4.8)
LYMPHOCYTES # BLD AUTO: 0.8 K/UL (ref 1–4.8)
LYMPHOCYTES # BLD AUTO: 0.8 K/UL (ref 1–4.8)
LYMPHOCYTES # BLD AUTO: 0.9 K/UL (ref 1–4.8)
LYMPHOCYTES # BLD AUTO: 1 K/UL (ref 1–4.8)
LYMPHOCYTES NFR BLD: 5.6 % (ref 18–48)
LYMPHOCYTES NFR BLD: 5.7 % (ref 18–48)
LYMPHOCYTES NFR BLD: 6.2 % (ref 18–48)
LYMPHOCYTES NFR BLD: 6.6 % (ref 18–48)
LYMPHOCYTES NFR BLD: 7.2 % (ref 18–48)
LYMPHOCYTES NFR BLD: 8 % (ref 18–48)
MAGNESIUM SERPL-MCNC: 2.7 MG/DL (ref 1.6–2.6)
MCH RBC QN AUTO: 27.4 PG (ref 27–31)
MCH RBC QN AUTO: 27.4 PG (ref 27–31)
MCH RBC QN AUTO: 27.5 PG (ref 27–31)
MCH RBC QN AUTO: 27.8 PG (ref 27–31)
MCH RBC QN AUTO: 28.2 PG (ref 27–31)
MCH RBC QN AUTO: 28.5 PG (ref 27–31)
MCHC RBC AUTO-ENTMCNC: 28.9 G/DL (ref 32–36)
MCHC RBC AUTO-ENTMCNC: 29.5 G/DL (ref 32–36)
MCHC RBC AUTO-ENTMCNC: 29.6 G/DL (ref 32–36)
MCHC RBC AUTO-ENTMCNC: 29.8 G/DL (ref 32–36)
MCHC RBC AUTO-ENTMCNC: 30.9 G/DL (ref 32–36)
MCHC RBC AUTO-ENTMCNC: 31 G/DL (ref 32–36)
MCV RBC AUTO: 90 FL (ref 82–98)
MCV RBC AUTO: 91 FL (ref 82–98)
MCV RBC AUTO: 92 FL (ref 82–98)
MCV RBC AUTO: 92 FL (ref 82–98)
MCV RBC AUTO: 95 FL (ref 82–98)
MCV RBC AUTO: 97 FL (ref 82–98)
MONOCYTES # BLD AUTO: 0.6 K/UL (ref 0.3–1)
MONOCYTES # BLD AUTO: 0.7 K/UL (ref 0.3–1)
MONOCYTES # BLD AUTO: 0.7 K/UL (ref 0.3–1)
MONOCYTES # BLD AUTO: 0.9 K/UL (ref 0.3–1)
MONOCYTES NFR BLD: 4.7 % (ref 4–15)
MONOCYTES NFR BLD: 4.9 % (ref 4–15)
MONOCYTES NFR BLD: 5.4 % (ref 4–15)
MONOCYTES NFR BLD: 5.6 % (ref 4–15)
MONOCYTES NFR BLD: 6.1 % (ref 4–15)
MONOCYTES NFR BLD: 7.3 % (ref 4–15)
NEUTROPHILS # BLD AUTO: 12.1 K/UL (ref 1.8–7.7)
NEUTROPHILS # BLD AUTO: 12.3 K/UL (ref 1.8–7.7)
NEUTROPHILS # BLD AUTO: 12.7 K/UL (ref 1.8–7.7)
NEUTROPHILS # BLD AUTO: 7.3 K/UL (ref 1.8–7.7)
NEUTROPHILS # BLD AUTO: 9.3 K/UL (ref 1.8–7.7)
NEUTROPHILS # BLD AUTO: 9.8 K/UL (ref 1.8–7.7)
NEUTROPHILS NFR BLD: 83.9 % (ref 38–73)
NEUTROPHILS NFR BLD: 85.3 % (ref 38–73)
NEUTROPHILS NFR BLD: 86 % (ref 38–73)
NEUTROPHILS NFR BLD: 87.5 % (ref 38–73)
NEUTROPHILS NFR BLD: 88 % (ref 38–73)
NEUTROPHILS NFR BLD: 88.2 % (ref 38–73)
NITRITE UR QL STRIP: NEGATIVE
NRBC BLD-RTO: 0 /100 WBC
PCO2 BLDA: 30.2 MMHG (ref 35–45)
PH SMN: 7.41 [PH] (ref 7.35–7.45)
PH UR STRIP: 5 [PH] (ref 5–8)
PHOSPHATE SERPL-MCNC: 4.7 MG/DL (ref 2.7–4.5)
PLATELET # BLD AUTO: 232 K/UL (ref 150–350)
PLATELET # BLD AUTO: 232 K/UL (ref 150–350)
PLATELET # BLD AUTO: 247 K/UL (ref 150–350)
PLATELET # BLD AUTO: 273 K/UL (ref 150–350)
PLATELET # BLD AUTO: 360 K/UL (ref 150–350)
PLATELET # BLD AUTO: 374 K/UL (ref 150–350)
PMV BLD AUTO: 10.9 FL (ref 9.2–12.9)
PMV BLD AUTO: 12.2 FL (ref 9.2–12.9)
PMV BLD AUTO: 12.5 FL (ref 9.2–12.9)
PMV BLD AUTO: 12.5 FL (ref 9.2–12.9)
PMV BLD AUTO: 12.6 FL (ref 9.2–12.9)
PMV BLD AUTO: 12.9 FL (ref 9.2–12.9)
PO2 BLDA: 102 MMHG (ref 80–100)
POC BE: -6 MMOL/L
POC SATURATED O2: 98 % (ref 95–100)
POC TCO2: 20 MMOL/L (ref 23–27)
POCT GLUCOSE: 114 MG/DL (ref 70–110)
POTASSIUM SERPL-SCNC: 4.3 MMOL/L (ref 3.5–5.1)
POTASSIUM SERPL-SCNC: 4.3 MMOL/L (ref 3.5–5.1)
POTASSIUM SERPL-SCNC: 4.5 MMOL/L (ref 3.5–5.1)
POTASSIUM SERPL-SCNC: 4.7 MMOL/L (ref 3.5–5.1)
POTASSIUM SERPL-SCNC: 4.8 MMOL/L (ref 3.5–5.1)
POTASSIUM SERPL-SCNC: 4.9 MMOL/L (ref 3.5–5.1)
POTASSIUM SERPL-SCNC: 5.1 MMOL/L (ref 3.5–5.1)
POTASSIUM SERPL-SCNC: 5.4 MMOL/L (ref 3.5–5.1)
POTASSIUM SERPL-SCNC: 6 MMOL/L (ref 3.5–5.1)
PROCALCITONIN SERPL IA-MCNC: 0.19 NG/ML
PROT SERPL-MCNC: 6.8 G/DL (ref 6–8.4)
PROT SERPL-MCNC: 7 G/DL (ref 6–8.4)
PROT SERPL-MCNC: 7.2 G/DL (ref 6–8.4)
PROT SERPL-MCNC: 7.3 G/DL (ref 6–8.4)
PROT SERPL-MCNC: 7.3 G/DL (ref 6–8.4)
PROT UR QL STRIP: ABNORMAL
RBC # BLD AUTO: 2.77 M/UL (ref 4–5.4)
RBC # BLD AUTO: 3.3 M/UL (ref 4–5.4)
RBC # BLD AUTO: 3.32 M/UL (ref 4–5.4)
RBC # BLD AUTO: 3.4 M/UL (ref 4–5.4)
RBC # BLD AUTO: 3.44 M/UL (ref 4–5.4)
RBC # BLD AUTO: 3.53 M/UL (ref 4–5.4)
SAMPLE: ABNORMAL
SARS-COV-2 RDRP RESP QL NAA+PROBE: POSITIVE
SITE: ABNORMAL
SODIUM SERPL-SCNC: 139 MMOL/L (ref 136–145)
SODIUM SERPL-SCNC: 140 MMOL/L (ref 136–145)
SODIUM SERPL-SCNC: 140 MMOL/L (ref 136–145)
SODIUM SERPL-SCNC: 143 MMOL/L (ref 136–145)
SODIUM SERPL-SCNC: 144 MMOL/L (ref 136–145)
SODIUM SERPL-SCNC: 147 MMOL/L (ref 136–145)
SODIUM SERPL-SCNC: 148 MMOL/L (ref 136–145)
SODIUM SERPL-SCNC: 154 MMOL/L (ref 136–145)
SP GR UR STRIP: 1.02 (ref 1–1.03)
TSH SERPL DL<=0.005 MIU/L-ACNC: 1.11 UIU/ML (ref 0.4–4)
URN SPEC COLLECT METH UR: ABNORMAL
UROBILINOGEN UR STRIP-ACNC: NEGATIVE EU/DL
WBC # BLD AUTO: 10.59 K/UL (ref 3.9–12.7)
WBC # BLD AUTO: 11.37 K/UL (ref 3.9–12.7)
WBC # BLD AUTO: 13.81 K/UL (ref 3.9–12.7)
WBC # BLD AUTO: 14.36 K/UL (ref 3.9–12.7)
WBC # BLD AUTO: 14.44 K/UL (ref 3.9–12.7)
WBC # BLD AUTO: 8.71 K/UL (ref 3.9–12.7)

## 2021-01-01 PROCEDURE — 36415 COLL VENOUS BLD VENIPUNCTURE: CPT

## 2021-01-01 PROCEDURE — 12000002 HC ACUTE/MED SURGE SEMI-PRIVATE ROOM

## 2021-01-01 PROCEDURE — 85025 COMPLETE CBC W/AUTO DIFF WBC: CPT

## 2021-01-01 PROCEDURE — 80048 BASIC METABOLIC PNL TOTAL CA: CPT | Mod: 91

## 2021-01-01 PROCEDURE — 25000003 PHARM REV CODE 250: Performed by: STUDENT IN AN ORGANIZED HEALTH CARE EDUCATION/TRAINING PROGRAM

## 2021-01-01 PROCEDURE — 93010 EKG 12-LEAD: ICD-10-PCS | Mod: ,,, | Performed by: INTERNAL MEDICINE

## 2021-01-01 PROCEDURE — 99233 SBSQ HOSP IP/OBS HIGH 50: CPT | Mod: ,,, | Performed by: NURSE PRACTITIONER

## 2021-01-01 PROCEDURE — P9612 CATHETERIZE FOR URINE SPEC: HCPCS

## 2021-01-01 PROCEDURE — 87040 BLOOD CULTURE FOR BACTERIA: CPT | Mod: 59

## 2021-01-01 PROCEDURE — 63600175 PHARM REV CODE 636 W HCPCS: Performed by: STUDENT IN AN ORGANIZED HEALTH CARE EDUCATION/TRAINING PROGRAM

## 2021-01-01 PROCEDURE — 99900035 HC TECH TIME PER 15 MIN (STAT)

## 2021-01-01 PROCEDURE — 80053 COMPREHEN METABOLIC PANEL: CPT

## 2021-01-01 PROCEDURE — 97535 SELF CARE MNGMENT TRAINING: CPT

## 2021-01-01 PROCEDURE — 82306 VITAMIN D 25 HYDROXY: CPT

## 2021-01-01 PROCEDURE — 94761 N-INVAS EAR/PLS OXIMETRY MLT: CPT

## 2021-01-01 PROCEDURE — 63600175 PHARM REV CODE 636 W HCPCS: Performed by: HOSPITALIST

## 2021-01-01 PROCEDURE — 92526 ORAL FUNCTION THERAPY: CPT

## 2021-01-01 PROCEDURE — 99232 PR SUBSEQUENT HOSPITAL CARE,LEVL II: ICD-10-PCS | Mod: ,,, | Performed by: STUDENT IN AN ORGANIZED HEALTH CARE EDUCATION/TRAINING PROGRAM

## 2021-01-01 PROCEDURE — 99233 PR SUBSEQUENT HOSPITAL CARE,LEVL III: ICD-10-PCS | Mod: ,,, | Performed by: NURSE PRACTITIONER

## 2021-01-01 PROCEDURE — 99223 1ST HOSP IP/OBS HIGH 75: CPT | Mod: ,,, | Performed by: HOSPITALIST

## 2021-01-01 PROCEDURE — 81003 URINALYSIS AUTO W/O SCOPE: CPT

## 2021-01-01 PROCEDURE — 27000221 HC OXYGEN, UP TO 24 HOURS

## 2021-01-01 PROCEDURE — 25000003 PHARM REV CODE 250: Performed by: HOSPITALIST

## 2021-01-01 PROCEDURE — 99285 EMERGENCY DEPT VISIT HI MDM: CPT | Mod: 25

## 2021-01-01 PROCEDURE — 99497 PR ADVNCD CARE PLAN 30 MIN: ICD-10-PCS | Mod: ,,, | Performed by: NURSE PRACTITIONER

## 2021-01-01 PROCEDURE — 83735 ASSAY OF MAGNESIUM: CPT

## 2021-01-01 PROCEDURE — 86140 C-REACTIVE PROTEIN: CPT

## 2021-01-01 PROCEDURE — 96361 HYDRATE IV INFUSION ADD-ON: CPT

## 2021-01-01 PROCEDURE — 84443 ASSAY THYROID STIM HORMONE: CPT

## 2021-01-01 PROCEDURE — 20600001 HC STEP DOWN PRIVATE ROOM

## 2021-01-01 PROCEDURE — 93005 ELECTROCARDIOGRAM TRACING: CPT

## 2021-01-01 PROCEDURE — 63600175 PHARM REV CODE 636 W HCPCS

## 2021-01-01 PROCEDURE — 82728 ASSAY OF FERRITIN: CPT

## 2021-01-01 PROCEDURE — 95714 VEEG EA 12-26 HR UNMNTR: CPT

## 2021-01-01 PROCEDURE — 99233 SBSQ HOSP IP/OBS HIGH 50: CPT | Mod: ,,, | Performed by: STUDENT IN AN ORGANIZED HEALTH CARE EDUCATION/TRAINING PROGRAM

## 2021-01-01 PROCEDURE — 84100 ASSAY OF PHOSPHORUS: CPT

## 2021-01-01 PROCEDURE — 84145 PROCALCITONIN (PCT): CPT

## 2021-01-01 PROCEDURE — 83615 LACTATE (LD) (LDH) ENZYME: CPT

## 2021-01-01 PROCEDURE — 99233 PR SUBSEQUENT HOSPITAL CARE,LEVL III: ICD-10-PCS | Mod: ,,, | Performed by: STUDENT IN AN ORGANIZED HEALTH CARE EDUCATION/TRAINING PROGRAM

## 2021-01-01 PROCEDURE — 95720 EEG PHY/QHP EA INCR W/VEEG: CPT | Mod: ,,, | Performed by: PSYCHIATRY & NEUROLOGY

## 2021-01-01 PROCEDURE — 99232 SBSQ HOSP IP/OBS MODERATE 35: CPT | Mod: ,,, | Performed by: STUDENT IN AN ORGANIZED HEALTH CARE EDUCATION/TRAINING PROGRAM

## 2021-01-01 PROCEDURE — 99233 SBSQ HOSP IP/OBS HIGH 50: CPT | Mod: ,,, | Performed by: HOSPITALIST

## 2021-01-01 PROCEDURE — 99232 PR SUBSEQUENT HOSPITAL CARE,LEVL II: ICD-10-PCS | Mod: ,,, | Performed by: INTERNAL MEDICINE

## 2021-01-01 PROCEDURE — 85379 FIBRIN DEGRADATION QUANT: CPT

## 2021-01-01 PROCEDURE — 99223 PR INITIAL HOSPITAL CARE,LEVL III: ICD-10-PCS | Mod: ,,, | Performed by: HOSPITALIST

## 2021-01-01 PROCEDURE — 95700 EEG CONT REC W/VID EEG TECH: CPT

## 2021-01-01 PROCEDURE — 25000003 PHARM REV CODE 250: Performed by: EMERGENCY MEDICINE

## 2021-01-01 PROCEDURE — 83605 ASSAY OF LACTIC ACID: CPT

## 2021-01-01 PROCEDURE — 93010 ELECTROCARDIOGRAM REPORT: CPT | Mod: ,,, | Performed by: INTERNAL MEDICINE

## 2021-01-01 PROCEDURE — 99233 PR SUBSEQUENT HOSPITAL CARE,LEVL III: ICD-10-PCS | Mod: ,,, | Performed by: HOSPITALIST

## 2021-01-01 PROCEDURE — 82550 ASSAY OF CK (CPK): CPT

## 2021-01-01 PROCEDURE — 99497 ADVNCD CARE PLAN 30 MIN: CPT | Mod: ,,, | Performed by: STUDENT IN AN ORGANIZED HEALTH CARE EDUCATION/TRAINING PROGRAM

## 2021-01-01 PROCEDURE — U0002 COVID-19 LAB TEST NON-CDC: HCPCS | Performed by: EMERGENCY MEDICINE

## 2021-01-01 PROCEDURE — 96360 HYDRATION IV INFUSION INIT: CPT

## 2021-01-01 PROCEDURE — 95720 PR EEG, W/VIDEO, CONT RECORD, I&R, >12<26 HRS: ICD-10-PCS | Mod: ,,, | Performed by: PSYCHIATRY & NEUROLOGY

## 2021-01-01 PROCEDURE — 92610 EVALUATE SWALLOWING FUNCTION: CPT

## 2021-01-01 PROCEDURE — 99497 PR ADVNCD CARE PLAN 30 MIN: ICD-10-PCS | Mod: ,,, | Performed by: STUDENT IN AN ORGANIZED HEALTH CARE EDUCATION/TRAINING PROGRAM

## 2021-01-01 PROCEDURE — 99232 SBSQ HOSP IP/OBS MODERATE 35: CPT | Mod: ,,, | Performed by: INTERNAL MEDICINE

## 2021-01-01 PROCEDURE — 99497 ADVNCD CARE PLAN 30 MIN: CPT | Mod: ,,, | Performed by: NURSE PRACTITIONER

## 2021-01-01 RX ORDER — IBUPROFEN 200 MG
24 TABLET ORAL
Status: DISCONTINUED | OUTPATIENT
Start: 2021-01-01 | End: 2021-01-01 | Stop reason: HOSPADM

## 2021-01-01 RX ORDER — HYDRALAZINE HYDROCHLORIDE 20 MG/ML
10 INJECTION INTRAMUSCULAR; INTRAVENOUS EVERY 6 HOURS PRN
Status: DISCONTINUED | OUTPATIENT
Start: 2021-01-01 | End: 2021-01-01 | Stop reason: HOSPADM

## 2021-01-01 RX ORDER — TALC
6 POWDER (GRAM) TOPICAL NIGHTLY PRN
Status: DISCONTINUED | OUTPATIENT
Start: 2021-01-01 | End: 2021-01-01 | Stop reason: HOSPADM

## 2021-01-01 RX ORDER — DEXTROSE MONOHYDRATE 50 MG/ML
INJECTION, SOLUTION INTRAVENOUS CONTINUOUS
Status: DISCONTINUED | OUTPATIENT
Start: 2021-01-01 | End: 2021-01-01

## 2021-01-01 RX ORDER — FUROSEMIDE 10 MG/ML
20 INJECTION INTRAMUSCULAR; INTRAVENOUS ONCE
Status: COMPLETED | OUTPATIENT
Start: 2021-01-01 | End: 2021-01-01

## 2021-01-01 RX ORDER — IBUPROFEN 200 MG
16 TABLET ORAL
Status: DISCONTINUED | OUTPATIENT
Start: 2021-01-01 | End: 2021-01-01 | Stop reason: HOSPADM

## 2021-01-01 RX ORDER — SCOLOPAMINE TRANSDERMAL SYSTEM 1 MG/1
1 PATCH, EXTENDED RELEASE TRANSDERMAL
Status: DISCONTINUED | OUTPATIENT
Start: 2021-01-01 | End: 2021-01-01 | Stop reason: HOSPADM

## 2021-01-01 RX ORDER — HEPARIN SODIUM 5000 [USP'U]/ML
5000 INJECTION, SOLUTION INTRAVENOUS; SUBCUTANEOUS EVERY 8 HOURS
Status: DISCONTINUED | OUTPATIENT
Start: 2021-01-01 | End: 2021-01-01

## 2021-01-01 RX ORDER — ACETAMINOPHEN 325 MG/1
650 TABLET ORAL EVERY 4 HOURS PRN
Status: DISCONTINUED | OUTPATIENT
Start: 2021-01-01 | End: 2021-01-01 | Stop reason: HOSPADM

## 2021-01-01 RX ORDER — SODIUM CHLORIDE 0.9 % (FLUSH) 0.9 %
10 SYRINGE (ML) INJECTION
Status: DISCONTINUED | OUTPATIENT
Start: 2021-01-01 | End: 2021-01-01 | Stop reason: HOSPADM

## 2021-01-01 RX ORDER — SODIUM CHLORIDE, SODIUM LACTATE, POTASSIUM CHLORIDE, CALCIUM CHLORIDE 600; 310; 30; 20 MG/100ML; MG/100ML; MG/100ML; MG/100ML
INJECTION, SOLUTION INTRAVENOUS CONTINUOUS
Status: ACTIVE | OUTPATIENT
Start: 2021-01-01 | End: 2021-01-01

## 2021-01-01 RX ORDER — MUPIROCIN 20 MG/G
OINTMENT TOPICAL 2 TIMES DAILY
Status: DISPENSED | OUTPATIENT
Start: 2021-01-01 | End: 2021-01-01

## 2021-01-01 RX ORDER — ONDANSETRON 2 MG/ML
4 INJECTION INTRAMUSCULAR; INTRAVENOUS EVERY 8 HOURS PRN
Status: DISCONTINUED | OUTPATIENT
Start: 2021-01-01 | End: 2021-01-01 | Stop reason: HOSPADM

## 2021-01-01 RX ORDER — ROSUVASTATIN CALCIUM 5 MG/1
5 TABLET, COATED ORAL EVERY OTHER DAY
Status: DISCONTINUED | OUTPATIENT
Start: 2021-01-01 | End: 2021-01-01

## 2021-01-01 RX ORDER — LABETALOL HCL 20 MG/4 ML
10 SYRINGE (ML) INTRAVENOUS EVERY 4 HOURS PRN
Status: DISCONTINUED | OUTPATIENT
Start: 2021-01-01 | End: 2021-01-01 | Stop reason: HOSPADM

## 2021-01-01 RX ORDER — FUROSEMIDE 10 MG/ML
INJECTION INTRAMUSCULAR; INTRAVENOUS
Status: COMPLETED
Start: 2021-01-01 | End: 2021-01-01

## 2021-01-01 RX ORDER — MORPHINE SULFATE 2 MG/ML
2 INJECTION, SOLUTION INTRAMUSCULAR; INTRAVENOUS EVERY 4 HOURS PRN
Status: DISCONTINUED | OUTPATIENT
Start: 2021-01-01 | End: 2021-01-01 | Stop reason: HOSPADM

## 2021-01-01 RX ORDER — ASPIRIN 81 MG/1
81 TABLET ORAL DAILY
Status: DISCONTINUED | OUTPATIENT
Start: 2021-01-01 | End: 2021-01-01

## 2021-01-01 RX ORDER — MORPHINE SULFATE/0.9% NACL/PF 1 MG/ML
1 PLASTIC BAG, INJECTION (ML) INTRAVENOUS CONTINUOUS
Status: DISCONTINUED | OUTPATIENT
Start: 2021-01-01 | End: 2021-01-01 | Stop reason: HOSPADM

## 2021-01-01 RX ADMIN — Medication 0.5 MG/HR: at 04:01

## 2021-01-01 RX ADMIN — FUROSEMIDE 20 MG: 10 INJECTION, SOLUTION INTRAMUSCULAR; INTRAVENOUS at 07:01

## 2021-01-01 RX ADMIN — MUPIROCIN: 20 OINTMENT TOPICAL at 10:01

## 2021-01-01 RX ADMIN — HEPARIN SODIUM 5000 UNITS: 5000 INJECTION INTRAVENOUS; SUBCUTANEOUS at 06:01

## 2021-01-01 RX ADMIN — SCOPALAMINE 1 PATCH: 1 PATCH, EXTENDED RELEASE TRANSDERMAL at 04:01

## 2021-01-01 RX ADMIN — HEPARIN SODIUM 5000 UNITS: 5000 INJECTION INTRAVENOUS; SUBCUTANEOUS at 05:01

## 2021-01-01 RX ADMIN — Medication 10 MG: at 12:01

## 2021-01-01 RX ADMIN — HEPARIN SODIUM 5000 UNITS: 5000 INJECTION INTRAVENOUS; SUBCUTANEOUS at 09:01

## 2021-01-01 RX ADMIN — DEXTROSE 100 ML/HR: 5 SOLUTION INTRAVENOUS at 10:01

## 2021-01-01 RX ADMIN — CALCIUM GLUCONATE 2000 MG: 98 INJECTION, SOLUTION INTRAVENOUS at 05:01

## 2021-01-01 RX ADMIN — HEPARIN SODIUM 5000 UNITS: 5000 INJECTION INTRAVENOUS; SUBCUTANEOUS at 02:01

## 2021-01-01 RX ADMIN — MORPHINE SULFATE 2 MG: 2 INJECTION, SOLUTION INTRAMUSCULAR; INTRAVENOUS at 06:01

## 2021-01-01 RX ADMIN — MORPHINE SULFATE 2 MG: 2 INJECTION, SOLUTION INTRAMUSCULAR; INTRAVENOUS at 08:01

## 2021-01-01 RX ADMIN — Medication 10 MG: at 09:01

## 2021-01-01 RX ADMIN — MUPIROCIN: 20 OINTMENT TOPICAL at 09:01

## 2021-01-01 RX ADMIN — Medication 10 MG: at 07:01

## 2021-01-01 RX ADMIN — MORPHINE SULFATE 2 MG: 2 INJECTION, SOLUTION INTRAMUSCULAR; INTRAVENOUS at 01:01

## 2021-01-01 RX ADMIN — DEXTROSE 70 ML/HR: 5 SOLUTION INTRAVENOUS at 02:01

## 2021-01-01 RX ADMIN — SODIUM CHLORIDE, SODIUM LACTATE, POTASSIUM CHLORIDE, AND CALCIUM CHLORIDE: .6; .31; .03; .02 INJECTION, SOLUTION INTRAVENOUS at 01:01

## 2021-01-01 RX ADMIN — MUPIROCIN: 20 OINTMENT TOPICAL at 08:01

## 2021-01-01 RX ADMIN — MORPHINE SULFATE 2 MG: 2 INJECTION, SOLUTION INTRAMUSCULAR; INTRAVENOUS at 12:01

## 2021-01-01 RX ADMIN — MORPHINE SULFATE 2 MG: 2 INJECTION, SOLUTION INTRAMUSCULAR; INTRAVENOUS at 04:01

## 2021-01-01 RX ADMIN — HEPARIN SODIUM 5000 UNITS: 5000 INJECTION INTRAVENOUS; SUBCUTANEOUS at 10:01

## 2021-01-01 RX ADMIN — SODIUM CHLORIDE 1000 ML: 0.9 INJECTION, SOLUTION INTRAVENOUS at 12:01

## 2021-01-01 RX ADMIN — MUPIROCIN 1 G: 20 OINTMENT TOPICAL at 08:01

## 2021-01-01 RX ADMIN — SCOPALAMINE 1 PATCH: 1 PATCH, EXTENDED RELEASE TRANSDERMAL at 05:01

## 2021-01-01 RX ADMIN — HEPARIN SODIUM 5000 UNITS: 5000 INJECTION INTRAVENOUS; SUBCUTANEOUS at 03:01

## 2021-01-01 RX ADMIN — SODIUM CHLORIDE 500 ML: 0.9 INJECTION, SOLUTION INTRAVENOUS at 10:01

## 2021-01-04 PROBLEM — G93.40 ACUTE ENCEPHALOPATHY: Status: ACTIVE | Noted: 2021-01-01

## 2021-01-04 PROBLEM — U07.1 COVID-19 VIRUS INFECTION: Status: ACTIVE | Noted: 2021-01-01

## 2021-01-05 PROBLEM — E87.5 HYPERKALEMIA: Status: ACTIVE | Noted: 2021-01-01

## 2021-01-09 PROBLEM — Z51.5 COMFORT MEASURES ONLY STATUS: Status: ACTIVE | Noted: 2021-01-01

## 2021-01-09 PROBLEM — Z51.5 ENCOUNTER FOR PALLIATIVE CARE: Status: ACTIVE | Noted: 2021-01-01

## 2022-11-07 NOTE — ASSESSMENT & PLAN NOTE
- Hypertensive on arrival to ED; states no blood pressure medication prior to ED arrival  - Will continue home medications: Toprol XL 50 mg QD, benazapril 20 mg QD, amlodipine 10 mg QD   - Monitor        There are no preventive care reminders to display for this patient.    Patient is up to date, no discussion needed.